# Patient Record
Sex: MALE | Race: BLACK OR AFRICAN AMERICAN | NOT HISPANIC OR LATINO | Employment: UNEMPLOYED | URBAN - METROPOLITAN AREA
[De-identification: names, ages, dates, MRNs, and addresses within clinical notes are randomized per-mention and may not be internally consistent; named-entity substitution may affect disease eponyms.]

---

## 2017-06-07 DIAGNOSIS — G89.29 CHRONIC RADICULAR LOW BACK PAIN: ICD-10-CM

## 2017-06-07 DIAGNOSIS — K21.9 GASTROESOPHAGEAL REFLUX DISEASE WITHOUT ESOPHAGITIS: Chronic | ICD-10-CM

## 2017-06-07 DIAGNOSIS — E78.5 HYPERLIPIDEMIA LDL GOAL <100: Chronic | ICD-10-CM

## 2017-06-07 DIAGNOSIS — M54.16 CHRONIC RADICULAR LOW BACK PAIN: ICD-10-CM

## 2017-06-07 DIAGNOSIS — K59.01 SLOW TRANSIT CONSTIPATION: ICD-10-CM

## 2017-06-07 DIAGNOSIS — I10 HYPERTENSION GOAL BP (BLOOD PRESSURE) < 140/90: Chronic | ICD-10-CM

## 2017-06-07 NOTE — TELEPHONE ENCOUNTER
lisinoprol 20 mg      Last Written Prescription Date: 6/28/16  Last Fill Quantity: 30, # refills: 11  [place] Visit [place] [facility] or [facility] prescribing provider: 9/22/16       Lab Results   Component Value Date    CHOL 237 09/22/2016     Lab Results   Component Value Date    HDL 39 09/22/2016     Lab Results   Component Value Date     09/22/2016     Lab Results   Component Value Date    TRIG 264 09/22/2016     Lab Results   Component Value Date    CHOLHDLRATIO 6.5 10/06/2014     mapap 650 mg       Last Written Prescription Date:  6/28/16  Last Fill Quantity:90  ,  # refills:   11  Last Office Visit with [facility] or [facility] prescribing provider: 9/22/16                                             Lisinopril 20 mg       Last Written Prescription Date:    Last Fill Quantity:  , # refills:    Last Office Visit with [facility] or [facility] prescribing provider: 9/22/16       Potassium   Date Value Ref Range Status   09/22/2016 4.8 3.4 - 5.3 mmol/L Final     Creatinine   Date Value Ref Range Status   09/22/2016 0.85 0.66 - 1.25 mg/dL Final     BP Readings from Last 3 Encounters:   09/22/16 126/66   06/28/16 132/70   02/15/16 126/64

## 2017-06-07 NOTE — TELEPHONE ENCOUNTER
All of these medications are not on the medication list at all    Metoprolol er 25 mg       Last Written Prescription Date:    Last Fill Quantity:  , # refills:       Last Office Visit with [facility], [facility] or  [facility] prescribing provider:  9/22/16   Future Office Visit:        BP Readings from Last 3 Encounters:   09/22/16 126/66   06/28/16 132/70   02/15/16 126/64     Pantoprazole dr 40 mg       Last Written Prescription Date:    Last Fill Quantity:  ,  # refills:     Last Office Visit with [facility], [facility] or  [facility] prescribing provider: 9/22/16                                             Docusate senna       Last Written Prescription Date:    Last Fill Quantity:  ,  # refills:     Last Office Visit with [facility], [facility] or [facility] prescribing provider: 9/22/16                                             Clopidogrel 75 mg            Last Written Prescription Date:    Last Fill Quantity:  , # refills:      Last Office Visit with [facility], [facility] or [facility] prescribing provider:  9/22/16   Future Office Visit:       Lab Results   Component Value Date    WBC 6.9 02/15/2016     Lab Results   Component Value Date    RBC 5.79 02/15/2016     Lab Results   Component Value Date    HGB 17.2 02/15/2016     Lab Results   Component Value Date    HCT 48.8 02/15/2016     No components found for: MCT  Lab Results   Component Value Date    MCV 84 02/15/2016     Lab Results   Component Value Date    MCH 29.7 02/15/2016     Lab Results   Component Value Date    MCHC 35.2 02/15/2016     Lab Results   Component Value Date    RDW 11.9 02/15/2016     Lab Results   Component Value Date     02/15/2016     Lab Results   Component Value Date    AST 36 06/29/2014     Lab Results   Component Value Date    ALT 44 09/22/2016     Creatinine   Date Value Ref Range Status   09/22/2016 0.85 0.66 - 1.25 mg/dL Final   ]

## 2017-06-09 RX ORDER — LISINOPRIL 20 MG/1
20 TABLET ORAL DAILY
Qty: 90 TABLET | Refills: 0 | Status: SHIPPED | OUTPATIENT
Start: 2017-06-09 | End: 2017-10-24

## 2017-06-09 RX ORDER — AMOXICILLIN 250 MG
1 CAPSULE ORAL 2 TIMES DAILY
Qty: 60 TABLET | Refills: 11 | Status: SHIPPED | OUTPATIENT
Start: 2017-06-09 | End: 2017-11-17

## 2017-06-09 RX ORDER — ATORVASTATIN CALCIUM 20 MG/1
20 TABLET, FILM COATED ORAL DAILY
Qty: 90 TABLET | Refills: 0 | Status: SHIPPED | OUTPATIENT
Start: 2017-06-09 | End: 2017-10-24

## 2017-06-09 RX ORDER — CLOPIDOGREL BISULFATE 75 MG/1
75 TABLET ORAL DAILY
Qty: 39 TABLET | Refills: 0 | Status: SHIPPED | OUTPATIENT
Start: 2017-06-09 | End: 2017-10-24

## 2017-06-09 RX ORDER — METOPROLOL SUCCINATE 25 MG/1
25 TABLET, EXTENDED RELEASE ORAL DAILY
Qty: 30 TABLET | Refills: 0 | Status: SHIPPED | OUTPATIENT
Start: 2017-06-09 | End: 2017-10-24

## 2017-06-09 RX ORDER — PANTOPRAZOLE SODIUM 40 MG/1
40 TABLET, DELAYED RELEASE ORAL DAILY
Qty: 30 TABLET | Refills: 0 | Status: SHIPPED | OUTPATIENT
Start: 2017-06-09 | End: 2017-09-02

## 2017-06-09 RX ORDER — SENNOSIDES 8.6 MG
650 CAPSULE ORAL 3 TIMES DAILY
Qty: 270 TABLET | Refills: 0 | Status: SHIPPED | OUTPATIENT
Start: 2017-06-09 | End: 2017-11-17

## 2017-06-09 NOTE — TELEPHONE ENCOUNTER
Routing refill request to provider for review/approval because:  Drug not active on patient's medication list

## 2017-06-09 NOTE — TELEPHONE ENCOUNTER
Lisinopril, APAP, atorvastatin   Prescription approved per [facility] Refill Protocol for 12 months of refills since last appointment, which was 9/22/16    atorvastatin (LIPITOR) 20 MG tablet    Last Written Prescription Date: 6/28/16  Last Fill Quantity: 30, # refills: 11  Last Office Visit with [facility], [facility] or [facility] prescribing provider: 9/22/16       Lab Results   Component Value Date    CHOL 237 09/22/2016     Lab Results   Component Value Date    HDL 39 09/22/2016     Lab Results   Component Value Date     09/22/2016     Lab Results   Component Value Date    TRIG 264 09/22/2016     Lab Results   Component Value Date    CHOLHDLRATIO 6.5 10/06/2014

## 2017-09-02 DIAGNOSIS — K21.9 GASTROESOPHAGEAL REFLUX DISEASE WITHOUT ESOPHAGITIS: Chronic | ICD-10-CM

## 2017-09-02 NOTE — TELEPHONE ENCOUNTER
PANTOPRAZOLE SOD DR 40 MG TAB      Last Written Prescription Date: 07/07/17  Last Fill Quantity: 30,  # refills: 1   Last Office Visit with [facility], [facility] or [facility] prescribing provider: 08/04/17

## 2017-09-06 RX ORDER — PANTOPRAZOLE SODIUM 40 MG/1
TABLET, DELAYED RELEASE ORAL
Qty: 30 TABLET | Refills: 10 | Status: SHIPPED | OUTPATIENT
Start: 2017-09-06 | End: 2017-10-24

## 2017-09-26 RX ORDER — SITAGLIPTIN 100 MG/1
TABLET, FILM COATED ORAL
Qty: 90 TABLET | Refills: 0 | Status: SHIPPED | OUTPATIENT
Start: 2017-09-26 | End: 2017-11-17

## 2017-09-26 NOTE — TELEPHONE ENCOUNTER
Januvia 100 mg         Last Written Prescription Date: 9/22/16  Last Fill Quantity: 90, # refills: 1  Last Office Visit with [facility] or [facility] prescribing provider:  9/22/16        BP Readings from Last 3 Encounters:   09/22/16 126/66   06/28/16 132/70   02/15/16 126/64     Lab Results   Component Value Date    MICROL 6 09/22/2016     Lab Results   Component Value Date    UMALCR 5.47 09/22/2016     Creatinine   Date Value Ref Range Status   09/22/2016 0.85 0.66 - 1.25 mg/dL Final   ]  GFR Estimate   Date Value Ref Range Status   09/22/2016 >90 >60 mL/min/1.7m2 Final   02/15/2016 >90 >60 mL/min/1.7m2 Final   06/29/2014 >90 >60 mL/min/1.7m2 Final     GFR Estimate If Black   Date Value Ref Range Status   09/22/2016 >90 >60 mL/min/1.7m2 Final   02/15/2016 >90 >60 mL/min/1.7m2 Final   06/29/2014 >90 >60 mL/min/1.7m2 Final     Lab Results   Component Value Date    CHOL 237 09/22/2016     Lab Results   Component Value Date    HDL 39 09/22/2016     Lab Results   Component Value Date     09/22/2016     Lab Results   Component Value Date    TRIG 264 09/22/2016     Lab Results   Component Value Date    CHOLHDLRATIO 6.5 10/06/2014     Lab Results   Component Value Date    AST 36 06/29/2014     Lab Results   Component Value Date    ALT 44 09/22/2016     Lab Results   Component Value Date    A1C 9.0 09/22/2016    A1C 8.9 10/06/2014    A1C 10.1 09/30/2013    A1C 9.6 07/31/2013    A1C 11.3 04/08/2013     Potassium   Date Value Ref Range Status   09/22/2016 4.8 3.4 - 5.3 mmol/L Final

## 2017-09-26 NOTE — TELEPHONE ENCOUNTER
Medication is being filled for 1 time refill only due to:  Patient needs to be seen because it has been more than one year since last visit. Needs labs also.

## 2017-10-09 ENCOUNTER — TELEPHONE (OUTPATIENT)
Dept: FAMILY MEDICINE | Facility: CLINIC | Age: 47
End: 2017-10-09

## 2017-10-09 NOTE — TELEPHONE ENCOUNTER
Panel Management Review      Patient has the following on his problem list:     Depression / Dysthymia review    Measure:  Needs PHQ-9 score of 4 or less during index window.  Administer PHQ-9 and if score is 5 or more, send encounter to provider for next steps.    5 - 7 month window range:     PHQ-9 SCORE 9/16/2013 10/6/2014 6/28/2016   Total Score 13 0 -   Total Score - - 0       If PHQ-9 recheck is 5 or more, route to provider for next steps.    Patient is due for:  PHQ9 and DAP    Diabetes    ASA: Passed    Last A1C  Lab Results   Component Value Date    A1C 9.0 09/22/2016    A1C 8.9 10/06/2014    A1C 10.1 09/30/2013    A1C 9.6 07/31/2013    A1C 11.3 04/08/2013     A1C tested: FAILED    Last LDL:    Lab Results   Component Value Date    CHOL 237 09/22/2016     Lab Results   Component Value Date    HDL 39 09/22/2016     Lab Results   Component Value Date     09/22/2016     Lab Results   Component Value Date    TRIG 264 09/22/2016     Lab Results   Component Value Date    CHOLHDLRATIO 6.5 10/06/2014     Lab Results   Component Value Date    NHDL 198 09/22/2016       Is the patient on a Statin? YES             Is the patient on Aspirin? NO    Medications     HMG CoA Reductase Inhibitors    atorvastatin (LIPITOR) 20 MG tablet          Last three blood pressure readings:  BP Readings from Last 3 Encounters:   09/22/16 126/66   06/28/16 132/70   02/15/16 126/64       Date of last diabetes office visit: 9/22/2016     Tobacco History:     History   Smoking Status     Current Every Day Smoker     Packs/day: 0.25     Types: Cigarettes     Last attempt to quit: 7/16/2013   Smokeless Tobacco     Never Used          COPD  Diagnosis date: unknown   Is this diagnosis new within the last year?   unknown   Was spirometry completed?  NO            Composite cancer screening  Chart review shows that this patient is due/due soon for the following None  Summary:    Patient is due/failing the following:   A1C, DAP and  PHQ9    Action needed:   Patient needs office visit for diabetes, COPD, depression.    Type of outreach:    Sent letter.    Questions for provider review:    None                                                                                                                                    Drea Moses CMA

## 2017-10-09 NOTE — LETTER
October 9, 2017    Vikram Rodgers  [address]  [place] [address]    Dear Allison Sue cares about your health and your health plan.  I have reviewed your medical conditions, medication list and lab results, and am making recommendations based on this review to better manage your health.    You are in particular need of attention regarding:  -Depression/Anxiety  -Diabetes    I am recommending that you:     Fill out PHQ-9 and send back in the envelope provided.      Please call us at the [place] location:  [phone] or use [employer] to address the above recommendations.     Thank you for trusting [facility].  We appreciate the opportunity to serve you and look forward to supporting your healthcare in the future.    If you have (or plan to have) any of these tests done at a facility other than a [facility] or a [facility], please have the results sent to the [facility] location noted above.      Best Regards,    Hamlet Tavares Jr MD

## 2017-10-24 ENCOUNTER — OFFICE VISIT (OUTPATIENT)
Dept: FAMILY MEDICINE | Facility: CLINIC | Age: 47
End: 2017-10-24
Payer: COMMERCIAL

## 2017-10-24 VITALS
SYSTOLIC BLOOD PRESSURE: 104 MMHG | HEART RATE: 90 BPM | BODY MASS INDEX: 21.33 KG/M2 | TEMPERATURE: 97.6 F | HEIGHT: 70 IN | RESPIRATION RATE: 20 BRPM | OXYGEN SATURATION: 99 % | WEIGHT: 149 LBS | DIASTOLIC BLOOD PRESSURE: 64 MMHG

## 2017-10-24 DIAGNOSIS — M25.559 PAIN IN JOINT, PELVIC REGION AND THIGH, UNSPECIFIED LATERALITY: ICD-10-CM

## 2017-10-24 DIAGNOSIS — L29.9 PRURITIC DISORDER: ICD-10-CM

## 2017-10-24 DIAGNOSIS — F20.1 DISORGANIZED SCHIZOPHRENIA (H): ICD-10-CM

## 2017-10-24 DIAGNOSIS — E11.42 TYPE 2 DIABETES MELLITUS WITH DIABETIC POLYNEUROPATHY, WITHOUT LONG-TERM CURRENT USE OF INSULIN (H): ICD-10-CM

## 2017-10-24 DIAGNOSIS — E78.5 HYPERLIPIDEMIA LDL GOAL <100: Chronic | ICD-10-CM

## 2017-10-24 DIAGNOSIS — R05.9 COUGH: ICD-10-CM

## 2017-10-24 DIAGNOSIS — I10 HYPERTENSION GOAL BP (BLOOD PRESSURE) < 140/90: Chronic | ICD-10-CM

## 2017-10-24 DIAGNOSIS — F32.4 MAJOR DEPRESSIVE DISORDER, SINGLE EPISODE, IN PARTIAL REMISSION (H): Chronic | ICD-10-CM

## 2017-10-24 DIAGNOSIS — N40.1 BENIGN NON-NODULAR PROSTATIC HYPERPLASIA WITH LOWER URINARY TRACT SYMPTOMS: ICD-10-CM

## 2017-10-24 DIAGNOSIS — F17.200 TOBACCO USE DISORDER: ICD-10-CM

## 2017-10-24 DIAGNOSIS — I69.30 H/O: STROKE WITH RESIDUAL EFFECTS: Primary | Chronic | ICD-10-CM

## 2017-10-24 DIAGNOSIS — K21.9 GASTROESOPHAGEAL REFLUX DISEASE WITHOUT ESOPHAGITIS: Chronic | ICD-10-CM

## 2017-10-24 DIAGNOSIS — I10 ESSENTIAL HYPERTENSION, BENIGN: ICD-10-CM

## 2017-10-24 DIAGNOSIS — J42 CHRONIC BRONCHITIS, UNSPECIFIED CHRONIC BRONCHITIS TYPE (H): Chronic | ICD-10-CM

## 2017-10-24 LAB — HBA1C MFR BLD: 11.4 % (ref 4.3–6)

## 2017-10-24 PROCEDURE — 36415 COLL VENOUS BLD VENIPUNCTURE: CPT | Performed by: FAMILY MEDICINE

## 2017-10-24 PROCEDURE — 99214 OFFICE O/P EST MOD 30 MIN: CPT | Performed by: FAMILY MEDICINE

## 2017-10-24 PROCEDURE — 80061 LIPID PANEL: CPT | Performed by: FAMILY MEDICINE

## 2017-10-24 PROCEDURE — 84460 ALANINE AMINO (ALT) (SGPT): CPT | Performed by: FAMILY MEDICINE

## 2017-10-24 PROCEDURE — 82043 UR ALBUMIN QUANTITATIVE: CPT | Performed by: FAMILY MEDICINE

## 2017-10-24 PROCEDURE — 83036 HEMOGLOBIN GLYCOSYLATED A1C: CPT | Performed by: FAMILY MEDICINE

## 2017-10-24 PROCEDURE — 80048 BASIC METABOLIC PNL TOTAL CA: CPT | Performed by: FAMILY MEDICINE

## 2017-10-24 RX ORDER — PANTOPRAZOLE SODIUM 40 MG/1
TABLET, DELAYED RELEASE ORAL
Qty: 30 TABLET | Refills: 10 | Status: SHIPPED | OUTPATIENT
Start: 2017-10-24 | End: 2017-11-17

## 2017-10-24 RX ORDER — PANTOPRAZOLE SODIUM 40 MG/1
TABLET, DELAYED RELEASE ORAL
Qty: 30 TABLET | Refills: 10 | Status: SHIPPED | OUTPATIENT
Start: 2017-10-24 | End: 2018-02-12

## 2017-10-24 RX ORDER — METOPROLOL SUCCINATE 25 MG/1
25 TABLET, EXTENDED RELEASE ORAL DAILY
Qty: 30 TABLET | Refills: 0 | Status: SHIPPED | OUTPATIENT
Start: 2017-10-24 | End: 2018-01-23

## 2017-10-24 RX ORDER — CLOPIDOGREL BISULFATE 75 MG/1
75 TABLET ORAL DAILY
Qty: 39 TABLET | Refills: 0 | Status: SHIPPED | OUTPATIENT
Start: 2017-10-24 | End: 2018-01-23

## 2017-10-24 RX ORDER — ATORVASTATIN CALCIUM 20 MG/1
20 TABLET, FILM COATED ORAL DAILY
Qty: 90 TABLET | Refills: 0 | Status: SHIPPED | OUTPATIENT
Start: 2017-10-24 | End: 2018-01-23

## 2017-10-24 RX ORDER — LISINOPRIL 20 MG/1
20 TABLET ORAL DAILY
Qty: 90 TABLET | Refills: 0 | Status: SHIPPED | OUTPATIENT
Start: 2017-10-24 | End: 2017-11-17

## 2017-10-24 RX ORDER — CETIRIZINE HYDROCHLORIDE 10 MG/1
10 TABLET ORAL DAILY
Qty: 30 TABLET | Refills: 11 | Status: SHIPPED | OUTPATIENT
Start: 2017-10-24 | End: 2017-11-17

## 2017-10-24 RX ORDER — ATORVASTATIN CALCIUM 20 MG/1
20 TABLET, FILM COATED ORAL DAILY
Qty: 90 TABLET | Refills: 0 | Status: SHIPPED | OUTPATIENT
Start: 2017-10-24 | End: 2018-04-03

## 2017-10-24 RX ORDER — BLOOD-GLUCOSE METER
EACH MISCELLANEOUS
Qty: 1 KIT | Refills: 0 | Status: SHIPPED | OUTPATIENT
Start: 2017-10-24 | End: 2017-10-25

## 2017-10-24 RX ORDER — LISINOPRIL 20 MG/1
20 TABLET ORAL DAILY
Qty: 90 TABLET | Refills: 0 | Status: SHIPPED | OUTPATIENT
Start: 2017-10-24 | End: 2018-02-12

## 2017-10-24 RX ORDER — GLIMEPIRIDE 4 MG/1
4 TABLET ORAL
Qty: 60 TABLET | Refills: 11 | Status: SHIPPED | OUTPATIENT
Start: 2017-10-24 | End: 2017-11-17

## 2017-10-24 RX ORDER — TAMSULOSIN HYDROCHLORIDE 0.4 MG/1
0.4 CAPSULE ORAL DAILY
Qty: 30 CAPSULE | Refills: 11 | Status: SHIPPED | OUTPATIENT
Start: 2017-10-24 | End: 2017-11-17

## 2017-10-24 RX ORDER — METOPROLOL SUCCINATE 25 MG/1
25 TABLET, EXTENDED RELEASE ORAL DAILY
Qty: 30 TABLET | Refills: 0 | Status: SHIPPED | OUTPATIENT
Start: 2017-10-24 | End: 2017-11-17

## 2017-10-24 ASSESSMENT — PATIENT HEALTH QUESTIONNAIRE - PHQ9
SUM OF ALL RESPONSES TO PHQ QUESTIONS 1-9: 13
10. IF YOU CHECKED OFF ANY PROBLEMS, HOW DIFFICULT HAVE THESE PROBLEMS MADE IT FOR YOU TO DO YOUR WORK, TAKE CARE OF THINGS AT HOME, OR GET ALONG WITH OTHER PEOPLE: NOT DIFFICULT AT ALL
SUM OF ALL RESPONSES TO PHQ QUESTIONS 1-9: 13

## 2017-10-24 NOTE — MR AVS SNAPSHOT
After Visit Summary   10/24/2017    Vikram Rodgers    MRN: [id number]           Patient Information     Date Of Birth          1970        Visit Information        Provider Department      10/24/2017 3:45 PM Hamlet Tavares MD; [employer] [facility] [place] [facility]        Today's Diagnoses     H/O: stroke with residual effects    -  1    Hyperlipidemia LDL goal <100        Type 2 diabetes mellitus, uncontrolled, with neuropathy (H)        Hypertension goal BP (blood pressure) < 140/90        Chronic bronchitis, unspecified chronic bronchitis type (H)        Disorganized schizophrenia (H)        Essential hypertension, benign        Tobacco use disorder        Gastroesophageal reflux disease without esophagitis        Type 2 diabetes mellitus with diabetic polyneuropathy, without long-term current use of insulin (H)        Major depressive disorder, single episode, in partial remission (H)        Pruritic disorder        Benign non-nodular prostatic hyperplasia with lower urinary tract symptoms        Cough        Pain in joint, pelvic region and thigh, unspecified laterality          Care Instructions    (I69.30) H/O: stroke with residual effects  (primary encounter diagnosis)  Comment: LEFT  SIDE FEB 08/2017  Plan: COPD ACTION PLAN             (E78.5) Hyperlipidemia LDL goal <100  Comment:    Plan: lisinopril (PRINIVIL/ZESTRIL) 20 MG tablet,         atorvastatin (LIPITOR) 20 MG tablet, lisinopril        (PRINIVIL/ZESTRIL) 20 MG tablet, atorvastatin         (LIPITOR) 20 MG tablet, Lipid panel reflex to         direct LDL Fasting, Hemoglobin A1c, ALT             (E11.40,  E11.65) Type 2 diabetes mellitus, uncontrolled, with neuropathy (H)  Comment:    Plan: Albumin Random Urine Quantitative with Creat         Ratio             (I10) Hypertension goal BP (blood pressure) < 140/90  Comment:    Plan: metoprolol (TOPROL-XL) 25 MG 24 hr tablet,          metoprolol (TOPROL-XL) 25 MG 24 hr tablet,         clopidogrel (PLAVIX) 75 MG tablet, Basic         metabolic panel             (J42) Chronic bronchitis, unspecified chronic bronchitis type (H)  Comment:    Plan:      (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)  Comment:    Plan:      (I10) Essential hypertension, benign  Comment:    Plan:      (F17.200) Tobacco use disorder  Comment:    Plan:      (K21.9) Gastroesophageal reflux disease without esophagitis  Comment:    Plan: pantoprazole (PROTONIX) 40 MG EC tablet,         pantoprazole (PROTONIX) 40 MG EC tablet             (E11.42) Type 2 diabetes mellitus with diabetic polyneuropathy, without long-term current use of insulin (H)  Comment:    Plan: blood glucose monitoring (ONE TOUCH ULTRA 2)         meter device kit, blood glucose calibration         (ONETOUCH ULTRA CONTROL) solution, glimepiride         (AMARYL) 4 MG tablet             (F32.4) Major depressive disorder, single episode, in partial remission (H)  Comment:    Plan: FLUoxetine (PROZAC) 20 MG capsule             (L29.9) Pruritic disorder  Comment:    Plan: cetirizine (ZYRTEC) 10 MG tablet             (N40.1) Benign non-nodular prostatic hyperplasia with lower urinary tract symptoms  Comment:    Plan: tamsulosin (FLOMAX) 0.4 MG capsule             (R05) Cough  Comment:    Plan: blood glucose monitoring (NO BRAND SPECIFIED)         test strip             (M25.559) Pain in joint, pelvic region and thigh, unspecified laterality  Comment:    Plan: diclofenac (VOLTAREN) 1 % GEL topical gel                              Follow-ups after your visit        Who to contact     If you have questions or need follow up information about today's clinic visit or your schedule please contact [facility] directly at [phone].  Normal or non-critical lab and imaging results will be communicated to you by MyChart, letter or phone within 4 business days after the clinic has received the  "results. If you do not hear from us within 7 days, please contact the clinic through [employer] or phone. If you have a critical or abnormal lab result, we will notify you by phone as soon as possible.  Submit refill requests through [employer] or call your pharmacy and they will forward the refill request to us. Please allow 3 business days for your refill to be completed.          Additional Information About Your Visit        [employer] Information     [employer] lets you send messages to your doctor, view your test results, renew your prescriptions, schedule appointments and more. To sign up, go to [email or web address] . Click on \"Log in\" on the left side of the screen, which will take you to the Welcome page. Then click on \"Sign up Now\" on the right side of the page.     You will be asked to enter the access code listed below, as well as some personal information. Please follow the directions to create your username and password.     Your access code is: [email or web address]  Expires: 2018  4:32 PM     Your access code will  in 90 days. If you need help or a new code, please call your [place] clinic or [phone].        Care EveryWhere ID     This is your Care EveryWhere ID. This could be used by other organizations to access your [place] medical records  [id number]        Your Vitals Were     Pulse Temperature Respirations Height Pulse Oximetry BMI (Body Mass Index)    90 97.6  F (36.4  C) (Tympanic) 20 5' 9.5\" (1.765 m) 99% 21.69 kg/m2       Blood Pressure from Last 3 Encounters:   10/24/17 104/64   16 126/66   16 132/70    Weight from Last 3 Encounters:   10/24/17 149 lb (67.6 kg)   16 149 lb (67.6 kg)   16 147 lb 9.6 oz (67 kg)              We Performed the Following     Albumin Random Urine Quantitative with Creat Ratio     ALT     Basic metabolic panel     COPD ACTION PLAN     DEPRESSION ACTION PLAN (DAP)     Hemoglobin A1c     Lipid panel reflex to direct LDL Fasting        "   Today's Medication Changes          These changes are accurate as of: 10/24/17  4:32 PM.  If you have any questions, ask your nurse or doctor.               Start taking these medicines.        Dose/Directions    * atorvastatin 20 MG tablet   Commonly known as:  LIPITOR   Used for:  Hyperlipidemia LDL goal <100   Started by:  Hamlet Tavares MD        Dose:  20 mg   Take 1 tablet (20 mg) by mouth daily   Quantity:  90 tablet   Refills:  0       * atorvastatin 20 MG tablet   Commonly known as:  LIPITOR   Used for:  Hyperlipidemia LDL goal <100   Started by:  Hamlet Tavares MD        Dose:  20 mg   Take 1 tablet (20 mg) by mouth daily   Quantity:  90 tablet   Refills:  0       * lisinopril 20 MG tablet   Commonly known as:  PRINIVIL/ZESTRIL   Used for:  Hyperlipidemia LDL goal <100   Started by:  Hamlet Tavares MD        Dose:  20 mg   Take 1 tablet (20 mg) by mouth daily   Quantity:  90 tablet   Refills:  0       * lisinopril 20 MG tablet   Commonly known as:  PRINIVIL/ZESTRIL   Used for:  Hyperlipidemia LDL goal <100   Started by:  Hamlet Tavares MD        Dose:  20 mg   Take 1 tablet (20 mg) by mouth daily   Quantity:  90 tablet   Refills:  0       * metoprolol 25 MG 24 hr tablet   Commonly known as:  TOPROL-XL   Used for:  Hypertension goal BP (blood pressure) < 140/90   Started by:  Hamlet Tavares MD        Dose:  25 mg   Take 1 tablet (25 mg) by mouth daily   Quantity:  30 tablet   Refills:  0       * metoprolol 25 MG 24 hr tablet   Commonly known as:  TOPROL-XL   Used for:  Hypertension goal BP (blood pressure) < 140/90   Started by:  Hamlet Tavares MD        Dose:  25 mg   Take 1 tablet (25 mg) by mouth daily   Quantity:  30 tablet   Refills:  0       * pantoprazole 40 MG EC tablet   Commonly known as:  PROTONIX   Used for:  Gastroesophageal reflux disease without esophagitis   Started by:  Hamlet Tavares MD        TAKE 1 TABLET (40 MG) BY  MOUTH DAILY, 30-60 MINUTES BEFORE A MEAL.   Quantity:  30 tablet   Refills:  10       * pantoprazole 40 MG EC tablet   Commonly known as:  PROTONIX   Used for:  Gastroesophageal reflux disease without esophagitis   Started by:  Hamlet Tavares MD        TAKE 1 TABLET (40 MG) BY MOUTH DAILY, 30-60 MINUTES BEFORE A MEAL.   Quantity:  30 tablet   Refills:  10       * Notice:  This list has 8 medication(s) that are the same as other medications prescribed for you. Read the directions carefully, and ask your doctor or other care provider to review them with you.      These medicines have changed or have updated prescriptions.        Dose/Directions    tamsulosin 0.4 MG capsule   Commonly known as:  FLOMAX   This may have changed:  when to take this   Used for:  Benign non-nodular prostatic hyperplasia with lower urinary tract symptoms   Changed by:  Hamlet Tavares MD        Dose:  0.4 mg   Take 1 capsule (0.4 mg) by mouth daily   Quantity:  30 capsule   Refills:  11            Where to get your medicines      These medications were sent to [place] [address] [place] [facility] [address] [facility]  [address] [facility] [address]     Phone:  [phone]     atorvastatin 20 MG tablet    atorvastatin 20 MG tablet    blood glucose calibration solution    blood glucose monitoring meter device kit    blood glucose monitoring test strip    cetirizine 10 MG tablet    clopidogrel 75 MG tablet    diclofenac 1 % Gel topical gel    FLUoxetine 20 MG capsule    glimepiride 4 MG tablet    lisinopril 20 MG tablet    lisinopril 20 MG tablet    metoprolol 25 MG 24 hr tablet    metoprolol 25 MG 24 hr tablet    pantoprazole 40 MG EC tablet    pantoprazole 40 MG EC tablet    tamsulosin 0.4 MG capsule                Primary Care Provider Office Phone # Fax #    Hamlet Tavares [phone] [address] [facility] [address]        Equal Access to Services     MARISELA PICHARDO AH:  Hadii aad ku hadhirao Soomaali, waaxda luqadaha, qaybta kaalmada vladislav, javier yanniin hayaahema bearyahir feng larebekahhema christy. So [facility] [phone].    ATENCIÓN: Si jodila cecilio, tiene a mathur disposición servicios gratuitos de asistencia lingüística. Llame al [phone].    We comply with applicable federal civil rights laws and [place] laws. We do not discriminate on the basis of race, color, national origin, age, disability, sex, sexual orientation, or gender identity.            Thank you!     Thank you for choosing [facility]  for your care. Our goal is always to provide you with excellent care. Hearing back from our patients is one way we can continue to improve our services. Please take a few minutes to complete the written survey that you may receive in the mail after your visit with us. Thank you!             Your Updated Medication List - Protect others around you: Learn how to safely use, store and throw away your medicines at www.disposemymeds.org.          This list is accurate as of: 10/24/17  4:32 PM.  Always use your most recent med list.                   Brand Name Dispense Instructions for use Diagnosis    acetaminophen 650 MG CR tablet    SM ARTHRITIS PAIN RELIEF    270 tablet    Take 1 tablet (650 mg) by mouth 3 times daily    Chronic radicular low back pain       * atorvastatin 20 MG tablet    LIPITOR    90 tablet    Take 1 tablet (20 mg) by mouth daily    Hyperlipidemia LDL goal <100       * atorvastatin 20 MG tablet    LIPITOR    90 tablet    Take 1 tablet (20 mg) by mouth daily    Hyperlipidemia LDL goal <100       blood glucose calibration solution     1 Bottle    Use to calibrate blood glucose monitor as directed.    Type 2 diabetes mellitus with diabetic polyneuropathy, without long-term current use of insulin (H)       blood glucose monitoring meter device kit     1 kit    Use to test blood sugars DAILY  times daily or as directed.    Type 2 diabetes mellitus  with diabetic polyneuropathy, without long-term current use of insulin (H)       blood glucose monitoring test strip    no brand specified    100 each    1 strip by In Vitro route daily    Cough       cetirizine 10 MG tablet    zyrTEC    30 tablet    Take 1 tablet (10 mg) by mouth daily    Pruritic disorder       clopidogrel 75 MG tablet    PLAVIX    39 tablet    Take 1 tablet (75 mg) by mouth daily    Hypertension goal BP (blood pressure) < 140/90       diclofenac 1 % Gel topical gel    VOLTAREN    100 g    APPLY 4 GRAMS TO KNEES OR 2 GRAMS TO HANDS BY TOPICAL ROUTE FOUR TIMES DAILY USING ENCLOSED DOSING CARD    Pain in joint, pelvic region and thigh, unspecified laterality       FLUoxetine 20 MG capsule    PROzac    30 capsule    Take 1 capsule (20 mg) by mouth daily    Major depressive disorder, single episode, in partial remission (H)       glimepiride 4 MG tablet    AMARYL    60 tablet    Take 1 tablet (4 mg) by mouth every morning (before breakfast)    Type 2 diabetes mellitus with diabetic polyneuropathy, without long-term current use of insulin (H)       Ipratropium-Albuterol  MCG/ACT inhaler    COMBIVENT RESPIMAT    1 Inhaler    Inhale 1 puff into the lungs 4 times daily Not to exceed 6 doses per day.    Wheeze       JANUVIA 100 MG tablet   Generic drug:  sitagliptin     90 tablet    TAKE 1 TABLET (100 MG) BY MOUTH DAILY    Type 2 diabetes mellitus, uncontrolled, with neuropathy (H)       * lisinopril 20 MG tablet    PRINIVIL/ZESTRIL    90 tablet    Take 1 tablet (20 mg) by mouth daily    Hyperlipidemia LDL goal <100       * lisinopril 20 MG tablet    PRINIVIL/ZESTRIL    90 tablet    Take 1 tablet (20 mg) by mouth daily    Hyperlipidemia LDL goal <100       losartan-hydrochlorothiazide 100-12.5 MG per tablet    HYZAAR    30 tablet    Take 1 tablet by mouth daily    Essential hypertension with goal blood pressure less than 140/90       * metoprolol 25 MG 24 hr tablet    TOPROL-XL    30 tablet    Take 1  tablet (25 mg) by mouth daily    Hypertension goal BP (blood pressure) < 140/90       * metoprolol 25 MG 24 hr tablet    TOPROL-XL    30 tablet    Take 1 tablet (25 mg) by mouth daily    Hypertension goal BP (blood pressure) < 140/90       omeprazole 40 MG capsule    priLOSEC    30 capsule    Take 1 capsule (40 mg) by mouth daily    Gastroesophageal reflux disease with esophagitis, Gastroesophageal reflux disease without esophagitis       * pantoprazole 40 MG EC tablet    PROTONIX    30 tablet    TAKE 1 TABLET (40 MG) BY MOUTH DAILY, 30-60 MINUTES BEFORE A MEAL.    Gastroesophageal reflux disease without esophagitis       * pantoprazole 40 MG EC tablet    PROTONIX    30 tablet    TAKE 1 TABLET (40 MG) BY MOUTH DAILY, 30-60 MINUTES BEFORE A MEAL.    Gastroesophageal reflux disease without esophagitis       senna-docusate 8.6-50 MG per tablet    SENOKOT-S;PERICOLACE    60 tablet    Take 1 tablet by mouth 2 times daily    Slow transit constipation       * Sodium Fluoride 1.1 % Crea    SF 5000 PLUS    51 g    use daily for brushing    Tooth pain       * Sodium Fluoride 1.1 % Crea    SF 5000 PLUS    51 g    use for daily brushing    Tooth pain       tamsulosin 0.4 MG capsule    FLOMAX    30 capsule    Take 1 capsule (0.4 mg) by mouth daily    Benign non-nodular prostatic hyperplasia with lower urinary tract symptoms       * Notice:  This list has 10 medication(s) that are the same as other medications prescribed for you. Read the directions carefully, and ask your doctor or other care provider to review them with you.

## 2017-10-24 NOTE — LETTER
"November 1, 2017      Vikram Abdi Vishal  [address]  [facility] [address]        Dear ,    We are writing to inform you of your test results.    NORMAL DIABETES URINE PROTEIN TEST   NORMAL LIVER FUNCTION TEST   VERY HIGH FASTING BLOOD SUGAR AT 371MG %   NORMAL LESS THAN 100   NORMAL RENAL FUNCTION AND BLOOD SALTS   POORLY CONTROLLED TOTAL CHOLESTEROL   HIGH  TRIGLYCERIDES   NORMAL HDL OR \"GOOD\" CHOLESTEROL   VERY HIGH LDL OR \"BAD\" CHOLESTEROL   DOUBT YOU ARE TAKING CHOLESTEROL MEDICATIONS   YOU COULD REDUCE RISK FOR STROKE OR HEART ATTACK 35% IF YOU WOULD DO SO   PLEASE COME BACK TO FOLLOW UP  ON THIS WITHIN THE NEXT 4-6 WEEKS     Resulted Orders   Basic metabolic panel   Result Value Ref Range    Sodium 136 133 - 144 mmol/L    Potassium 4.2 3.4 - 5.3 mmol/L    Chloride 102 94 - 109 mmol/L    Carbon Dioxide 22 20 - 32 mmol/L    Anion Gap 12 3 - 14 mmol/L    Glucose 371 (H) 70 - 99 mg/dL      Comment:      Non Fasting    Urea Nitrogen 15 7 - 30 mg/dL    Creatinine 0.60 (L) 0.66 - 1.25 mg/dL    GFR Estimate >90 >60 mL/min/1.7m2      Comment:      Non  GFR Calc    GFR Estimate If Black >90 >60 mL/min/1.7m2      Comment:       GFR Calc    Calcium 9.3 8.5 - 10.1 mg/dL   Lipid panel reflex to direct LDL Fasting   Result Value Ref Range    Cholesterol 247 (H) <200 mg/dL      Comment:      Desirable:       <200 mg/dl    Triglycerides 170 (H) <150 mg/dL      Comment:      Borderline high:  150-199 mg/dl  High:             200-499 mg/dl  Very high:       >499 mg/dl  Non Fasting      HDL Cholesterol 41 >39 mg/dL    LDL Cholesterol Calculated 172 (H) <100 mg/dL      Comment:      Above desirable:  100-129 mg/dl  Borderline High:  130-159 mg/dL  High:             160-189 mg/dL  Very high:       >189 mg/dl      Non HDL Cholesterol 206 (H) <130 mg/dL      Comment:      Above Desirable:  130-159 mg/dl  Borderline high:  160-189 mg/dl  High:             190-219 mg/dl  Very high:  "      >219 mg/dl     Albumin Random Urine Quantitative with Creat Ratio   Result Value Ref Range    Creatinine Urine 92 mg/dL    Albumin Urine mg/L 7 mg/L    Albumin Urine mg/g Cr 7.69 0 - 17 mg/g Cr   Hemoglobin A1c   Result Value Ref Range    Hemoglobin A1C 11.4 (H) 4.3 - 6.0 %   ALT   Result Value Ref Range    ALT 50 0 - 70 U/L       If you have any questions or concerns, please call the clinic at the number listed above.       Sincerely,        MONET ALLEN MD

## 2017-10-24 NOTE — PROGRESS NOTES
SUBJECTIVE:   Vikram Rodgers is a 47 year old male who presents to clinic today for the following health issues:      Diabetes Follow-up      Patient is checking blood sugars: OCCASIONAL    Diabetic concerns: None     Symptoms of hypoglycemia (low blood sugar): shaky, dizzy     Paresthesias (numbness or burning in feet) or sores: Yes      Date of last diabetic eye exam: 2 WEEKS AGO        Amount of exercise or physical activity: None    Problems taking medications regularly: No    Medication side effects: none    Diet: regular (no restrictions)        STROKE IN THE LAST 6 MONTHS      Duration: 2/8/2017    Description (location/character/radiation): WEAKNESS ON LEFT SIDE    Intensity:  moderate    Accompanying signs and symptoms: YES    History (similar episodes/previous evaluation): None    Precipitating or alleviating factors: None    Therapies tried and outcome: None       .  Current Outpatient Prescriptions   Medication Sig Dispense Refill     pantoprazole (PROTONIX) 40 MG EC tablet TAKE 1 TABLET (40 MG) BY MOUTH DAILY, 30-60 MINUTES BEFORE A MEAL. 30 tablet 10     metoprolol (TOPROL-XL) 25 MG 24 hr tablet Take 1 tablet (25 mg) by mouth daily 30 tablet 0     lisinopril (PRINIVIL/ZESTRIL) 20 MG tablet Take 1 tablet (20 mg) by mouth daily 90 tablet 0     atorvastatin (LIPITOR) 20 MG tablet Take 1 tablet (20 mg) by mouth daily 90 tablet 0     pantoprazole (PROTONIX) 40 MG EC tablet TAKE 1 TABLET (40 MG) BY MOUTH DAILY, 30-60 MINUTES BEFORE A MEAL. 30 tablet 10     metoprolol (TOPROL-XL) 25 MG 24 hr tablet Take 1 tablet (25 mg) by mouth daily 30 tablet 0     lisinopril (PRINIVIL/ZESTRIL) 20 MG tablet Take 1 tablet (20 mg) by mouth daily 90 tablet 0     atorvastatin (LIPITOR) 20 MG tablet Take 1 tablet (20 mg) by mouth daily 90 tablet 0     clopidogrel (PLAVIX) 75 MG tablet Take 1 tablet (75 mg) by mouth daily 39 tablet 0     blood glucose monitoring (ONE TOUCH ULTRA 2) meter device kit Use to test blood sugars  DAILY  times daily or as directed. 1 kit 0     blood glucose calibration (ONETOUCH ULTRA CONTROL) solution Use to calibrate blood glucose monitor as directed. 1 Bottle 11     glimepiride (AMARYL) 4 MG tablet Take 1 tablet (4 mg) by mouth every morning (before breakfast) 60 tablet 11     FLUoxetine (PROZAC) 20 MG capsule Take 1 capsule (20 mg) by mouth daily 30 capsule 11     cetirizine (ZYRTEC) 10 MG tablet Take 1 tablet (10 mg) by mouth daily 30 tablet 11     tamsulosin (FLOMAX) 0.4 MG capsule Take 1 capsule (0.4 mg) by mouth daily 30 capsule 11     blood glucose monitoring (NO BRAND SPECIFIED) test strip 1 strip by In Vitro route daily 100 each 11     diclofenac (VOLTAREN) 1 % GEL topical gel APPLY 4 GRAMS TO KNEES OR 2 GRAMS TO HANDS BY TOPICAL ROUTE FOUR TIMES DAILY USING ENCLOSED DOSING CARD 100 g 0     acetaminophen (SM ARTHRITIS PAIN RELIEF) 650 MG CR tablet Take 1 tablet (650 mg) by mouth 3 times daily 270 tablet 0     JANUVIA 100 MG tablet TAKE 1 TABLET (100 MG) BY MOUTH DAILY (Patient not taking: Reported on 10/24/2017) 90 tablet 0     senna-docusate (SENOKOT-S;PERICOLACE) 8.6-50 MG per tablet Take 1 tablet by mouth 2 times daily (Patient not taking: Reported on 10/24/2017) 60 tablet 11     [DISCONTINUED] atorvastatin (LIPITOR) 20 MG tablet Take 1 tablet (20 mg) by mouth daily (Patient not taking: Reported on 10/24/2017) 90 tablet 0     [DISCONTINUED] lisinopril (PRINIVIL/ZESTRIL) 20 MG tablet Take 1 tablet (20 mg) by mouth daily 90 tablet 0     [DISCONTINUED] clopidogrel (PLAVIX) 75 MG tablet Take 1 tablet (75 mg) by mouth daily 39 tablet 0     [DISCONTINUED] metoprolol (TOPROL-XL) 25 MG 24 hr tablet Take 1 tablet (25 mg) by mouth daily 30 tablet 0     [DISCONTINUED] glimepiride (AMARYL) 4 MG tablet Take 1 tablet (4 mg) by mouth every morning (before breakfast) 60 tablet 11     Sodium Fluoride (SF 5000 PLUS) 1.1 % CREA use daily for brushing (Patient not taking: Reported on 10/24/2017) 51 g 5      losartan-hydrochlorothiazide (HYZAAR) 100-12.5 MG per tablet Take 1 tablet by mouth daily 30 tablet 11     omeprazole (PRILOSEC) 40 MG capsule Take 1 capsule (40 mg) by mouth daily 30 capsule 11     Sodium Fluoride (SF 5000 PLUS) 1.1 % CREA use for daily brushing (Patient not taking: Reported on 10/24/2017) 51 g 11     Ipratropium-Albuterol (COMBIVENT RESPIMAT)  MCG/ACT inhaler Inhale 1 puff into the lungs 4 times daily Not to exceed 6 doses per day. 1 Inhaler 11     [DISCONTINUED] FLUoxetine (PROZAC) 20 MG capsule Take 1 capsule (20 mg) by mouth daily 30 capsule 11        No Known Allergies    Immunization History   Administered Date(s) Administered     Influenza (IIV3) 10/04/2010, 2013     Influenza Vaccine IM 3yrs+ 4 Valent IIV4 10/06/2014     Pneumococcal 23 valent 2013     TD (ADULT, 7+) 2010         reports that he does not drink alcohol.      reports that he does not use illicit drugs.    family history includes Asthma in his father; C.A.D. in his father; DIABETES in his father; Hypertension in his father; Neurologic Disorder in his brother; Unknown/Adopted in his mother. There is no history of Cancer - colorectal.    indicated that the status of his no family hx of is unknown. He indicated that the status of his mother is unknown. He indicated that his father is . He indicated that the status of his brother is unknown.      has a past surgical history that includes ENT surgery.     reports that he does not currently engage in sexual activity.  .  Pediatric History   Patient Guardian Status     Not on file.     Other Topics Concern     Parent/Sibling W/ Cabg, Mi Or Angioplasty Before 65f 55m? No     unknown     Social History Narrative         reports that he quit smoking about 8 months ago. His smoking use included Cigarettes. He smoked 0.25 packs per day. He has never used smokeless tobacco.    Medical, social, surgical, and family histories reviewed.    Labs reviewed in  EP[facility]  Patient Active Problem List   Diagnosis     Latent tuberculosis infection     PUD (peptic ulcer disease)     Moderate major depression (H)     CARDIOVASCULAR SCREENING; LDL GOAL LESS THAN 100     Unspecified hyperplasia of prostate with urinary obstruction and other lower urinary tract symptoms (LUTS)     Carpal tunnel syndrome     Disturbance of skin sensation     Vitamin D deficiency     Paranoid schizophrenia (H)     Nocturia     Schizophrenia (H)     Hallucinations     Positive PPD     Essential hypertension, benign     Hyperlipidemia     Tobacco use disorder     Diabetes mellitus, type 2 (H)     Gastroesophageal reflux disease without esophagitis     Health Care Home     Major depression in partial remission (H)     Hyperlipidemia LDL goal <100     Type 2 diabetes mellitus, uncontrolled, with neuropathy (H)     Hypertension goal BP (blood pressure) < 140/90     COPD (chronic obstructive pulmonary disease) (H)     Pain in joint, pelvic region and thigh     Tooth pain     Enlarged prostate with lower urinary tract symptoms (LUTS)     H/O: stroke with residual effects LEFT ARM AND LEG     Past Surgical History:   Procedure Laterality Date     ENT SURGERY      sinuses       Social History   Substance Use Topics     Smoking status: Former Smoker     Packs/day: 0.25     Types: Cigarettes     Quit date: 2/16/2017     Smokeless tobacco: Never Used     Alcohol use No     Family History   Problem Relation Age of Onset     Asthma Father      DIABETES Father      Hypertension Father      C.A.D. Father      Neurologic Disorder Brother      Unknown/Adopted Mother      Cancer - colorectal No family hx of          No Known Allergies  Recent Labs   Lab Test  10/24/17   1637  09/22/16   1637  02/15/16   1655  10/06/14   0833  06/29/14   1819   04/08/13   1232   A1C  11.4*  9.0*   --   8.9*   --    < >  11.3*   LDL   --   145*  168*  171*   --    < >  153*   HDL   --   39*  34*  36*   --    --   32*   TRIG   --   264*  178*   134   --    --   338*   ALT   --   44  39   --   45   < >  44   CR   --   0.85  0.70   --   0.62*   < >  0.80   GFRESTIMATED   --   >90  >90   --   >90   < >  >90   GFRESTBLACK   --   >90  >90   --   >90   < >  >90   POTASSIUM   --   4.8  4.3   --   4.4   < >  4.2   TSH   --    --   1.01  1.01   --    --    --   1.87    < > = values in this interval not displayed.        BP Readings from Last 6 Encounters:   10/24/17 104/64   09/22/16 126/66   06/28/16 132/70   02/15/16 126/64   01/08/15 154/90   12/23/14 142/80       Wt Readings from Last 3 Encounters:   10/24/17 149 lb (67.6 kg)   09/22/16 149 lb (67.6 kg)   06/28/16 147 lb 9.6 oz (67 kg)         Positive symptoms or findings indicated by bold designation:     ROS: 10 point ROS neg other than the symptoms noted above in the HPI.except  has Latent tuberculosis infection; PUD (peptic ulcer disease); Moderate major depression (H); CARDIOVASCULAR SCREENING; LDL GOAL LESS THAN 100; Unspecified hyperplasia of prostate with urinary obstruction and other lower urinary tract symptoms (LUTS); Carpal tunnel syndrome; Disturbance of skin sensation; Vitamin D deficiency; Paranoid schizophrenia (H); Nocturia; Schizophrenia (H); Hallucinations; Positive PPD; Essential hypertension, benign; Hyperlipidemia; Tobacco use disorder; Diabetes mellitus, type 2 (H); Gastroesophageal reflux disease without esophagitis; Health Care Home; Major depression in partial remission (H); Hyperlipidemia LDL goal <100; Type 2 diabetes mellitus, uncontrolled, with neuropathy (H); Hypertension goal BP (blood pressure) < 140/90; COPD (chronic obstructive pulmonary disease) (H); Pain in joint, pelvic region and thigh; Tooth pain; and Enlarged prostate with lower urinary tract symptoms (LUTS) on his problem list.   Constitutional: The patient denied fatigue, fever, insomnia, night sweats, recent illness and weight loss.  SLENDER     Eyes: The patient denied blindness, eye pain, eye tearing,  photophobia, vision change and visual disturbance. OK       Ears/Nose/Throat/Neck: The patient denied dizziness, facial pain, hearing loss, nasal discharge, oral pain, otalgia, postnasal drip, sinus congestion, sore throat, tinnitus and voice change.   NORMAL HEARING AND BALANCE     Cardiovascular: The patient denied arrhythmia, chest pain/pressure, claudication, edema, exercise intolerance, fatigue, orthopnea, palpitations and syncope.      Respiratory: The patient denied asthma, chest congestion, cough, dyspnea on exertion, dyspnea/shortness of breath, hemoptysis, pedal edema, pleuritic pain, productive sputum, snoring and wheezing. SMOKER     Gastrointestinal: The patient denied abdominal pain, anorexia, constipation, diarrhea, dysphagia, gastroesophageal reflux, hematochezia, hemorrhoids, melena, nausea and vomiting . NORMAL GASTROESOPHAGEAL REFLUX DISEASE IN PAST     Genitourinary/Nephrology: The patient denied breast complaint, dysuria, nocturia sexual dysfunction, t, urinary frequency, urinary incontinence, urinary urgency        Musculoskeletal: The patient denied arthralgia(s), back pain, joint complaint, muscle weakness, myalgias, osteoporosis, sciatica, stiffness and swelling.  LEFT ARM AND LEG WEAKNESS     Dermatoligic:: The patient denied acne, dermatitis, ecchymosis, itching, mole change, rash, skin cancer, skin lesion and sores.  NORMAL     Neurologic: The patient denied dizziness, gait abnormality, headache, memory loss, mental status change, paresis, paresthesia, seizure, syncope, tremor and vision change. NORMAL     Psychiatric: The patient denied anxiety, depression, disturbances of memory, drug abuse, insomnia, mood swings and relationship difficulties. SCHIZOPHRENIA     Endocrine: The patient denied , goiter, obesity, polyuria and thyroid disease.  NORMAL     Hematologic/Lymphatic: The patient denied abnormal bleeding and bruising, abnormal ecchymoses, anemia, lymph node enlargement/mass,  "petechiae and venous  Thrombosis. NORMAL     Allergy/Immunology: The patient denied food allergy and  Allergic rhinitis or conjunctivitis. NORMAL       PE:  /64  Pulse 90  Temp 97.6  F (36.4  C) (Tympanic)  Resp 20  Ht 5' 9.5\" (1.765 m)  Wt 149 lb (67.6 kg)  SpO2 99%  BMI 21.69 kg/m2 Body mass index is 21.69 kg/(m^2).    Constitutional: general appearance, well nourished, well developed, in no acute distress, well developed, appears stated age, normal body habitus,  NORMAL     Eyes:; The patient has normal eyelids sclerae and conjunctivae :NL      Ears/Nose/Throat: external ear, overall: normal appearance; external nose, overall: benign appearance, normal moujth gums and lips  The patient has: NORMAL     Neck: thyroid, overall: normal size, normal consistency, nontender,  NORMAL     Respiratory:  palpation of chest, overall: normal excursion, NORMAL   Clear to percussion and auscultation NORMAL     Tachypnea NORMAL  Color NORMAL     Cardiovascular:  Good color with no peripheral edema NORMAL   Regular sinus rhythm without murmur. Physiologic heart sounds Heart is unelarged  .   Chest/Breast: normal shape NORMAL      Abdominal exam,  Liver and spleen are  unenlarged  NORMAL       Tenderness NORMAL   Scars  NOT APPLICABLE     Urogenital; no renal, flank or bladder  tenderness;  NORMAL     Lymphatic: neck nodes, NORMAL    Other nodes NOT APPLICABLE     Musculoskeletal:  Brief ortho exam normal except:   LEFT ARM AND LEG WEAKNESS AND DYSCOORDINATION     Integument: inspection of skin, no rash, lesions; and, palpation, no induration, no tenderness.  NORMAL     Neurologic mental status, overall: alert and oriented; gait, no ataxia, no unsteadiness; coordination, no tremors; cranial nerves, overall: normal motor, overall: normal bulk, tone. NORMAL     Psychiatric: orientation/consciousness, overall: oriented to person, place and time; behavior/psychomotor activity, no tics, normal psychomotor activity; mood " and affect, overall: normal mood and affect; appearance, overall: well-groomed, good eye contact; speech, overall: normal quality, no aphasia and normal quality, quantity, intact.  NORMAL     Diagnostic Test Results:  Results for orders placed or performed in visit on 10/24/17   Hemoglobin A1c   Result Value Ref Range    Hemoglobin A1C 11.4 (H) 4.3 - 6.0 %         ICD-10-CM    1. H/O: stroke with residual effects I69.30 COPD ACTION PLAN    LEFT  SIDE FEB 08/2017   2. Hyperlipidemia LDL goal <100 E78.5 lisinopril (PRINIVIL/ZESTRIL) 20 MG tablet     atorvastatin (LIPITOR) 20 MG tablet     lisinopril (PRINIVIL/ZESTRIL) 20 MG tablet     atorvastatin (LIPITOR) 20 MG tablet     Lipid panel reflex to direct LDL Fasting     Hemoglobin A1c     ALT   3. Type 2 diabetes mellitus, uncontrolled, with neuropathy (H) E11.40 Albumin Random Urine Quantitative with Creat Ratio    E11.65    4. Hypertension goal BP (blood pressure) < 140/90 I10 metoprolol (TOPROL-XL) 25 MG 24 hr tablet     metoprolol (TOPROL-XL) 25 MG 24 hr tablet     clopidogrel (PLAVIX) 75 MG tablet     Basic metabolic panel   5. Chronic bronchitis, unspecified chronic bronchitis type (H) J42    6. 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 Essential hypertension, benign I10    8. Tobacco use disorder F17.200    9. Gastroesophageal reflux disease without esophagitis K21.9 pantoprazole (PROTONIX) 40 MG EC tablet     pantoprazole (PROTONIX) 40 MG EC tablet   10. Type 2 diabetes mellitus with diabetic polyneuropathy, without long-term current use of insulin (H) E11.42 blood glucose monitoring (ONE TOUCH ULTRA 2) meter device kit     blood glucose calibration (ONETOUCH ULTRA CONTROL) solution     glimepiride (AMARYL) 4 MG tablet   11. Major depressive disorder, single episode, in partial remission (H) F32.4 FLUoxetine (PROZAC) 20 MG capsule   12. Pruritic disorder L29.9 cetirizine (ZYRTEC) 10 MG tablet   13. Benign non-nodular prostatic hyperplasia with lower urinary  tract symptoms N40.1 tamsulosin (FLOMAX) 0.4 MG capsule   14. Cough R05 blood glucose monitoring (NO BRAND SPECIFIED) test strip   15. Pain in joint, pelvic region and thigh, unspecified laterality M25.559 diclofenac (VOLTAREN) 1 % GEL topical gel        .    Side effects benefits and risks thoroughly discussed. .he may come in early if unimproved or getting worse          Importance of adhering to regimen discussed and if medications were dispensed, the importance of taking medications discussed and bringing in the medications after every visit for chronic problems         Please drink 2 glasses of water prior to meals and walk 15-30 minutes after meals    I spent 25 MINUTES SPENT  with patient discussing the following issues   The primary encounter diagnosis was H/O: stroke with residual effects. Diagnoses of Hyperlipidemia LDL goal <100, Type 2 diabetes mellitus, uncontrolled, with neuropathy (H), Hypertension goal BP (blood pressure) < 140/90, Chronic bronchitis, unspecified chronic bronchitis type (H), Disorganized schizophrenia (H), Essential hypertension, benign, Tobacco use disorder, Gastroesophageal reflux disease without esophagitis, Type 2 diabetes mellitus with diabetic polyneuropathy, without long-term current use of insulin (H), Major depressive disorder, single episode, in partial remission (H), Pruritic disorder, Benign non-nodular prostatic hyperplasia with lower urinary tract symptoms, Cough, and Pain in joint, pelvic region and thigh, unspecified laterality were also pertinent to this visit. over half of which involved counseling and coordination of care.    Patient Instructions   (I69.30) H/O: stroke with residual effects  (primary encounter diagnosis)  Comment: LEFT  SIDE FEB 08/2017  Plan: COPD ACTION PLAN             (E78.5) Hyperlipidemia LDL goal <100  Comment:    Plan: lisinopril (PRINIVIL/ZESTRIL) 20 MG tablet,         atorvastatin (LIPITOR) 20 MG tablet, lisinopril         (PRINIVIL/ZESTRIL) 20 MG tablet, atorvastatin         (LIPITOR) 20 MG tablet, Lipid panel reflex to         direct LDL Fasting, Hemoglobin A1c, ALT             (E11.40,  E11.65) Type 2 diabetes mellitus, uncontrolled, with neuropathy (H)  Comment:    Plan: Albumin Random Urine Quantitative with Creat         Ratio             (I10) Hypertension goal BP (blood pressure) < 140/90  Comment:    Plan: metoprolol (TOPROL-XL) 25 MG 24 hr tablet,         metoprolol (TOPROL-XL) 25 MG 24 hr tablet,         clopidogrel (PLAVIX) 75 MG tablet, Basic         metabolic panel             (J42) Chronic bronchitis, unspecified chronic bronchitis type (H)  Comment:    Plan:      (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)  Comment:    Plan:      (I10) Essential hypertension, benign  Comment:    Plan:      (F17.200) Tobacco use disorder  Comment:    Plan:      (K21.9) Gastroesophageal reflux disease without esophagitis  Comment:    Plan: pantoprazole (PROTONIX) 40 MG EC tablet,         pantoprazole (PROTONIX) 40 MG EC tablet             (E11.42) Type 2 diabetes mellitus with diabetic polyneuropathy, without long-term current use of insulin (H)  Comment:    Plan: blood glucose monitoring (ONE TOUCH ULTRA 2)         meter device kit, blood glucose calibration         (ONETOUCH ULTRA CONTROL) solution, glimepiride         (AMARYL) 4 MG tablet             (F32.4) Major depressive disorder, single episode, in partial remission (H)  Comment:    Plan: FLUoxetine (PROZAC) 20 MG capsule             (L29.9) Pruritic disorder  Comment:    Plan: cetirizine (ZYRTEC) 10 MG tablet             (N40.1) Benign non-nodular prostatic hyperplasia with lower urinary tract symptoms  Comment:    Plan: tamsulosin (FLOMAX) 0.4 MG capsule             (R05) Cough  Comment:    Plan: blood glucose monitoring (NO BRAND SPECIFIED)         test strip             (M25.559) Pain in joint, pelvic region and thigh, unspecified laterality  Comment:    Plan: diclofenac  (VOLTAREN) 1 % GEL topical gel                            ALL THE ABOVE PROBLEMS ARE STABLE AND MED CHANGES AS NOTED    Diet:  MEDITERRANEAN DIET     Exercise:  LEFT SIDED PHYSICAL THERAPY FOR STROKE REHAB AND EXERCISE STOP SMOKING   Exercises Range of motion, balance, isometric, and strengthening exercises 30 repetitions twice daily of involved joints      .MONET ALLEN MD 10/24/2017 4:15 PM  October 24, 2017          Answers for HPI/ROS submitted by the patient on 10/24/2017   If you checked off any problems, how difficult have these problems made it for you to do your work, take care of things at home, or get along with other people?: Not difficult at all  PHQ9 TOTAL SCORE: 13

## 2017-10-24 NOTE — LETTER
My Depression Action Plan  Name: Vikram Rodgers   Date of Birth 1970  Date: 10/24/2017    My doctor: Hamlet Tavares   My clinic: [address]  [facility] [id number] [phone]          GREEN    ZONE   Good Control    What it looks like:     Things are going generally well. You have normal up s and down s. You may even feel depressed from time to time, but bad moods usually last less than a day.   What you need to do:  1. Continue to care for yourself (see self care plan)  2. Check your depression survival kit and update it as needed  3. Follow your physician s recommendations including any medication.  4. Do not stop taking medication unless you consult with your physician first.           YELLOW         ZONE Getting Worse    What it looks like:     Depression is starting to interfere with your life.     It may be hard to get out of bed; you may be starting to isolate yourself from others.    Symptoms of depression are starting to last most all day and this has happened for several days.     You may have suicidal thoughts but they are not constant.   What you need to do:     1. Call your care team, your response to treatment will improve if you keep your care team informed of your progress. Yellow periods are signs an adjustment may need to be made.     2. Continue your self-care, even if you have to fake it!    3. Talk to someone in your support network    4. Open up your depression survival kit           RED    ZONE Medical Alert - Get Help    What it looks like:     Depression is seriously interfering with your life.     You may experience these or other symptoms: You can t get out of bed most days, can t work or engage in other necessary activities, you have trouble taking care of basic hygiene, or basic responsibilities, thoughts of suicide or death that will not go away, self-injurious behavior.     What you  need to do:  1. Call your care team and request a same-day appointment. If they are not available (weekends or after hours) call your local crisis line, emergency room or 911.      Electronically signed by: Drea Moses, October 24, 2017    Depression Self Care Plan / Survival Kit    Self-Care for Depression  Here s the deal. Your body and mind are really not as separate as most people think.  What you do and think affects how you feel and how you feel influences what you do and think. This means if you do things that people who feel good do, it will help you feel better.  Sometimes this is all it takes.  There is also a place for medication and therapy depending on how severe your depression is, so be sure to consult with your medical provider and/ or Behavioral Health Consultant if your symptoms are worsening or not improving.     In order to better manage my stress, I will:    Exercise  Get some form of exercise, every day. This will help reduce pain and release endorphins, the  feel good  chemicals in your brain. This is almost as good as taking antidepressants!  This is not the same as joining a gym and then never going! (they count on that by the way ) It can be as simple as just going for a walk or doing some gardening, anything that will get you moving.      Hygiene   Maintain good hygiene (Get out of bed in the morning, Make your bed, Brush your teeth, Take a shower, and Get dressed like you were going to work, even if you are unemployed).  If your clothes don't fit try to get ones that do.    Diet  I will strive to eat foods that are good for me, drink plenty of water, and avoid excessive sugar, caffeine, alcohol, and other mood-altering substances.  Some foods that are helpful in depression are: complex carbohydrates, B vitamins, flaxseed, fish or fish oil, fresh fruits and vegetables.    Psychotherapy  I agree to participate in Individual Therapy (if recommended).    Medication  If prescribed  medications, I agree to take them.  Missing doses can result in serious side effects.  I understand that drinking alcohol, or other illicit drug use, may cause potential side effects.  I will not stop my medication abruptly without first discussing it with my provider.    Staying Connected With Others  I will stay in touch with my friends, family members, and my primary care provider/team.    Use your imagination  Be creative.  We all have a creative side; it doesn t matter if it s oil painting, sand castles, or mud pies! This will also kick up the endorphins.    Witness Beauty  (AKA stop and smell the roses) Take a look outside, even in mid-winter. Notice colors, textures. Watch the squirrels and birds.     Service to others  Be of service to others.  There is always someone else in need.  By helping others we can  get out of ourselves  and remember the really important things.  This also provides opportunities for practicing all the other parts of the program.    Humor  Laugh and be silly!  Adjust your TV habits for less news and crime-drama and more comedy.    Control your stress  Try breathing deep, massage therapy, biofeedback, and meditation. Find time to relax each day.     My support system    Clinic Contact:  Phone number:    Contact 1:  Phone number:    Contact 2:  Phone number:    Yazidi/:  Phone number:    Therapist:  Phone number:    [facility] crisis center:    Phone number:    Other community support:  Phone number:

## 2017-10-24 NOTE — PATIENT INSTRUCTIONS
(I69.30) H/O: stroke with residual effects  (primary encounter diagnosis)  Comment: LEFT  SIDE FEB 08/2017  Plan: COPD ACTION PLAN             (E78.5) Hyperlipidemia LDL goal <100  Comment:    Plan: lisinopril (PRINIVIL/ZESTRIL) 20 MG tablet,         atorvastatin (LIPITOR) 20 MG tablet, lisinopril        (PRINIVIL/ZESTRIL) 20 MG tablet, atorvastatin         (LIPITOR) 20 MG tablet, Lipid panel reflex to         direct LDL Fasting, Hemoglobin A1c, ALT             (E11.40,  E11.65) Type 2 diabetes mellitus, uncontrolled, with neuropathy (H)  Comment:    Plan: Albumin Random Urine Quantitative with Creat         Ratio             (I10) Hypertension goal BP (blood pressure) < 140/90  Comment:    Plan: metoprolol (TOPROL-XL) 25 MG 24 hr tablet,         metoprolol (TOPROL-XL) 25 MG 24 hr tablet,         clopidogrel (PLAVIX) 75 MG tablet, Basic         metabolic panel             (J42) Chronic bronchitis, unspecified chronic bronchitis type (H)  Comment:    Plan:      (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)  Comment:    Plan:      (I10) Essential hypertension, benign  Comment:    Plan:      (F17.200) Tobacco use disorder  Comment:    Plan:      (K21.9) Gastroesophageal reflux disease without esophagitis  Comment:    Plan: pantoprazole (PROTONIX) 40 MG EC tablet,         pantoprazole (PROTONIX) 40 MG EC tablet             (E11.42) Type 2 diabetes mellitus with diabetic polyneuropathy, without long-term current use of insulin (H)  Comment:    Plan: blood glucose monitoring (ONE TOUCH ULTRA 2)         meter device kit, blood glucose calibration         (ONETOUCH ULTRA CONTROL) solution, glimepiride         (AMARYL) 4 MG tablet             (F32.4) Major depressive disorder, single episode, in partial remission (H)  Comment:    Plan: FLUoxetine (PROZAC) 20 MG capsule             (L29.9) Pruritic disorder  Comment:    Plan: cetirizine (ZYRTEC) 10 MG tablet             (N40.1) Benign non-nodular prostatic hyperplasia with lower  urinary tract symptoms  Comment:    Plan: tamsulosin (FLOMAX) 0.4 MG capsule             (R05) Cough  Comment:    Plan: blood glucose monitoring (NO BRAND SPECIFIED)         test strip             (M25.559) Pain in joint, pelvic region and thigh, unspecified laterality  Comment:    Plan: diclofenac (VOLTAREN) 1 % GEL topical gel

## 2017-10-24 NOTE — LETTER
My COPD Action Plan     Name: Vikram Rodgers    YOB: 1970   Date: 10/24/2017    My doctor: MONET ALLEN MD   My clinic: [address] [id number] [phone]  My Controller Medicine: { :350636}   Dose: ***     My Rescue Medicine: { :317522}   Dose: ***     My Flare Up Medicine: { :001597}   Dose: ***     My COPD Severity: { :044215}      Use of Oxygen: { :716479}     Make sure you've had your pneumonia   vaccines.          GREEN ZONE       Doing well today      Usual level of activity and exercise    Usual amount of cough and mucus    No shortness of breath    Usual level of health (thinking clearly, sleeping well, feel like eating) Actions:      Take daily medicines    Use oxygen as prescribed    Follow regular exercise and diet plan    Avoid cigarette smoke and other irritants that harm the lungs           YELLOW ZONE          Having a bad day or flare up      Short of breath more than usual    A lot more sputum (mucus) than usual    Sputum looks yellow, green, tan, brown or bloody    More coughing or wheezing    Fever or chills    Less energy; trouble completing activities    Trouble thinking or focusing    Using quick relief inhaler or nebulizer more often    Poor sleep; symptoms wake me up    Do not feel like eating Actions:      Get plenty of rest    Take daily medicines    Use quick relief inhaler every *** hours    If you use oxygen, call you doctor to see if you should adjust your oxygen    Do breathing exercises or other things to help you relax    Let a loved one, friend or neighbor know you are feeling worse    Call your care team if you have 2 or more symptoms.  Start taking steroids or antibiotics if directed by your care team           RED ZONE       Need medical care now      Severe shortness of breath (feel you can't breathe)    Fever, chills    Not enough breath to do any activity    Trouble  coughing up mucus, walking or talking    Blood in mucus    Frequent coughing   Rescue medicines are not working    Not able to sleep because of breathing    Feel confused or drowsy    Chest pain    Actions:      Call your health care team.  If you cannot reach your care team, call 911 or go to the emergency room.        Electronically signed by: Drea Moses, October 24, 2017  Annual Reminders:  Meet with Care Team, Flu Shot every Fall  Pharmacy:    [facility] - [place], [place] - [address]  [facility] [address] [place] [facility] - [place], [place] - [address] [facility]

## 2017-10-24 NOTE — NURSING NOTE
"Chief Complaint   Patient presents with     Neurologic Problem     STROKE IN 2/2017     Diabetes       Initial /64  Pulse 90  Temp 97.6  F (36.4  C) (Tympanic)  Resp 20  Ht 5' 9.5\" (1.765 m)  Wt 149 lb (67.6 kg)  SpO2 99%  BMI 21.69 kg/m2 Estimated body mass index is 21.69 kg/(m^2) as calculated from the following:    Height as of this encounter: 5' 9.5\" (1.765 m).    Weight as of this encounter: 149 lb (67.6 kg).  Medication Reconciliation: complete   Drea Moses CMA      "

## 2017-10-24 NOTE — LETTER
[address]  [address]  [facility] [address] [phone]                                                                                                           Vikram Glez [place]  [address]  [facility] [address]    October 24, 2017      Dear Vikram,    PATIENT NEEDS PERSONAL CARE ASSISTANT   Patient Active Problem List   Diagnosis     Latent tuberculosis infection     PUD (peptic ulcer disease)     Moderate major depression (H)     CARDIOVASCULAR SCREENING; LDL GOAL LESS THAN 100     Unspecified hyperplasia of prostate with urinary obstruction and other lower urinary tract symptoms (LUTS)     Carpal tunnel syndrome     Disturbance of skin sensation     Vitamin D deficiency     Paranoid schizophrenia (H)     Nocturia     Schizophrenia (H)     Hallucinations     Positive PPD     Essential hypertension, benign     Hyperlipidemia     Tobacco use disorder     Diabetes mellitus, type 2 (H)     Gastroesophageal reflux disease without esophagitis     Health Care Home     Major depression in partial remission (H)     Hyperlipidemia LDL goal <100     Type 2 diabetes mellitus, uncontrolled, with neuropathy (H)     Hypertension goal BP (blood pressure) < 140/90     COPD (chronic obstructive pulmonary disease) (H)     Pain in joint, pelvic region and thigh     Tooth pain     Enlarged prostate with lower urinary tract symptoms (LUTS)     H/O: stroke with residual effects LEFT ARM AND LEG       Current Outpatient Prescriptions   Medication     pantoprazole (PROTONIX) 40 MG EC tablet     metoprolol (TOPROL-XL) 25 MG 24 hr tablet     lisinopril (PRINIVIL/ZESTRIL) 20 MG tablet     atorvastatin (LIPITOR) 20 MG tablet     pantoprazole (PROTONIX) 40 MG EC tablet     metoprolol (TOPROL-XL) 25 MG 24 hr tablet     lisinopril (PRINIVIL/ZESTRIL) 20 MG tablet     atorvastatin (LIPITOR) 20 MG tablet     clopidogrel (PLAVIX) 75 MG tablet     blood glucose  monitoring (ONE TOUCH ULTRA 2) meter device kit     blood glucose calibration (ONETOUCH ULTRA CONTROL) solution     glimepiride (AMARYL) 4 MG tablet     FLUoxetine (PROZAC) 20 MG capsule     cetirizine (ZYRTEC) 10 MG tablet     tamsulosin (FLOMAX) 0.4 MG capsule     blood glucose monitoring (NO BRAND SPECIFIED) test strip     diclofenac (VOLTAREN) 1 % GEL topical gel     acetaminophen (SM ARTHRITIS PAIN RELIEF) 650 MG CR tablet     JANUVIA 100 MG tablet     senna-docusate (SENOKOT-S;PERICOLACE) 8.6-50 MG per tablet     [DISCONTINUED] atorvastatin (LIPITOR) 20 MG tablet     [DISCONTINUED] lisinopril (PRINIVIL/ZESTRIL) 20 MG tablet     [DISCONTINUED] clopidogrel (PLAVIX) 75 MG tablet     [DISCONTINUED] metoprolol (TOPROL-XL) 25 MG 24 hr tablet     [DISCONTINUED] glimepiride (AMARYL) 4 MG tablet     Sodium Fluoride (SF 5000 PLUS) 1.1 % CREA     losartan-hydrochlorothiazide (HYZAAR) 100-12.5 MG per tablet     omeprazole (PRILOSEC) 40 MG capsule     Sodium Fluoride (SF 5000 PLUS) 1.1 % CREA     Ipratropium-Albuterol (COMBIVENT RESPIMAT)  MCG/ACT inhaler     [DISCONTINUED] FLUoxetine (PROZAC) 20 MG capsule     No current facility-administered medications for this visit.       NEEDS HELP with ACTIVITIES OF DAILY LIVING   COOKING CLEANING  MEDICATIONS LAUNDRY   Thank you for choosing [facility].  We appreciate the opportunity to serve you and look forward to supporting your healthcare needs in the future.    If you have any questions or concerns, please call me or my staff at [phone].      Sincerely,    Hamlet Tavares Jr MD

## 2017-10-24 NOTE — LETTER
October 25, 2017      Vikram Rodgers  [address]  [facility] [address]        Dear ,    We are writing to inform you of your test results.    Please make an appointment with your provider to review or follow up on your test results.  Appointments can be made by calling [phone].    WORSENING DIABETES CONTROL   COME BACK SOON TO DISCUSS MEDICINE ADJUSMENTS    Resulted Orders   Hemoglobin A1c   Result Value Ref Range    Hemoglobin A1C 11.4 (H) 4.3 - 6.0 %       If you have any questions or concerns, please call the clinic at the number listed above.       Sincerely,        MONET ALLEN MD

## 2017-10-25 ENCOUNTER — TELEPHONE (OUTPATIENT)
Dept: FAMILY MEDICINE | Facility: CLINIC | Age: 47
End: 2017-10-25

## 2017-10-25 LAB
ALT SERPL W P-5'-P-CCNC: 50 U/L (ref 0–70)
ANION GAP SERPL CALCULATED.3IONS-SCNC: 12 MMOL/L (ref 3–14)
BUN SERPL-MCNC: 15 MG/DL (ref 7–30)
CALCIUM SERPL-MCNC: 9.3 MG/DL (ref 8.5–10.1)
CHLORIDE SERPL-SCNC: 102 MMOL/L (ref 94–109)
CHOLEST SERPL-MCNC: 247 MG/DL
CO2 SERPL-SCNC: 22 MMOL/L (ref 20–32)
CREAT SERPL-MCNC: 0.6 MG/DL (ref 0.66–1.25)
CREAT UR-MCNC: 92 MG/DL
GFR SERPL CREATININE-BSD FRML MDRD: >90 ML/MIN/1.7M2
GLUCOSE SERPL-MCNC: 371 MG/DL (ref 70–99)
HDLC SERPL-MCNC: 41 MG/DL
LDLC SERPL CALC-MCNC: 172 MG/DL
MICROALBUMIN UR-MCNC: 7 MG/L
MICROALBUMIN/CREAT UR: 7.69 MG/G CR (ref 0–17)
NONHDLC SERPL-MCNC: 206 MG/DL
POTASSIUM SERPL-SCNC: 4.2 MMOL/L (ref 3.4–5.3)
SODIUM SERPL-SCNC: 136 MMOL/L (ref 133–144)
TRIGL SERPL-MCNC: 170 MG/DL

## 2017-10-25 NOTE — TELEPHONE ENCOUNTER
blood glucose monitoring (ONE TOUCH ULTRA 2) meter device kit is not covered by [employer].  Please send freestyle lite meter

## 2017-11-01 NOTE — PROGRESS NOTES
"Please send normal lab letter when labs are complete  NORMAL DIABETES URINE PROTEIN TEST   NORMAL LIVER FUNCTION TEST   VERY HIGH FASTING BLOOD SUGAR AT 371MG %  NORMAL LESS THAN 100   NORMAL RENAL FUNCTION AND BLOOD SALTS   POORLY CONTROLLED TOTAL CHOLESTEROL   HIGH  TRIGLYCERIDES   NORMAL HDL OR \"GOOD\" CHOLESTEROL   VERY HIGH LDL OR \"BAD\" CHOLESTEROL   DOUBT YOU ARE TAKING CHOLESTEROL MEDICATIONS   YOU COULD REDUCE RISK FOR STROKE OR HEART ATTACK 35% IF YOU WOULD DO SO  PLEASE COME BACK TO FOLLOW UP  ON THIS WITHIN THE NEXT 4-6 WEEKS   MONET ALLEN JR., MD"

## 2017-11-17 ENCOUNTER — OFFICE VISIT (OUTPATIENT)
Dept: FAMILY MEDICINE | Facility: CLINIC | Age: 47
End: 2017-11-17
Payer: COMMERCIAL

## 2017-11-17 VITALS
TEMPERATURE: 97 F | OXYGEN SATURATION: 99 % | SYSTOLIC BLOOD PRESSURE: 108 MMHG | WEIGHT: 150 LBS | RESPIRATION RATE: 16 BRPM | HEART RATE: 78 BPM | DIASTOLIC BLOOD PRESSURE: 62 MMHG | BODY MASS INDEX: 21.83 KG/M2

## 2017-11-17 DIAGNOSIS — E78.5 HYPERLIPIDEMIA LDL GOAL <100: Chronic | ICD-10-CM

## 2017-11-17 DIAGNOSIS — N40.1 BENIGN PROSTATIC HYPERPLASIA WITH URINARY FREQUENCY: ICD-10-CM

## 2017-11-17 DIAGNOSIS — K27.9 PUD (PEPTIC ULCER DISEASE): ICD-10-CM

## 2017-11-17 DIAGNOSIS — K21.9 GASTROESOPHAGEAL REFLUX DISEASE WITHOUT ESOPHAGITIS: Chronic | ICD-10-CM

## 2017-11-17 DIAGNOSIS — R20.9 DISTURBANCE OF SKIN SENSATION: ICD-10-CM

## 2017-11-17 DIAGNOSIS — F20.0 PARANOID SCHIZOPHRENIA (H): Primary | ICD-10-CM

## 2017-11-17 DIAGNOSIS — K21.00 GASTROESOPHAGEAL REFLUX DISEASE WITH ESOPHAGITIS: ICD-10-CM

## 2017-11-17 DIAGNOSIS — I10 HYPERTENSION GOAL BP (BLOOD PRESSURE) < 140/90: Chronic | ICD-10-CM

## 2017-11-17 DIAGNOSIS — N40.1 BENIGN NON-NODULAR PROSTATIC HYPERPLASIA WITH LOWER URINARY TRACT SYMPTOMS: ICD-10-CM

## 2017-11-17 DIAGNOSIS — R05.9 COUGH: ICD-10-CM

## 2017-11-17 DIAGNOSIS — L29.9 PRURITIC DISORDER: ICD-10-CM

## 2017-11-17 DIAGNOSIS — I69.30 H/O: STROKE WITH RESIDUAL EFFECTS: Chronic | ICD-10-CM

## 2017-11-17 DIAGNOSIS — F32.4 MAJOR DEPRESSIVE DISORDER WITH SINGLE EPISODE, IN PARTIAL REMISSION (H): Chronic | ICD-10-CM

## 2017-11-17 DIAGNOSIS — F32.4 MAJOR DEPRESSIVE DISORDER, SINGLE EPISODE, IN PARTIAL REMISSION (H): Chronic | ICD-10-CM

## 2017-11-17 DIAGNOSIS — M25.559 PAIN IN JOINT, PELVIC REGION AND THIGH, UNSPECIFIED LATERALITY: ICD-10-CM

## 2017-11-17 DIAGNOSIS — K59.01 SLOW TRANSIT CONSTIPATION: ICD-10-CM

## 2017-11-17 DIAGNOSIS — G89.29 CHRONIC RADICULAR LOW BACK PAIN: ICD-10-CM

## 2017-11-17 DIAGNOSIS — I10 ESSENTIAL HYPERTENSION WITH GOAL BLOOD PRESSURE LESS THAN 140/90: ICD-10-CM

## 2017-11-17 DIAGNOSIS — E11.42 TYPE 2 DIABETES MELLITUS WITH DIABETIC POLYNEUROPATHY, WITHOUT LONG-TERM CURRENT USE OF INSULIN (H): ICD-10-CM

## 2017-11-17 DIAGNOSIS — F32.1 MODERATE MAJOR DEPRESSION (H): ICD-10-CM

## 2017-11-17 DIAGNOSIS — M54.16 CHRONIC RADICULAR LOW BACK PAIN: ICD-10-CM

## 2017-11-17 DIAGNOSIS — R35.0 BENIGN PROSTATIC HYPERPLASIA WITH URINARY FREQUENCY: ICD-10-CM

## 2017-11-17 DIAGNOSIS — J42 CHRONIC BRONCHITIS, UNSPECIFIED CHRONIC BRONCHITIS TYPE (H): Chronic | ICD-10-CM

## 2017-11-17 PROCEDURE — 99214 OFFICE O/P EST MOD 30 MIN: CPT | Performed by: FAMILY MEDICINE

## 2017-11-17 RX ORDER — TAMSULOSIN HYDROCHLORIDE 0.4 MG/1
0.4 CAPSULE ORAL DAILY
Qty: 30 CAPSULE | Refills: 11 | Status: SHIPPED | OUTPATIENT
Start: 2017-11-17 | End: 2018-04-03

## 2017-11-17 RX ORDER — CETIRIZINE HYDROCHLORIDE 10 MG/1
10 TABLET ORAL DAILY
Qty: 30 TABLET | Refills: 11 | Status: SHIPPED | OUTPATIENT
Start: 2017-11-17 | End: 2018-02-16

## 2017-11-17 RX ORDER — CLOPIDOGREL BISULFATE 75 MG/1
75 TABLET ORAL DAILY
Qty: 30 TABLET | Refills: 11 | Status: SHIPPED | OUTPATIENT
Start: 2017-11-17 | End: 2018-02-12

## 2017-11-17 RX ORDER — LOSARTAN POTASSIUM AND HYDROCHLOROTHIAZIDE 12.5; 1 MG/1; MG/1
1 TABLET ORAL DAILY
Qty: 30 TABLET | Refills: 11 | Status: SHIPPED | OUTPATIENT
Start: 2017-11-17 | End: 2018-04-03

## 2017-11-17 RX ORDER — GABAPENTIN 100 MG/1
100 CAPSULE ORAL 3 TIMES DAILY
Qty: 90 CAPSULE | Refills: 1 | Status: SHIPPED | OUTPATIENT
Start: 2017-11-17 | End: 2018-01-23

## 2017-11-17 RX ORDER — GLIMEPIRIDE 4 MG/1
4 TABLET ORAL
Qty: 60 TABLET | Refills: 11 | Status: SHIPPED | OUTPATIENT
Start: 2017-11-17 | End: 2017-11-24

## 2017-11-17 ASSESSMENT — PATIENT HEALTH QUESTIONNAIRE - PHQ9
10. IF YOU CHECKED OFF ANY PROBLEMS, HOW DIFFICULT HAVE THESE PROBLEMS MADE IT FOR YOU TO DO YOUR WORK, TAKE CARE OF THINGS AT HOME, OR GET ALONG WITH OTHER PEOPLE: SOMEWHAT DIFFICULT
SUM OF ALL RESPONSES TO PHQ QUESTIONS 1-9: 9
SUM OF ALL RESPONSES TO PHQ QUESTIONS 1-9: 9

## 2017-11-17 NOTE — MR AVS SNAPSHOT
After Visit Summary   11/17/2017    Vikram Rodgers    MRN: [id number]           Patient Information     Date Of Birth          1970        Visit Information        Provider Department      11/17/2017 2:00 PM Hamlet Tavares MD; [employer] [facility]        Today's Diagnoses     Paranoid schizophrenia (H)    -  1    H/O: stroke with residual effects LEFT ARM AND LEG        Chronic bronchitis, unspecified chronic bronchitis type (H)        Type 2 diabetes mellitus, uncontrolled, with neuropathy (H)        Hypertension goal BP (blood pressure) < 140/90        Hyperlipidemia LDL goal <100        Major depressive disorder with single episode, in partial remission (H)        Gastroesophageal reflux disease without esophagitis        Benign prostatic hyperplasia with urinary frequency        Moderate major depression (H)        PUD (peptic ulcer disease)        Gastroesophageal reflux disease with esophagitis        Essential hypertension with goal blood pressure less than 140/90        Slow transit constipation        Chronic radicular low back pain        Pain in joint, pelvic region and thigh, unspecified laterality        Cough        Benign non-nodular prostatic hyperplasia with lower urinary tract symptoms        Pruritic disorder        Major depressive disorder, single episode, in partial remission (H)        Type 2 diabetes mellitus with diabetic polyneuropathy, without long-term current use of insulin (H)          Care Instructions    (F20.0) Paranoid schizophrenia (H)  (primary encounter diagnosis)  Comment:    Plan:     (I69.30) H/O: stroke with residual effects LEFT ARM AND LEG  Comment:    Plan:      (J42) Chronic bronchitis, unspecified chronic bronchitis type (H)  Comment:    Plan:      (E11.40,  E11.65) Type 2 diabetes mellitus, uncontrolled, with neuropathy (H)  Comment:    Plan: sitagliptin (JANUVIA) 100 MG tablet              (I10) Hypertension goal BP (blood pressure) < 140/90  Comment:    Plan:      (E78.5) Hyperlipidemia LDL goal <100  Comment:    Plan:      (F32.4) Major depressive disorder with single episode, in partial remission (H)  Comment:    Plan:      (K21.9) Gastroesophageal reflux disease without esophagitis  Comment:    Plan:      (N40.1,  R35.0) Benign prostatic hyperplasia with urinary frequency  Comment:    Plan:      (F32.1) Moderate major depression (H)  Comment:    Plan:      (K27.9) PUD (peptic ulcer disease)  Comment:    Plan:      (K21.0) Gastroesophageal reflux disease with esophagitis  Comment:    Plan:      (I10) Essential hypertension with goal blood pressure less than 140/90  Comment:    Plan: losartan-hydrochlorothiazide (HYZAAR) 100-12.5         MG per tablet             (K59.01) Slow transit constipation  Comment:    Plan:      (M54.16,  G89.29) Chronic radicular low back pain  Comment:    Plan:      (M25.559) Pain in joint, pelvic region and thigh, unspecified laterality  Comment:    Plan:      (R05) Cough  Comment:    Plan: blood glucose monitoring (NO BRAND SPECIFIED)         test strip             (N40.1) Benign non-nodular prostatic hyperplasia with lower urinary tract symptoms  Comment:    Plan: tamsulosin (FLOMAX) 0.4 MG capsule             (L29.9) Pruritic disorder  Comment:    Plan: cetirizine (ZYRTEC) 10 MG tablet             (F32.4) Major depressive disorder, single episode, in partial remission (H)  Comment:     Plan: FLUoxetine (PROZAC) 20 MG capsule             (E11.42) Type 2 diabetes mellitus with diabetic polyneuropathy, without long-term current use of insulin (H)  Comment:    Plan: glimepiride (AMARYL) 4 MG tablet            Patient Active Problem List   Diagnosis     Latent tuberculosis infection     PUD (peptic ulcer disease)     Moderate major depression (H)     CARDIOVASCULAR SCREENING; LDL GOAL LESS THAN 100     Unspecified hyperplasia of prostate with urinary obstruction and  other lower urinary tract symptoms (LUTS)     Carpal tunnel syndrome     Disturbance of skin sensation     Vitamin D deficiency     Paranoid schizophrenia (H)     Nocturia     Schizophrenia (H)     Hallucinations     Positive PPD     Essential hypertension, benign     Hyperlipidemia     Tobacco use disorder     Diabetes mellitus, type 2 (H)     Gastroesophageal reflux disease without esophagitis     Health Care Home     Major depression in partial remission (H)     Hyperlipidemia LDL goal <100     Type 2 diabetes mellitus, uncontrolled, with neuropathy (H)     Hypertension goal BP (blood pressure) < 140/90     COPD (chronic obstructive pulmonary disease) (H)     Pain in joint, pelvic region and thigh     Tooth pain     Enlarged prostate with lower urinary tract symptoms (LUTS)     H/O: stroke with residual effects LEFT ARM AND LEG     Current Outpatient Prescriptions   Medication     losartan-hydrochlorothiazide (HYZAAR) 100-12.5 MG per tablet     sitagliptin (JANUVIA) 100 MG tablet     blood glucose monitoring (NO BRAND SPECIFIED) test strip     tamsulosin (FLOMAX) 0.4 MG capsule     cetirizine (ZYRTEC) 10 MG tablet     FLUoxetine (PROZAC) 20 MG capsule     glimepiride (AMARYL) 4 MG tablet     clopidogrel (PLAVIX) 75 MG tablet     lisinopril (PRINIVIL/ZESTRIL) 20 MG tablet     atorvastatin (LIPITOR) 20 MG tablet     pantoprazole (PROTONIX) 40 MG EC tablet     metoprolol (TOPROL-XL) 25 MG 24 hr tablet     atorvastatin (LIPITOR) 20 MG tablet     clopidogrel (PLAVIX) 75 MG tablet     blood glucose calibration (ONETOUCH ULTRA CONTROL) solution     diclofenac (VOLTAREN) 1 % GEL topical gel     [DISCONTINUED] metoprolol (TOPROL-XL) 25 MG 24 hr tablet     [DISCONTINUED] lisinopril (PRINIVIL/ZESTRIL) 20 MG tablet     [DISCONTINUED] glimepiride (AMARYL) 4 MG tablet     [DISCONTINUED] FLUoxetine (PROZAC) 20 MG capsule     [DISCONTINUED] JANUVIA 100 MG tablet     Sodium Fluoride (SF 5000 PLUS) 1.1 % CREA     Sodium Fluoride  "(SF 5000 PLUS) 1.1 % CREA     Ipratropium-Albuterol (COMBIVENT RESPIMAT)  MCG/ACT inhaler     [DISCONTINUED] losartan-hydrochlorothiazide (HYZAAR) 100-12.5 MG per tablet     No current facility-administered medications for this visit.                    Follow-ups after your visit        Who to contact     If you have questions or need follow up information about today's clinic visit or your schedule please contact [facility] directly at [phone].  Normal or non-critical lab and imaging results will be communicated to you by [employer], letter or phone within 4 business days after the clinic has received the results. If you do not hear from us within 7 days, please contact the clinic through [employer] or phone. If you have a critical or abnormal lab result, we will notify you by phone as soon as possible.  Submit refill requests through [employer] or call your pharmacy and they will forward the refill request to us. Please allow 3 business days for your refill to be completed.          Additional Information About Your Visit        [employer] Information     [employer] lets you send messages to your doctor, view your test results, renew your prescriptions, schedule appointments and more. To sign up, go to [email or web address] . Click on \"Log in\" on the left side of the screen, which will take you to the Welcome page. Then click on \"Sign up Now\" on the right side of the page.     You will be asked to enter the access code listed below, as well as some personal information. Please follow the directions to create your username and password.     Your access code is: [email or web address]  Expires: 2018  3:32 PM     Your access code will  in 90 days. If you need help or a new code, please call your [facility] or [phone].        Care EveryWhere ID     This is your Care EveryWhere ID. This could be used by other organizations to access your [facility] medical " records  [id number]        Your Vitals Were     Pulse Temperature Respirations Pulse Oximetry BMI (Body Mass Index)       78 97  F (36.1  C) (Tympanic) 16 99% 21.83 kg/m2        Blood Pressure from Last 3 Encounters:   11/17/17 108/62   10/24/17 104/64   09/22/16 126/66    Weight from Last 3 Encounters:   11/17/17 150 lb (68 kg)   10/24/17 149 lb (67.6 kg)   09/22/16 149 lb (67.6 kg)              Today, you had the following     No orders found for display         Today's Medication Changes          These changes are accurate as of: 11/17/17  3:26 PM.  If you have any questions, ask your nurse or doctor.               These medicines have changed or have updated prescriptions.        Dose/Directions    * clopidogrel 75 MG tablet   Commonly known as:  PLAVIX   This may have changed:  Another medication with the same name was added. Make sure you understand how and when to take each.   Used for:  Hypertension goal BP (blood pressure) < 140/90   Changed by:  Hamlet Tavares MD        Dose:  75 mg   Take 1 tablet (75 mg) by mouth daily   Quantity:  39 tablet   Refills:  0       * clopidogrel 75 MG tablet   Commonly known as:  PLAVIX   This may have changed:  You were already taking a medication with the same name, and this prescription was added. Make sure you understand how and when to take each.   Used for:  H/O: stroke with residual effects   Changed by:  Hamlet Tavares MD        Dose:  75 mg   Take 1 tablet (75 mg) by mouth daily   Quantity:  30 tablet   Refills:  11       sitagliptin 100 MG tablet   Commonly known as:  JANUVIA   This may have changed:  See the new instructions.   Used for:  Type 2 diabetes mellitus, uncontrolled, with neuropathy (H)   Changed by:  Hamlet Tavares MD        TAKE 1 TABLET (100 MG) BY MOUTH DAILY   Quantity:  90 tablet   Refills:  0       * Notice:  This list has 2 medication(s) that are the same as other medications prescribed for you. Read the  directions carefully, and ask your doctor or other care provider to review them with you.         Where to get your medicines      These medications were sent to [facility] [address] [place], [place] - [address]  [address] [address]     Phone:  [phone]     blood glucose monitoring test strip    cetirizine 10 MG tablet    clopidogrel 75 MG tablet    FLUoxetine 20 MG capsule    glimepiride 4 MG tablet    losartan-hydrochlorothiazide 100-12.5 MG per tablet    sitagliptin 100 MG tablet    tamsulosin 0.4 MG capsule                Primary Care Provider Office Phone # Fax #    Hamlet Aury Tavares [phone] [address] [facility] [address]        Equal Access to Services     MARISELA PICHARDO : Hadii michael oneal hadasho Soubaldo, waaxda luqadaha, qaybta kaalmada adeegyada, javier harrison. So [facility] [phone].    ATENCIÓN: Si habla español, tiene a mathur disposición servicios gratuitos de asistencia lingüística. Ll[facility] [phone].    We comply with applicable federal civil rights laws and [place] laws. We do not discriminate on the basis of race, color, national origin, age, disability, sex, sexual orientation, or gender identity.            Thank you!     Thank you for choosing [facility]  for your care. Our goal is always to provide you with excellent care. Hearing back from our patients is one way we can continue to improve our services. Please take a few minutes to complete the written survey that you may receive in the mail after your visit with us. Thank you!             Your Updated Medication List - Protect others around you: Learn how to safely use, store and throw away your medicines at www.disposemymeds.org.          This list is accurate as of: 11/17/17  3:26 PM.  Always use your most recent med list.                   Brand Name Dispense Instructions for use Diagnosis    * atorvastatin  20 MG tablet    LIPITOR    90 tablet    Take 1 tablet (20 mg) by mouth daily    Hyperlipidemia LDL goal <100       * atorvastatin 20 MG tablet    LIPITOR    90 tablet    Take 1 tablet (20 mg) by mouth daily    Hyperlipidemia LDL goal <100       blood glucose calibration solution     1 Bottle    Use to calibrate blood glucose monitor as directed.    Type 2 diabetes mellitus with diabetic polyneuropathy, without long-term current use of insulin (H)       blood glucose monitoring test strip    no brand specified    100 each    1 strip by In Vitro route daily    Cough       cetirizine 10 MG tablet    zyrTEC    30 tablet    Take 1 tablet (10 mg) by mouth daily    Pruritic disorder       * clopidogrel 75 MG tablet    PLAVIX    39 tablet    Take 1 tablet (75 mg) by mouth daily    Hypertension goal BP (blood pressure) < 140/90       * clopidogrel 75 MG tablet    PLAVIX    30 tablet    Take 1 tablet (75 mg) by mouth daily    H/O: stroke with residual effects       diclofenac 1 % Gel topical gel    VOLTAREN    100 g    APPLY 4 GRAMS TO KNEES OR 2 GRAMS TO HANDS BY TOPICAL ROUTE FOUR TIMES DAILY USING ENCLOSED DOSING CARD    Pain in joint, pelvic region and thigh, unspecified laterality       FLUoxetine 20 MG capsule    PROzac    30 capsule    Take 1 capsule (20 mg) by mouth daily    Major depressive disorder, single episode, in partial remission (H)       glimepiride 4 MG tablet    AMARYL    60 tablet    Take 1 tablet (4 mg) by mouth every morning (before breakfast)    Type 2 diabetes mellitus with diabetic polyneuropathy, without long-term current use of insulin (H)       Ipratropium-Albuterol  MCG/ACT inhaler    COMBIVENT RESPIMAT    1 Inhaler    Inhale 1 puff into the lungs 4 times daily Not to exceed 6 doses per day.    Wheeze       lisinopril 20 MG tablet    PRINIVIL/ZESTRIL    90 tablet    Take 1 tablet (20 mg) by mouth daily    Hyperlipidemia LDL goal <100       losartan-hydrochlorothiazide 100-12.5 MG per tablet     HYZAAR    30 tablet    Take 1 tablet by mouth daily    Essential hypertension with goal blood pressure less than 140/90       metoprolol 25 MG 24 hr tablet    TOPROL-XL    30 tablet    Take 1 tablet (25 mg) by mouth daily    Hypertension goal BP (blood pressure) < 140/90       pantoprazole 40 MG EC tablet    PROTONIX    30 tablet    TAKE 1 TABLET (40 MG) BY MOUTH DAILY, 30-60 MINUTES BEFORE A MEAL.    Gastroesophageal reflux disease without esophagitis       sitagliptin 100 MG tablet    JANUVIA    90 tablet    TAKE 1 TABLET (100 MG) BY MOUTH DAILY    Type 2 diabetes mellitus, uncontrolled, with neuropathy (H)       * Sodium Fluoride 1.1 % Crea    SF 5000 PLUS    51 g    use daily for brushing    Tooth pain       * Sodium Fluoride 1.1 % Crea    SF 5000 PLUS    51 g    use for daily brushing    Tooth pain       tamsulosin 0.4 MG capsule    FLOMAX    30 capsule    Take 1 capsule (0.4 mg) by mouth daily    Benign non-nodular prostatic hyperplasia with lower urinary tract symptoms       * Notice:  This list has 6 medication(s) that are the same as other medications prescribed for you. Read the directions carefully, and ask your doctor or other care provider to review them with you.

## 2017-11-17 NOTE — LETTER
[facility]  [address]  [address]  [facility] [address]  [phone]                                                                                                           Vikram [place]  [address]  [facility] [address]    November 17, 2017      Dear Vikram,    Patient Active Problem List   Diagnosis     Latent tuberculosis infection     PUD (peptic ulcer disease)     Moderate major depression (H)     CARDIOVASCULAR SCREENING; LDL GOAL LESS THAN 100     Unspecified hyperplasia of prostate with urinary obstruction and other lower urinary tract symptoms (LUTS)     Carpal tunnel syndrome     Disturbance of skin sensation     Vitamin D deficiency     Paranoid schizophrenia (H)     Nocturia     Schizophrenia (H)     Hallucinations     Positive PPD     Essential hypertension, benign     Hyperlipidemia     Tobacco use disorder     Diabetes mellitus, type 2 (H)     Gastroesophageal reflux disease without esophagitis     Health Care Home     Major depression in partial remission (H)     Hyperlipidemia LDL goal <100     Type 2 diabetes mellitus, uncontrolled, with neuropathy (H)     Hypertension goal BP (blood pressure) < 140/90     COPD (chronic obstructive pulmonary disease) (H)     Pain in joint, pelvic region and thigh     Tooth pain     Enlarged prostate with lower urinary tract symptoms (LUTS)     H/O: stroke with residual effects LEFT ARM AND LEG     UNABLE TO GO UP[ AND DOWN EASILY BECAUSE OF STROKE    BECAUSE OF THAT I AM CONCERNED ABOUT [employer]     NEEDS TO HAVE A LOWER DOWN APARTMENT     Thank you for choosing [facility].  We appreciate the opportunity to serve you and look forward to supporting your healthcare needs in the future.    If you have any questions or concerns, please call me or my staff at [phone].      Sincerely,    Hamlet Tavares Jr MD

## 2017-11-17 NOTE — PATIENT INSTRUCTIONS
(F20.0) Paranoid schizophrenia (H)  (primary encounter diagnosis)  Comment:    Plan:     (I69.30) H/O: stroke with residual effects LEFT ARM AND LEG  Comment:    Plan:      (J42) Chronic bronchitis, unspecified chronic bronchitis type (H)  Comment:    Plan:      (E11.40,  E11.65) Type 2 diabetes mellitus, uncontrolled, with neuropathy (H)  Comment:    Plan: sitagliptin (JANUVIA) 100 MG tablet             (I10) Hypertension goal BP (blood pressure) < 140/90  Comment:    Plan:      (E78.5) Hyperlipidemia LDL goal <100  Comment:    Plan:      (F32.4) Major depressive disorder with single episode, in partial remission (H)  Comment:    Plan:      (K21.9) Gastroesophageal reflux disease without esophagitis  Comment:    Plan:      (N40.1,  R35.0) Benign prostatic hyperplasia with urinary frequency  Comment:    Plan:      (F32.1) Moderate major depression (H)  Comment:    Plan:      (K27.9) PUD (peptic ulcer disease)  Comment:    Plan:      (K21.0) Gastroesophageal reflux disease with esophagitis  Comment:    Plan:      (I10) Essential hypertension with goal blood pressure less than 140/90  Comment:    Plan: losartan-hydrochlorothiazide (HYZAAR) 100-12.5         MG per tablet             (K59.01) Slow transit constipation  Comment:    Plan:      (M54.16,  G89.29) Chronic radicular low back pain  Comment:    Plan:      (M25.559) Pain in joint, pelvic region and thigh, unspecified laterality  Comment:    Plan:      (R05) Cough  Comment:    Plan: blood glucose monitoring (NO BRAND SPECIFIED)         test strip             (N40.1) Benign non-nodular prostatic hyperplasia with lower urinary tract symptoms  Comment:    Plan: tamsulosin (FLOMAX) 0.4 MG capsule             (L29.9) Pruritic disorder  Comment:    Plan: cetirizine (ZYRTEC) 10 MG tablet             (F32.4) Major depressive disorder, single episode, in partial remission (H)  Comment:     Plan: FLUoxetine (PROZAC) 20 MG capsule             (E11.42) Type 2 diabetes  mellitus with diabetic polyneuropathy, without long-term current use of insulin (H)  Comment:    Plan: glimepiride (AMARYL) 4 MG tablet            Patient Active Problem List   Diagnosis     Latent tuberculosis infection     PUD (peptic ulcer disease)     Moderate major depression (H)     CARDIOVASCULAR SCREENING; LDL GOAL LESS THAN 100     Unspecified hyperplasia of prostate with urinary obstruction and other lower urinary tract symptoms (LUTS)     Carpal tunnel syndrome     Disturbance of skin sensation     Vitamin D deficiency     Paranoid schizophrenia (H)     Nocturia     Schizophrenia (H)     Hallucinations     Positive PPD     Essential hypertension, benign     Hyperlipidemia     Tobacco use disorder     Diabetes mellitus, type 2 (H)     Gastroesophageal reflux disease without esophagitis     Health Care Home     Major depression in partial remission (H)     Hyperlipidemia LDL goal <100     Type 2 diabetes mellitus, uncontrolled, with neuropathy (H)     Hypertension goal BP (blood pressure) < 140/90     COPD (chronic obstructive pulmonary disease) (H)     Pain in joint, pelvic region and thigh     Tooth pain     Enlarged prostate with lower urinary tract symptoms (LUTS)     H/O: stroke with residual effects LEFT ARM AND LEG     Current Outpatient Prescriptions   Medication     losartan-hydrochlorothiazide (HYZAAR) 100-12.5 MG per tablet     sitagliptin (JANUVIA) 100 MG tablet     blood glucose monitoring (NO BRAND SPECIFIED) test strip     tamsulosin (FLOMAX) 0.4 MG capsule     cetirizine (ZYRTEC) 10 MG tablet     FLUoxetine (PROZAC) 20 MG capsule     glimepiride (AMARYL) 4 MG tablet     clopidogrel (PLAVIX) 75 MG tablet     lisinopril (PRINIVIL/ZESTRIL) 20 MG tablet     atorvastatin (LIPITOR) 20 MG tablet     pantoprazole (PROTONIX) 40 MG EC tablet     metoprolol (TOPROL-XL) 25 MG 24 hr tablet     atorvastatin (LIPITOR) 20 MG tablet     clopidogrel (PLAVIX) 75 MG tablet     blood glucose calibration  (ONETOUCH ULTRA CONTROL) solution     diclofenac (VOLTAREN) 1 % GEL topical gel     [DISCONTINUED] metoprolol (TOPROL-XL) 25 MG 24 hr tablet     [DISCONTINUED] lisinopril (PRINIVIL/ZESTRIL) 20 MG tablet     [DISCONTINUED] glimepiride (AMARYL) 4 MG tablet     [DISCONTINUED] FLUoxetine (PROZAC) 20 MG capsule     [DISCONTINUED] JANUVIA 100 MG tablet     Sodium Fluoride (SF 5000 PLUS) 1.1 % CREA     Sodium Fluoride (SF 5000 PLUS) 1.1 % CREA     Ipratropium-Albuterol (COMBIVENT RESPIMAT)  MCG/ACT inhaler     [DISCONTINUED] losartan-hydrochlorothiazide (HYZAAR) 100-12.5 MG per tablet     No current facility-administered medications for this visit.

## 2017-11-17 NOTE — PROGRESS NOTES
"  SUBJECTIVE:   Vikram Rodgers is a 47 year old male who presents to clinic today for the following health issues:      Lab results       Duration: 10/24/2017    Description (location/character/radiation): na    Intensity:  na    Accompanying signs and symptoms: na    History (similar episodes/previous evaluation): None    Precipitating or alleviating factors: None    Therapies tried and outcome: None         Medication Followup of brought all meds to clinic    Taking Medication as prescribed: yes    Side Effects:  None    Medication Helping Symptoms:  Yes    TB LATENT     PEPTIC ULCER DISEASE IMPROVED     DEPRESSION MAJOR     LDL OR \"BAD\" CHOLESTEROL  GOAL < 100     BENIGN PROSTATIC HYPERTROPHY     CARPAL TUNNEL SYNDROME     VITAMIN D REPLACEMENT      PARANOID SCHIZOPHRENIA     HISTORY OF     LDL OR \"BAD\" CHOLESTEROL  GOAL < 100        TOBACCO USE DISORDER     DIABETES 2 GOAL 8%     GASTROESOPHAGEAL REFLUX DISEASE WITHOUT ESOPHAGITIS     DEPRESSION CONTROLLED     STROKE RESIDUAL DEFECTS LEFT ARM AND LEG                    Problem list and histories reviewed & adjusted, as indicated.  Additional history: as documented    .  Current Outpatient Prescriptions   Medication Sig Dispense Refill     losartan-hydrochlorothiazide (HYZAAR) 100-12.5 MG per tablet Take 1 tablet by mouth daily 30 tablet 11     sitagliptin (JANUVIA) 100 MG tablet TAKE 1 TABLET (100 MG) BY MOUTH DAILY 90 tablet 0     blood glucose monitoring (NO BRAND SPECIFIED) test strip 1 strip by In Vitro route daily 100 each 11     tamsulosin (FLOMAX) 0.4 MG capsule Take 1 capsule (0.4 mg) by mouth daily 30 capsule 11     cetirizine (ZYRTEC) 10 MG tablet Take 1 tablet (10 mg) by mouth daily 30 tablet 11     FLUoxetine (PROZAC) 20 MG capsule Take 1 capsule (20 mg) by mouth daily 30 capsule 11     glimepiride (AMARYL) 4 MG tablet Take 1 tablet (4 mg) by mouth every morning (before breakfast) 60 tablet 11     clopidogrel (PLAVIX) 75 MG tablet Take 1 tablet " (75 mg) by mouth daily 30 tablet 11     gabapentin (NEURONTIN) 100 MG capsule Take 1 capsule (100 mg) by mouth 3 times daily 90 capsule 1     lisinopril (PRINIVIL/ZESTRIL) 20 MG tablet Take 1 tablet (20 mg) by mouth daily 90 tablet 0     atorvastatin (LIPITOR) 20 MG tablet Take 1 tablet (20 mg) by mouth daily 90 tablet 0     pantoprazole (PROTONIX) 40 MG EC tablet TAKE 1 TABLET (40 MG) BY MOUTH DAILY, 30-60 MINUTES BEFORE A MEAL. 30 tablet 10     metoprolol (TOPROL-XL) 25 MG 24 hr tablet Take 1 tablet (25 mg) by mouth daily 30 tablet 0     atorvastatin (LIPITOR) 20 MG tablet Take 1 tablet (20 mg) by mouth daily 90 tablet 0     clopidogrel (PLAVIX) 75 MG tablet Take 1 tablet (75 mg) by mouth daily 39 tablet 0     blood glucose calibration (ONETOUCH ULTRA CONTROL) solution Use to calibrate blood glucose monitor as directed. 1 Bottle 11     diclofenac (VOLTAREN) 1 % GEL topical gel APPLY 4 GRAMS TO KNEES OR 2 GRAMS TO HANDS BY TOPICAL ROUTE FOUR TIMES DAILY USING ENCLOSED DOSING CARD 100 g 0     [DISCONTINUED] metoprolol (TOPROL-XL) 25 MG 24 hr tablet Take 1 tablet (25 mg) by mouth daily (Patient not taking: Reported on 11/17/2017) 30 tablet 0     [DISCONTINUED] lisinopril (PRINIVIL/ZESTRIL) 20 MG tablet Take 1 tablet (20 mg) by mouth daily 90 tablet 0     [DISCONTINUED] glimepiride (AMARYL) 4 MG tablet Take 1 tablet (4 mg) by mouth every morning (before breakfast) 60 tablet 11     [DISCONTINUED] FLUoxetine (PROZAC) 20 MG capsule Take 1 capsule (20 mg) by mouth daily 30 capsule 11     [DISCONTINUED] JANUVIA 100 MG tablet TAKE 1 TABLET (100 MG) BY MOUTH DAILY 90 tablet 0     [DISCONTINUED] losartan-hydrochlorothiazide (HYZAAR) 100-12.5 MG per tablet Take 1 tablet by mouth daily 30 tablet 11        No Known Allergies    Immunization History   Administered Date(s) Administered     Influenza (IIV3) 10/04/2010, 09/30/2013     Influenza Vaccine IM 3yrs+ 4 Valent IIV4 10/06/2014     Pneumococcal 23 valent 09/30/2013     TD  (ADULT, 7+) 2010         reports that he does not drink alcohol.      reports that he does not use illicit drugs.    family history includes Asthma in his father; C.A.D. in his father; DIABETES in his father; Hypertension in his father; Neurologic Disorder in his brother; Unknown/Adopted in his mother. There is no history of Cancer - colorectal.    indicated that the status of his no family hx of is unknown. He indicated that the status of his mother is unknown. He indicated that his father is . He indicated that the status of his brother is unknown.      has a past surgical history that includes ENT surgery.     reports that he does not currently engage in sexual activity.  .  Pediatric History   Patient Guardian Status     Not on file.     Other Topics Concern     Parent/Sibling W/ Cabg, Mi Or Angioplasty Before 65f 55m? No     unknown     Social History Narrative         reports that he quit smoking about 9 months ago. His smoking use included Cigarettes. He smoked 0.25 packs per day. He has never used smokeless tobacco.    Medical, social, surgical, and family histories reviewed.    Labs reviewed in EPIC  Patient Active Problem List   Diagnosis     Latent tuberculosis infection     PUD (peptic ulcer disease)     Moderate major depression (H)     CARDIOVASCULAR SCREENING; LDL GOAL LESS THAN 100     Unspecified hyperplasia of prostate with urinary obstruction and other lower urinary tract symptoms (LUTS)     Carpal tunnel syndrome     Disturbance of skin sensation     Vitamin D deficiency     Paranoid schizophrenia (H)     Nocturia     Schizophrenia (H)     Hallucinations     Positive PPD     Essential hypertension, benign     Hyperlipidemia     Tobacco use disorder     Diabetes mellitus, type 2 (H)     Gastroesophageal reflux disease without esophagitis     Health Care Home     Major depression in partial remission (H)     Hyperlipidemia LDL goal <100     Type 2 diabetes mellitus, uncontrolled, with  neuropathy (H)     Hypertension goal BP (blood pressure) < 140/90     COPD (chronic obstructive pulmonary disease) (H)     Pain in joint, pelvic region and thigh     Tooth pain     Enlarged prostate with lower urinary tract symptoms (LUTS)     H/O: stroke with residual effects LEFT ARM AND LEG     Past Surgical History:   Procedure Laterality Date     ENT SURGERY      sinuses       Social History   Substance Use Topics     Smoking status: Former Smoker     Packs/day: 0.25     Types: Cigarettes     Quit date: 2/16/2017     Smokeless tobacco: Never Used     Alcohol use No     Family History   Problem Relation Age of Onset     Asthma Father      DIABETES Father      Hypertension Father      C.A.D. Father      Neurologic Disorder Brother      Unknown/Adopted Mother      Cancer - colorectal No family hx of          No Known Allergies  Recent Labs   Lab Test  10/24/17   1637  09/22/16   1637  02/15/16   1655  10/06/14   0833   04/08/13   1232   A1C  11.4*  9.0*   --   8.9*   < >  11.3*   LDL  172*  145*  168*  171*   < >  153*   HDL  41  39*  34*  36*   --   32*   TRIG  170*  264*  178*  134   --   338*   ALT  50  44  39   --    < >  44   CR  0.60*  0.85  0.70   --    < >  0.80   GFRESTIMATED  >90  >90  >90   --    < >  >90   GFRESTBLACK  >90  >90  >90   --    < >  >90   POTASSIUM  4.2  4.8  4.3   --    < >  4.2   TSH   --    --   1.01  1.01   --    --   1.87    < > = values in this interval not displayed.        BP Readings from Last 6 Encounters:   11/17/17 108/62   10/24/17 104/64   09/22/16 126/66   06/28/16 132/70   02/15/16 126/64   01/08/15 154/90       Wt Readings from Last 3 Encounters:   11/17/17 150 lb (68 kg)   10/24/17 149 lb (67.6 kg)   09/22/16 149 lb (67.6 kg)         Positive symptoms or findings indicated by bold designation:     ROS: 10 point ROS neg other than the symptoms noted above in the HPI.except  has Latent tuberculosis infection; PUD (peptic ulcer disease); Moderate major depression (H);  "CARDIOVASCULAR SCREENING; LDL GOAL LESS THAN 100; Unspecified hyperplasia of prostate with urinary obstruction and other lower urinary tract symptoms (LUTS); Carpal tunnel syndrome; Disturbance of skin sensation; Vitamin D deficiency; Paranoid schizophrenia (H); Nocturia; Schizophrenia (H); Hallucinations; Positive PPD; Essential hypertension, benign; Hyperlipidemia; Tobacco use disorder; Diabetes mellitus, type 2 (H); Gastroesophageal reflux disease without esophagitis; Health Care Home; Major depression in partial remission (H); Hyperlipidemia LDL goal <100; Type 2 diabetes mellitus, uncontrolled, with neuropathy (H); Hypertension goal BP (blood pressure) < 140/90; COPD (chronic obstructive pulmonary disease) (H); Pain in joint, pelvic region and thigh; Tooth pain; Enlarged prostate with lower urinary tract symptoms (LUTS); and H/O: stroke with residual effects LEFT ARM AND LEG on his problem list.   Constitutional: The patient denied fatigue, fever, insomnia, night sweats, recent illness and weight loss.  WEIGHT STABLE AND NORMAL     Eyes: The patient denied blindness, eye pain, eye tearing, photophobia, vision change and visual disturbance. PRESBYOPIA       Ears/Nose/Throat/Neck: The patient denied dizziness, facial pain, hearing loss, nasal discharge, oral pain, otalgia, postnasal drip, sinus congestion, sore throat, tinnitus and voice change.   NORMAL HEARING     Cardiovascular: The patient denied arrhythmia, chest pain/pressure, claudication, edema, exercise intolerance, fatigue, orthopnea, palpitations and syncope. LDL OR \"BAD\" CHOLESTEROL  GOAL < 100   HYPERTENSION WITH GOAL OF LESS THAN 140/80 NON ADHERENT     Respiratory: The patient denied asthma, chest congestion, cough, dyspnea on exertion, dyspnea/shortness of breath, hemoptysis, pedal edema, pleuritic pain, productive sputum, snoring and wheezing.   CHRONIC OBSTRUCTIVE LUNG DISEASE   SMOKER QUIT     Gastrointestinal: The patient denied abdominal " pain, anorexia, constipation, diarrhea, dysphagia, gastroesophageal reflux, hematochezia, hemorrhoids, melena, nausea and vomiting . GASTROESOPHAGEAL REFLUX DISEASE WITHOUT ESOPHAGITIS ON PROTON PUMP INHIBITOR CURRENTLY PROTONIX      Genitourinary/Nephrology: The patient denied breast complaint, dysuria, nocturia sexual dysfunction, t, urinary frequency, urinary incontinence, urinary urgency    BENIGN PROSTATIC HYPERTROPHY SYMPTOMS ON TREATMENT     Musculoskeletal: The patient denied arthralgia(s), back pain, joint complaint, muscle weakness, myalgias, osteoporosis, sciatica, stiffness and swelling.  CARPAL TUNNEL SYNDROME IMPROVED   BILATERAL NEUROPATHY     Dermatoligic:: The patient denied acne, dermatitis, ecchymosis, itching, mole change, rash, skin cancer, skin lesion and sores.      Neurologic: The patient denied dizziness, gait abnormality, headache, memory loss, mental status change, paresis, paresthesia, seizure, syncope, tremor and vision change. PERIPHERAL NEUROPATHY   LEFT SIDED STROKE SYMPTOMS   LEFT ARM AND LEG PAIN   Psychiatric: The patient denied anxiety, depression, disturbances of memory, drug abuse, insomnia, mood swings and relationship difficulties.  PARANOID SCHIZOPHRENIA AND DEPRESSION     Endocrine: The patient denied , goiter, obesity, polyuria and thyroid disease.  DIABETES 2 GOAL 8% GOOD CONTROL     Hematologic/Lymphatic: The patient denied abnormal bleeding and bruising, abnormal ecchymoses, anemia, lymph node enlargement/mass, petechiae and venous  Thrombosis.      Allergy/Immunology: The patient denied food allergy and  Allergic rhinitis or conjunctivitis.        PE:  /62  Pulse 78  Temp 97  F (36.1  C) (Tympanic)  Resp 16  Wt 150 lb (68 kg)  SpO2 99%  BMI 21.83 kg/m2 Body mass index is 21.83 kg/(m^2).    Constitutional: general appearance, well nourished, well developed, in no acute distress, well developed, appears stated age, normal body habitus,      Eyes:; The patient  has normal eyelids sclerae and conjunctivae : NORMAL      Ears/Nose/Throat: external ear, overall: normal appearance; external nose, overall: benign appearance, normal moujth gums and lips  The patient has: NORMAL     Neck: thyroid, overall: normal size, normal consistency, nontender, NORMAL     Respiratory:  palpation of chest, overall: normal excursion, NORMAL   Clear to percussion and auscultation NORMAL     Tachypnea NORMAL  Color  NORMAL     Cardiovascular:  Good color with no peripheral edema  NORMAL   Regular sinus rhythm without murmur. Physiologic heart sounds Heart is unelarged  .   Chest/Breast: normal shape  NORMAL      Abdominal exam,  Liver and spleen are  unenlarged  NORMAL       Tenderness  NORMAL   Scars NORMAL     Urogenital; no renal, flank or bladder  tenderness; NORMAL     Lymphatic: neck nodes, NORMAL    Other nodes  NOT APPLICABLE     Musculoskeletal:  Brief ortho exam normal except:  LEFT SIDED WEAKNESS     Integument: inspection of skin, no rash, lesions; and, palpation, no induration, no tenderness.      Neurologic mental status, overall: alert and oriented; gait, no ataxia, no unsteadiness; coordination, no tremors; cranial nerves, overall: normal motor, overall: normal bulk, tone. NORMAL     Psychiatric: orientation/consciousness, overall: oriented to person, place and time; behavior/psychomotor activity, no tics, normal psychomotor activity; mood and affect, overall: normal mood and affect; appearance, overall: well-groomed, good eye contact; speech, overall: normal quality, no aphasia and normal quality, quantity, intact. NORMAL     Diagnostic Test Results:  Results for orders placed or performed in visit on 10/24/17   Basic metabolic panel   Result Value Ref Range    Sodium 136 133 - 144 mmol/L    Potassium 4.2 3.4 - 5.3 mmol/L    Chloride 102 94 - 109 mmol/L    Carbon Dioxide 22 20 - 32 mmol/L    Anion Gap 12 3 - 14 mmol/L    Glucose 371 (H) 70 - 99 mg/dL    Urea Nitrogen 15 7 - 30  mg/dL    Creatinine 0.60 (L) 0.66 - 1.25 mg/dL    GFR Estimate >90 >60 mL/min/1.7m2    GFR Estimate If Black >90 >60 mL/min/1.7m2    Calcium 9.3 8.5 - 10.1 mg/dL   Lipid panel reflex to direct LDL Fasting   Result Value Ref Range    Cholesterol 247 (H) <200 mg/dL    Triglycerides 170 (H) <150 mg/dL    HDL Cholesterol 41 >39 mg/dL    LDL Cholesterol Calculated 172 (H) <100 mg/dL    Non HDL Cholesterol 206 (H) <130 mg/dL   Albumin Random Urine Quantitative with Creat Ratio   Result Value Ref Range    Creatinine Urine 92 mg/dL    Albumin Urine mg/L 7 mg/L    Albumin Urine mg/g Cr 7.69 0 - 17 mg/g Cr   Hemoglobin A1c   Result Value Ref Range    Hemoglobin A1C 11.4 (H) 4.3 - 6.0 %   ALT   Result Value Ref Range    ALT 50 0 - 70 U/L         ICD-10-CM    1. Paranoid schizophrenia (H) F20.0    2. H/O: stroke with residual effects LEFT ARM AND LEG I69.30 clopidogrel (PLAVIX) 75 MG tablet   3. Chronic bronchitis, unspecified chronic bronchitis type (H) J42    4. Type 2 diabetes mellitus, uncontrolled, with neuropathy (H) E11.40 sitagliptin (JANUVIA) 100 MG tablet    E11.65    5. Hypertension goal BP (blood pressure) < 140/90 I10    6. Hyperlipidemia LDL goal <100 E78.5    7. Major depressive disorder with single episode, in partial remission (H) F32.4    8. Gastroesophageal reflux disease without esophagitis K21.9    9. Benign prostatic hyperplasia with urinary frequency N40.1     R35.0    10. Moderate major depression (H) F32.1    11. PUD (peptic ulcer disease) K27.9    12. Gastroesophageal reflux disease with esophagitis K21.0    13. Essential hypertension with goal blood pressure less than 140/90 I10 losartan-hydrochlorothiazide (HYZAAR) 100-12.5 MG per tablet   14. Slow transit constipation K59.01    15. Chronic radicular low back pain M54.16     G89.29    16. Pain in joint, pelvic region and thigh, unspecified laterality M25.559    17. Cough R05 blood glucose monitoring (NO BRAND SPECIFIED) test strip   18. Benign  non-nodular prostatic hyperplasia with lower urinary tract symptoms N40.1 tamsulosin (FLOMAX) 0.4 MG capsule   19. Pruritic disorder L29.9 cetirizine (ZYRTEC) 10 MG tablet   20. Major depressive disorder, single episode, in partial remission (H) F32.4 FLUoxetine (PROZAC) 20 MG capsule   21. Type 2 diabetes mellitus with diabetic polyneuropathy, without long-term current use of insulin (H) E11.42 glimepiride (AMARYL) 4 MG tablet   22. Disturbance of skin sensation R20.9 gabapentin (NEURONTIN) 100 MG capsule        .    Side effects benefits and risks thoroughly discussed. .he may come in early if unimproved or getting worse          Importance of adhering to regimen discussed and if medications were dispensed, the importance of taking medications discussed and bringing in the medications after every visit for chronic problems         Please drink 2 glasses of water prior to meals and walk 15-30 minutes after meals    I spent 25 MINUTES SPENT  with patient discussing the following issues   The primary encounter diagnosis was Paranoid schizophrenia (H). Diagnoses of H/O: stroke with residual effects LEFT ARM AND LEG, Chronic bronchitis, unspecified chronic bronchitis type (H), Type 2 diabetes mellitus, uncontrolled, with neuropathy (H), Hypertension goal BP (blood pressure) < 140/90, Hyperlipidemia LDL goal <100, Major depressive disorder with single episode, in partial remission (H), Gastroesophageal reflux disease without esophagitis, Benign prostatic hyperplasia with urinary frequency, Moderate major depression (H), PUD (peptic ulcer disease), Gastroesophageal reflux disease with esophagitis, Essential hypertension with goal blood pressure less than 140/90, Slow transit constipation, Chronic radicular low back pain, Pain in joint, pelvic region and thigh, unspecified laterality, Cough, Benign non-nodular prostatic hyperplasia with lower urinary tract symptoms, Pruritic disorder, Major depressive disorder, single  episode, in partial remission (H), Type 2 diabetes mellitus with diabetic polyneuropathy, without long-term current use of insulin (H), and Disturbance of skin sensation were also pertinent to this visit. over half of which involved counseling and coordination of care.    Patient Instructions     (F20.0) Paranoid schizophrenia (H)  (primary encounter diagnosis)  Comment:    Plan:     (I69.30) H/O: stroke with residual effects LEFT ARM AND LEG  Comment:    Plan:      (J42) Chronic bronchitis, unspecified chronic bronchitis type (H)  Comment:    Plan:      (E11.40,  E11.65) Type 2 diabetes mellitus, uncontrolled, with neuropathy (H)  Comment:    Plan: sitagliptin (JANUVIA) 100 MG tablet             (I10) Hypertension goal BP (blood pressure) < 140/90  Comment:    Plan:      (E78.5) Hyperlipidemia LDL goal <100  Comment:    Plan:      (F32.4) Major depressive disorder with single episode, in partial remission (H)  Comment:    Plan:      (K21.9) Gastroesophageal reflux disease without esophagitis  Comment:    Plan:      (N40.1,  R35.0) Benign prostatic hyperplasia with urinary frequency  Comment:    Plan:      (F32.1) Moderate major depression (H)  Comment:    Plan:      (K27.9) PUD (peptic ulcer disease)  Comment:    Plan:      (K21.0) Gastroesophageal reflux disease with esophagitis  Comment:    Plan:      (I10) Essential hypertension with goal blood pressure less than 140/90  Comment:    Plan: losartan-hydrochlorothiazide (HYZAAR) 100-12.5         MG per tablet             (K59.01) Slow transit constipation  Comment:    Plan:      (M54.16,  G89.29) Chronic radicular low back pain  Comment:    Plan:      (M25.559) Pain in joint, pelvic region and thigh, unspecified laterality  Comment:    Plan:      (R05) Cough  Comment:    Plan: blood glucose monitoring (NO BRAND SPECIFIED)         test strip             (N40.1) Benign non-nodular prostatic hyperplasia with lower urinary tract symptoms  Comment:    Plan: tamsulosin  (FLOMAX) 0.4 MG capsule             (L29.9) Pruritic disorder  Comment:    Plan: cetirizine (ZYRTEC) 10 MG tablet             (F32.4) Major depressive disorder, single episode, in partial remission (H)  Comment:     Plan: FLUoxetine (PROZAC) 20 MG capsule             (E11.42) Type 2 diabetes mellitus with diabetic polyneuropathy, without long-term current use of insulin (H)  Comment:    Plan: glimepiride (AMARYL) 4 MG tablet            Patient Active Problem List   Diagnosis     Latent tuberculosis infection     PUD (peptic ulcer disease)     Moderate major depression (H)     CARDIOVASCULAR SCREENING; LDL GOAL LESS THAN 100     Unspecified hyperplasia of prostate with urinary obstruction and other lower urinary tract symptoms (LUTS)     Carpal tunnel syndrome     Disturbance of skin sensation     Vitamin D deficiency     Paranoid schizophrenia (H)     Nocturia     Schizophrenia (H)     Hallucinations     Positive PPD     Essential hypertension, benign     Hyperlipidemia     Tobacco use disorder     Diabetes mellitus, type 2 (H)     Gastroesophageal reflux disease without esophagitis     Health Care Home     Major depression in partial remission (H)     Hyperlipidemia LDL goal <100     Type 2 diabetes mellitus, uncontrolled, with neuropathy (H)     Hypertension goal BP (blood pressure) < 140/90     COPD (chronic obstructive pulmonary disease) (H)     Pain in joint, pelvic region and thigh     Tooth pain     Enlarged prostate with lower urinary tract symptoms (LUTS)     H/O: stroke with residual effects LEFT ARM AND LEG     Current Outpatient Prescriptions   Medication     losartan-hydrochlorothiazide (HYZAAR) 100-12.5 MG per tablet     sitagliptin (JANUVIA) 100 MG tablet     blood glucose monitoring (NO BRAND SPECIFIED) test strip     tamsulosin (FLOMAX) 0.4 MG capsule     cetirizine (ZYRTEC) 10 MG tablet     FLUoxetine (PROZAC) 20 MG capsule     glimepiride (AMARYL) 4 MG tablet     clopidogrel (PLAVIX) 75 MG tablet      lisinopril (PRINIVIL/ZESTRIL) 20 MG tablet     atorvastatin (LIPITOR) 20 MG tablet     pantoprazole (PROTONIX) 40 MG EC tablet     metoprolol (TOPROL-XL) 25 MG 24 hr tablet     atorvastatin (LIPITOR) 20 MG tablet     clopidogrel (PLAVIX) 75 MG tablet     blood glucose calibration (ONETOUCH ULTRA CONTROL) solution     diclofenac (VOLTAREN) 1 % GEL topical gel     [DISCONTINUED] metoprolol (TOPROL-XL) 25 MG 24 hr tablet     [DISCONTINUED] lisinopril (PRINIVIL/ZESTRIL) 20 MG tablet     [DISCONTINUED] glimepiride (AMARYL) 4 MG tablet     [DISCONTINUED] FLUoxetine (PROZAC) 20 MG capsule     [DISCONTINUED] JANUVIA 100 MG tablet     Sodium Fluoride (SF 5000 PLUS) 1.1 % CREA     Sodium Fluoride (SF 5000 PLUS) 1.1 % CREA     Ipratropium-Albuterol (COMBIVENT RESPIMAT)  MCG/ACT inhaler     [DISCONTINUED] losartan-hydrochlorothiazide (HYZAAR) 100-12.5 MG per tablet     No current facility-administered medications for this visit.                  ALL THE ABOVE PROBLEMS ARE STABLE AND MED CHANGES AS NOTED    Diet:  MEDITERRANEAN DIET AND DIABETES     Exercise:  AEROBIC   Exercises Range of motion, balance, isometric, and strengthening exercises 30 repetitions twice daily of involved joints      .MONET ALLEN MD 11/17/2017 7:04 PM  November 17, 2017

## 2017-11-17 NOTE — NURSING NOTE
"Chief Complaint   Patient presents with     Lab Result Notice     Recheck Medication       Initial /62  Pulse 78  Temp 97  F (36.1  C) (Tympanic)  Resp 16  Wt 150 lb (68 kg)  SpO2 99%  BMI 21.83 kg/m2 Estimated body mass index is 21.83 kg/(m^2) as calculated from the following:    Height as of 10/24/17: 5' 9.5\" (1.765 m).    Weight as of this encounter: 150 lb (68 kg).  Medication Reconciliation: complete   Drea Moses CMA    "

## 2017-11-24 ENCOUNTER — OFFICE VISIT (OUTPATIENT)
Dept: FAMILY MEDICINE | Facility: CLINIC | Age: 47
End: 2017-11-24
Payer: COMMERCIAL

## 2017-11-24 VITALS
OXYGEN SATURATION: 98 % | BODY MASS INDEX: 21.69 KG/M2 | SYSTOLIC BLOOD PRESSURE: 124 MMHG | TEMPERATURE: 97.8 F | DIASTOLIC BLOOD PRESSURE: 82 MMHG | WEIGHT: 149 LBS | HEART RATE: 91 BPM | RESPIRATION RATE: 16 BRPM

## 2017-11-24 DIAGNOSIS — I69.30 LATE EFFECT OF STROKE: Primary | ICD-10-CM

## 2017-11-24 DIAGNOSIS — E11.42 TYPE 2 DIABETES MELLITUS WITH DIABETIC POLYNEUROPATHY, WITHOUT LONG-TERM CURRENT USE OF INSULIN (H): ICD-10-CM

## 2017-11-24 DIAGNOSIS — I69.30 H/O: STROKE WITH RESIDUAL EFFECTS: Chronic | ICD-10-CM

## 2017-11-24 PROCEDURE — 99214 OFFICE O/P EST MOD 30 MIN: CPT | Performed by: FAMILY MEDICINE

## 2017-11-24 RX ORDER — GLIMEPIRIDE 4 MG/1
4 TABLET ORAL
Qty: 60 TABLET | Refills: 11 | Status: SHIPPED | OUTPATIENT
Start: 2017-11-24 | End: 2018-04-03

## 2017-11-24 NOTE — PROGRESS NOTES
SUBJECTIVE:   Vikram Rodgers is a 47 year old male who presents to clinic today for the following health issues:      Hypertension Follow-up      Outpatient blood pressures are being checked at home.  Results are 165/104.    Low Salt Diet: not monitoring salt        Amount of exercise or physical activity: 6-7 days/week for an average of 30-45 minutes    Problems taking medications regularly: No    Medication side effects: none    Diet: regular (no restrictions)        Diabetes Follow-up    Patient is checking blood sugars: 1-2 times daily.    Blood sugar testing frequency justification:   Results are as follows:       am - 200+        Diabetic concerns: None and blood sugar frequently over 200     Symptoms of hypoglycemia (low blood sugar): none     Paresthesias (numbness or burning in feet) or sores: Yes    Date of last diabetic eye exam: na        .  Current Outpatient Prescriptions   Medication Sig Dispense Refill     glimepiride (AMARYL) 4 MG tablet Take 1 tablet (4 mg) by mouth two times daily 60 tablet 11     losartan-hydrochlorothiazide (HYZAAR) 100-12.5 MG per tablet Take 1 tablet by mouth daily 30 tablet 11     sitagliptin (JANUVIA) 100 MG tablet TAKE 1 TABLET (100 MG) BY MOUTH DAILY 90 tablet 0     blood glucose monitoring (NO BRAND SPECIFIED) test strip 1 strip by In Vitro route daily 100 each 11     tamsulosin (FLOMAX) 0.4 MG capsule Take 1 capsule (0.4 mg) by mouth daily 30 capsule 11     cetirizine (ZYRTEC) 10 MG tablet Take 1 tablet (10 mg) by mouth daily 30 tablet 11     FLUoxetine (PROZAC) 20 MG capsule Take 1 capsule (20 mg) by mouth daily 30 capsule 11     clopidogrel (PLAVIX) 75 MG tablet Take 1 tablet (75 mg) by mouth daily 30 tablet 11     gabapentin (NEURONTIN) 100 MG capsule Take 1 capsule (100 mg) by mouth 3 times daily 90 capsule 1     lisinopril (PRINIVIL/ZESTRIL) 20 MG tablet Take 1 tablet (20 mg) by mouth daily 90 tablet 0     atorvastatin (LIPITOR) 20 MG tablet Take 1 tablet (20  mg) by mouth daily 90 tablet 0     pantoprazole (PROTONIX) 40 MG EC tablet TAKE 1 TABLET (40 MG) BY MOUTH DAILY, 30-60 MINUTES BEFORE A MEAL. 30 tablet 10     metoprolol (TOPROL-XL) 25 MG 24 hr tablet Take 1 tablet (25 mg) by mouth daily 30 tablet 0     atorvastatin (LIPITOR) 20 MG tablet Take 1 tablet (20 mg) by mouth daily 90 tablet 0     clopidogrel (PLAVIX) 75 MG tablet Take 1 tablet (75 mg) by mouth daily 39 tablet 0     blood glucose calibration (ONETOUCH ULTRA CONTROL) solution Use to calibrate blood glucose monitor as directed. 1 Bottle 11     diclofenac (VOLTAREN) 1 % GEL topical gel APPLY 4 GRAMS TO KNEES OR 2 GRAMS TO HANDS BY TOPICAL ROUTE FOUR TIMES DAILY USING ENCLOSED DOSING CARD 100 g 0        No Known Allergies    Immunization History   Administered Date(s) Administered     Influenza (IIV3) PF 10/04/2010, 2013     Influenza Vaccine IM 3yrs+ 4 Valent IIV4 10/06/2014     Pneumococcal 23 valent 2013     TD (ADULT, 7+) 2010         reports that he does not drink alcohol.      reports that he does not use illicit drugs.    family history includes Asthma in his father; C.A.D. in his father; DIABETES in his father; Hypertension in his father; Neurologic Disorder in his brother; Unknown/Adopted in his mother. There is no history of Cancer - colorectal.    indicated that the status of his no family hx of is unknown. He indicated that the status of his mother is unknown. He indicated that his father is . He indicated that the status of his brother is unknown.      has a past surgical history that includes ENT surgery.     reports that he does not currently engage in sexual activity.  .  Pediatric History   Patient Guardian Status     Not on file.     Other Topics Concern     Parent/Sibling W/ Cabg, Mi Or Angioplasty Before 65f 55m? No     unknown     Social History Narrative         reports that he quit smoking about 9 months ago. His smoking use included Cigarettes. He smoked 0.25  packs per day. He has never used smokeless tobacco.    Medical, social, surgical, and family histories reviewed.    Labs reviewed in EPIC  Patient Active Problem List   Diagnosis     Latent tuberculosis infection     PUD (peptic ulcer disease)     Moderate major depression (H)     CARDIOVASCULAR SCREENING; LDL GOAL LESS THAN 100     Unspecified hyperplasia of prostate with urinary obstruction and other lower urinary tract symptoms (LUTS)     Carpal tunnel syndrome     Disturbance of skin sensation     Vitamin D deficiency     Paranoid schizophrenia (H)     Nocturia     Schizophrenia (H)     Hallucinations     Positive PPD     Essential hypertension, benign     Hyperlipidemia     Tobacco use disorder     Diabetes mellitus, type 2 (H)     Gastroesophageal reflux disease without esophagitis     Health Care Home     Major depression in partial remission (H)     Hyperlipidemia LDL goal <100     Type 2 diabetes mellitus, uncontrolled, with neuropathy (H)     Hypertension goal BP (blood pressure) < 140/90     COPD (chronic obstructive pulmonary disease) (H)     Pain in joint, pelvic region and thigh     Tooth pain     Enlarged prostate with lower urinary tract symptoms (LUTS)     H/O: stroke with residual effects LEFT ARM AND LEG     Past Surgical History:   Procedure Laterality Date     ENT SURGERY      sinuses       Social History   Substance Use Topics     Smoking status: Former Smoker     Packs/day: 0.25     Types: Cigarettes     Quit date: 2/16/2017     Smokeless tobacco: Never Used     Alcohol use No     Family History   Problem Relation Age of Onset     Asthma Father      DIABETES Father      Hypertension Father      C.A.D. Father      Neurologic Disorder Brother      Unknown/Adopted Mother      Cancer - colorectal No family hx of          No Known Allergies  Recent Labs   Lab Test  10/24/17   1637  09/22/16   1637  02/15/16   1655  10/06/14   0833   04/08/13   1232   A1C  11.4*  9.0*   --   8.9*   < >  11.3*   LDL   172*  145*  168*  171*   < >  153*   HDL  41  39*  34*  36*   --   32*   TRIG  170*  264*  178*  134   --   338*   ALT  50  44  39   --    < >  44   CR  0.60*  0.85  0.70   --    < >  0.80   GFRESTIMATED  >90  >90  >90   --    < >  >90   GFRESTBLACK  >90  >90  >90   --    < >  >90   POTASSIUM  4.2  4.8  4.3   --    < >  4.2   TSH   --    --   1.01  1.01   --    --   1.87    < > = values in this interval not displayed.        BP Readings from Last 6 Encounters:   11/24/17 124/82   11/17/17 108/62   10/24/17 104/64   09/22/16 126/66   06/28/16 132/70   02/15/16 126/64       Wt Readings from Last 3 Encounters:   11/24/17 149 lb (67.6 kg)   11/17/17 150 lb (68 kg)   10/24/17 149 lb (67.6 kg)         Positive symptoms or findings indicated by bold designation:     ROS: 10 point ROS neg other than the symptoms noted above in the HPI.except  has Latent tuberculosis infection; PUD (peptic ulcer disease); Moderate major depression (H); CARDIOVASCULAR SCREENING; LDL GOAL LESS THAN 100; Unspecified hyperplasia of prostate with urinary obstruction and other lower urinary tract symptoms (LUTS); Carpal tunnel syndrome; Disturbance of skin sensation; Vitamin D deficiency; Paranoid schizophrenia (H); Nocturia; Schizophrenia (H); Hallucinations; Positive PPD; Essential hypertension, benign; Hyperlipidemia; Tobacco use disorder; Diabetes mellitus, type 2 (H); Gastroesophageal reflux disease without esophagitis; Health Care Home; Major depression in partial remission (H); Hyperlipidemia LDL goal <100; Type 2 diabetes mellitus, uncontrolled, with neuropathy (H); Hypertension goal BP (blood pressure) < 140/90; COPD (chronic obstructive pulmonary disease) (H); Pain in joint, pelvic region and thigh; Tooth pain; Enlarged prostate with lower urinary tract symptoms (LUTS); and H/O: stroke with residual effects LEFT ARM AND LEG on his problem list.   Constitutional: The patient denied fatigue, fever, insomnia, night sweats, recent illness  and weight loss.  LEFT SIDED STROKE     Eyes: The patient denied blindness, eye pain, eye tearing, photophobia, vision change and visual disturbance. OK        Ears/Nose/Throat/Neck: The patient denied dizziness, facial pain, hearing loss, nasal discharge, oral pain, otalgia, postnasal drip, sinus congestion, sore throat, tinnitus and voice change.   NORMAL     Cardiovascular: The patient denied arrhythmia, chest pain/pressure, claudication, edema, exercise intolerance, fatigue, orthopnea, palpitations and syncope.      Respiratory: The patient denied asthma, chest congestion, cough, dyspnea on exertion, dyspnea/shortness of breath, hemoptysis, pedal edema, pleuritic pain, productive sputum, snoring and wheezing. SMOKER     Gastrointestinal: The patient denied abdominal pain, anorexia, constipation, diarrhea, dysphagia, gastroesophageal reflux, hematochezia, hemorrhoids, melena, nausea and vomiting . GASTROESOPHAGEAL REFLUX DISEASE WITHOUT ESOPHAGITIS     Genitourinary/Nephrology: The patient denied breast complaint, dysuria, nocturia sexual dysfunction, t, urinary frequency, urinary incontinence, urinary urgency        Musculoskeletal: The patient denied arthralgia(s), back pain, joint complaint, muscle weakness, myalgias, osteoporosis, sciatica, stiffness and swelling.  LEFT SIDED STROKE     Dermatoligic:: The patient denied acne, dermatitis, ecchymosis, itching, mole change, rash, skin cancer, skin lesion and sores.      Neurologic: The patient denied dizziness, gait abnormality, headache, memory loss, mental status change, paresis, paresthesia, seizure, syncope, tremor and vision change. LEFT SIDED ARM AND LEFT HEMIPLEGIA     Psychiatric: The patient denied anxiety, depression, disturbances of memory, drug abuse, insomnia, mood swings and relationship difficulties.  PARANOID SCHIZOPHRENIA     Endocrine: The patient denied , goiter, obesity, polyuria and thyroid disease.      Hematologic/Lymphatic: The patient  denied abnormal bleeding and bruising, abnormal ecchymoses, anemia, lymph node enlargement/mass, petechiae and venous  Thrombosis.      Allergy/Immunology: The patient denied food allergy and  Allergic rhinitis or conjunctivitis.        PE:  /82  Pulse 91  Temp 97.8  F (36.6  C) (Tympanic)  Resp 16  Wt 149 lb (67.6 kg)  SpO2 98%  BMI 21.69 kg/m2 Body mass index is 21.69 kg/(m^2).    Constitutional: general appearance, well nourished, well developed, in no acute distress, well developed, appears stated age, normal body habitus,  NORMAL     Eyes:; The patient has normal eyelids sclerae and conjunctivae : NORMAL      Ears/Nose/Throat: external ear, overall: normal appearance; external nose, overall: benign appearance, normal moujth gums and lips  The patient has: NORMAL     Neck: thyroid, overall: normal size, normal consistency, nontender,  NORMAL     Respiratory:  palpation of chest, overall: normal excursion, NORMAL   Clear to percussion and auscultation  NORMAL     Tachypnea  NORMAL  Color   NORMAL     Cardiovascular:  Good color with no peripheral edema  NORMAL   Regular sinus rhythm without murmur. Physiologic heart sounds Heart is unelarged  .   Chest/Breast: normal shape  NORMAL      Abdominal exam,  Liver and spleen are  unenlarged  NORMAL       Tenderness      Scars  NORMAL     Urogenital; no renal, flank or bladder  tenderness;  NORMAL     Lymphatic: neck nodes,  NORMAL    Other nodes   NOT APPLICABLE     Musculoskeletal:  Brief ortho exam normal except:  LEFT ARM AND LEG WEAKNESS AND DYSFUNCTION     Integument: inspection of skin, no rash, lesions; and, palpation, no induration, no tenderness.      Neurologic mental status, overall: alert and oriented; gait, no ataxia, no unsteadiness; coordination, no tremors; cranial nerves, overall: normal motor, overall: normal bulk, tone.      Psychiatric: orientation/consciousness, overall: oriented to person, place and time; behavior/psychomotor activity,  no tics, normal psychomotor activity; mood and affect, overall: normal mood and affect; appearance, overall: well-groomed, good eye contact; speech, overall: normal quality, no aphasia and normal quality, quantity, intact.      Diagnostic Test Results:  Results for orders placed or performed in visit on 10/24/17   Basic metabolic panel   Result Value Ref Range    Sodium 136 133 - 144 mmol/L    Potassium 4.2 3.4 - 5.3 mmol/L    Chloride 102 94 - 109 mmol/L    Carbon Dioxide 22 20 - 32 mmol/L    Anion Gap 12 3 - 14 mmol/L    Glucose 371 (H) 70 - 99 mg/dL    Urea Nitrogen 15 7 - 30 mg/dL    Creatinine 0.60 (L) 0.66 - 1.25 mg/dL    GFR Estimate >90 >60 mL/min/1.7m2    GFR Estimate If Black >90 >60 mL/min/1.7m2    Calcium 9.3 8.5 - 10.1 mg/dL   Lipid panel reflex to direct LDL Fasting   Result Value Ref Range    Cholesterol 247 (H) <200 mg/dL    Triglycerides 170 (H) <150 mg/dL    HDL Cholesterol 41 >39 mg/dL    LDL Cholesterol Calculated 172 (H) <100 mg/dL    Non HDL Cholesterol 206 (H) <130 mg/dL   Albumin Random Urine Quantitative with Creat Ratio   Result Value Ref Range    Creatinine Urine 92 mg/dL    Albumin Urine mg/L 7 mg/L    Albumin Urine mg/g Cr 7.69 0 - 17 mg/g Cr   Hemoglobin A1c   Result Value Ref Range    Hemoglobin A1C 11.4 (H) 4.3 - 6.0 %   ALT   Result Value Ref Range    ALT 50 0 - 70 U/L         ICD-10-CM    1. Late effect of stroke I69.30 NEUROLOGY ADULT REFERRAL   2. Type 2 diabetes mellitus with diabetic polyneuropathy, without long-term current use of insulin (H) E11.42 glimepiride (AMARYL) 4 MG tablet     NEUROLOGY ADULT REFERRAL   3. H/O: stroke with residual effects LEFT ARM AND LEG I69.30 NEUROLOGY ADULT REFERRAL        .    Side effects benefits and risks thoroughly discussed. .he may come in early if unimproved or getting worse          Importance of adhering to regimen discussed and if medications were dispensed, the importance of taking medications discussed and bringing in the medications  after every visit for chronic problems         Please drink 2 glasses of water prior to meals and walk 15-30 minutes after meals    I spent 25 MINUTES SPENT  with patient discussing the following issues   The primary encounter diagnosis was Late effect of stroke. Diagnoses of Type 2 diabetes mellitus with diabetic polyneuropathy, without long-term current use of insulin (H) and H/O: stroke with residual effects LEFT ARM AND LEG were also pertinent to this visit. over half of which involved counseling and coordination of care.    Patient Instructions     Increase amaryl 4mg po twice daily     Hold of blood sugar is less than 100    If FASTING BLOOD SUGAR IS LOW     HOLD GABABENTIN 100MG PO THREE TIMES DAILY  MAXWELL ALLEN JR., MD     (I69.30) Late effect of stroke  (primary encounter diagnosis)    Comment:      Plan: NEUROLOGY ADULT REFERRAL                   (E11.42) Type 2 diabetes mellitus with diabetic polyneuropathy, without long-term current use of insulin (H)    Comment:      Plan: glimepiride (AMARYL) 4 MG tablet, NEUROLOGY           ADULT REFERRAL                   (I69.30) H/O: stroke with residual effects LEFT ARM AND LEG    Comment:      Plan: NEUROLOGY ADULT REFERRAL                          GLIMIPERIDE 4MG PO TWICE DAILY       ALL THE ABOVE PROBLEMS ARE STABLE AND MED CHANGES AS NOTED    Diet: MEDITERRANEAN DIET AND DIABETES     Exercise:  25 MINUTES SPENT   Exercises Range of motion, balance, isometric, and strengthening exercises 30 repetitions twice daily of involved joints      .MONET ALLEN MD 11/24/2017 4:45 PM  November 24, 2017

## 2017-11-24 NOTE — PATIENT INSTRUCTIONS
Increase amaryl 4mg po twice daily     Hold of blood sugar is less than 100    If FASTING BLOOD SUGAR IS LOW     HOLD GABABENTIN 100MG PO THREE TIMES DAILY  MAXWELL ALLEN JR., MD     (I69.30) Late effect of stroke  (primary encounter diagnosis)    Comment:      Plan: NEUROLOGY ADULT REFERRAL                   (E11.42) Type 2 diabetes mellitus with diabetic polyneuropathy, without long-term current use of insulin (H)    Comment:      Plan: glimepiride (AMARYL) 4 MG tablet, NEUROLOGY           ADULT REFERRAL                   (I69.30) H/O: stroke with residual effects LEFT ARM AND LEG    Comment:      Plan: NEUROLOGY ADULT REFERRAL

## 2017-11-24 NOTE — NURSING NOTE
"Chief Complaint   Patient presents with     Recheck Medication       Initial /82  Pulse 91  Temp 97.8  F (36.6  C) (Tympanic)  Resp 16  Wt 149 lb (67.6 kg)  SpO2 98%  BMI 21.69 kg/m2 Estimated body mass index is 21.69 kg/(m^2) as calculated from the following:    Height as of 10/24/17: 5' 9.5\" (1.765 m).    Weight as of this encounter: 149 lb (67.6 kg).  Medication Reconciliation: complete   .Mana EVANS      "

## 2017-11-24 NOTE — MR AVS SNAPSHOT
After Visit Summary   11/24/2017    Vikram Rodgers    MRN: [id number]           Patient Information     Date Of Birth          1970        Visit Information        Provider Department      11/24/2017 4:15 PM Hamlet Tavares MD; [employer] [facility]        Today's Diagnoses     Late effect of stroke    -  1    Type 2 diabetes mellitus with diabetic polyneuropathy, without long-term current use of insulin (H)        H/O: stroke with residual effects LEFT ARM AND LEG          Care Instructions      Increase amaryl 4mg po twice daily     Hold of blood sugar is less than 100    If FASTING BLOOD SUGAR IS LOW     HOLD GABABENTIN 100MG PO THREE TIMES DAILY  MAXWELL TAVARES JR., MD     (I69.30) Late effect of stroke  (primary encounter diagnosis)    Comment:      Plan: NEUROLOGY ADULT REFERRAL                   (E11.42) Type 2 diabetes mellitus with diabetic polyneuropathy, without long-term current use of insulin (H)    Comment:      Plan: glimepiride (AMARYL) 4 MG tablet, NEUROLOGY           ADULT REFERRAL                   (I69.30) H/O: stroke with residual effects LEFT ARM AND LEG    Comment:      Plan: NEUROLOGY ADULT REFERRAL                                  Follow-ups after your visit        Additional Services     NEUROLOGY ADULT REFERRAL       Your provider has referred you for the following:   Consult at [facility]: Neurology Clinic [facility] [phone]   [email or web address]  Stroke/Endovascular  Patient Active Problem List:     Latent tuberculosis infection     PUD (peptic ulcer disease)     Moderate major depression (H)     CARDIOVASCULAR SCREENING; LDL GOAL LESS THAN 100     Unspecified hyperplasia of prostate with urinary obstruction and other lower urinary tract symptoms (LUTS)     Carpal tunnel syndrome     Disturbance of skin sensation     Vitamin D deficiency     Paranoid  schizophrenia (H)     Nocturia     Schizophrenia (H)     Hallucinations     Positive PPD     Essential hypertension, benign     Hyperlipidemia     Tobacco use disorder     Diabetes mellitus, type 2 (H)     Gastroesophageal reflux disease without esophagitis     Health Care Home     Major depression in partial remission (H)     Hyperlipidemia LDL goal <100     Type 2 diabetes mellitus, uncontrolled, with neuropathy (H)     Hypertension goal BP (blood pressure) < 140/90     COPD (chronic obstructive pulmonary disease) (H)     Pain in joint, pelvic region and thigh     Tooth pain     Enlarged prostate with lower urinary tract symptoms (LUTS)     H/O: stroke with residual effects LEFT ARM AND LEG    Current Outpatient Prescriptions:  glimepiride (AMARYL) 4 MG tablet  losartan-hydrochlorothiazide (HYZAAR) 100-12.5 MG per tablet  sitagliptin (JANUVIA) 100 MG tablet  blood glucose monitoring (NO BRAND SPECIFIED) test strip  tamsulosin (FLOMAX) 0.4 MG capsule  cetirizine (ZYRTEC) 10 MG tablet  FLUoxetine (PROZAC) 20 MG capsule  clopidogrel (PLAVIX) 75 MG tablet  gabapentin (NEURONTIN) 100 MG capsule  lisinopril (PRINIVIL/ZESTRIL) 20 MG tablet  atorvastatin (LIPITOR) 20 MG tablet  pantoprazole (PROTONIX) 40 MG EC tablet  metoprolol (TOPROL-XL) 25 MG 24 hr tablet  atorvastatin (LIPITOR) 20 MG tablet  clopidogrel (PLAVIX) 75 MG tablet  blood glucose calibration (ONETOUCH ULTRA CONTROL) solution  diclofenac (VOLTAREN) 1 % GEL topical gel  [DISCONTINUED] glimepiride (AMARYL) 4 MG tablet    No current facility-administered medications for this visit.         Please be aware that coverage of these services is subject to the terms and limitations of your health insurance plan.  Call member services at your health plan with any benefit or coverage questions.      Please bring the following with you to your appointment:    (1) Any X-Rays, CTs or MRIs which have been performed.  Contact the facility where they were done to arrange for  " prior to your scheduled appointment.    (2) List of current medications  (3) This referral request   (4) Any documents/labs given to you for this referral                  Follow-up notes from your care team     Return in about 1 week (around 2017).      Who to contact     If you have questions or need follow up information about today's clinic visit or your schedule please contact [facility] directly at [phone].  Normal or non-critical lab and imaging results will be communicated to you by [employer], letter or phone within 4 business days after the clinic has received the results. If you do not hear from us within 7 days, please contact the clinic through [employer] or phone. If you have a critical or abnormal lab result, we will notify you by phone as soon as possible.  Submit refill requests through [email or web address] or call your pharmacy and they will forward the refill request to us. Please allow 3 business days for your refill to be completed.          Additional Information About Your Visit        [employer] Information     [email or web address] lets you send messages to your doctor, view your test results, renew your prescriptions, schedule appointments and more. To sign up, go to [email or web address] . Click on \"Log in\" on the left side of the screen, which will take you to the Welcome page. Then click on \"Sign up Now\" on the right side of the page.     You will be asked to enter the access code listed below, as well as some personal information. Please follow the directions to create your username and password.     Your access code is: [email or web address]  Expires: 2018  3:32 PM     Your access code will  in 90 days. If you need help or a new code, please call your [facility] clinic or [phone].        Care EveryWhere ID     This is your Care EveryWhere ID. This could be used by other organizations to access your [facility] medical records  [id number]        Your Vitals Were  "    Pulse Temperature Respirations Pulse Oximetry BMI (Body Mass Index)       91 97.8  F (36.6  C) (Tympanic) 16 98% 21.69 kg/m2        Blood Pressure from Last 3 Encounters:   11/24/17 124/82   11/17/17 108/62   10/24/17 104/64    Weight from Last 3 Encounters:   11/24/17 149 lb (67.6 kg)   11/17/17 150 lb (68 kg)   10/24/17 149 lb (67.6 kg)              We Performed the Following     NEUROLOGY ADULT REFERRAL          Today's Medication Changes          These changes are accurate as of: 11/24/17  4:58 PM.  If you have any questions, ask your nurse or doctor.               These medicines have changed or have updated prescriptions.        Dose/Directions    glimepiride 4 MG tablet   Commonly known as:  AMARYL   This may have changed:  when to take this   Used for:  Type 2 diabetes mellitus with diabetic polyneuropathy, without long-term current use of insulin (H)   Changed by:  Hamlet Tavares MD        Dose:  4 mg   Take 1 tablet (4 mg) by mouth two times daily   Quantity:  60 tablet   Refills:  11            Where to get your medicines      These medications were sent to [facility] [address] [place] [facility] [address] [facility] [address]  [address] [facility] [address]     Phone:  [phone]     glimepiride 4 MG tablet                Primary Care Provider Office Phone # Fax #    Hamlet Tavares [phone] [address] [facility] [address]        Equal Access to Services     MARISELA PICHARDO AH: Hadii aad ku hadasho Soubaldo, waaxda luqadaha, qaybta kaalmada adeegyada, waxay idiin hayaan adeyahir moore . So [facility] [phone].    ATENCIÓN: Si habla español, tiene a mathur disposición servicios gratuitos de asistencia lingüística. Llame al [phone].    We comply with applicable federal civil rights laws and [place] laws. We do not discriminate on the basis of race, color, national origin, age, disability, sex, sexual orientation, or gender identity.            Thank  you!     Thank you for choosing [facility]  for your care. Our goal is always to provide you with excellent care. Hearing back from our patients is one way we can continue to improve our services. Please take a few minutes to complete the written survey that you may receive in the mail after your visit with us. Thank you!             Your Updated Medication List - Protect others around you: Learn how to safely use, store and throw away your medicines at www.disposemymeds.org.          This list is accurate as of: 11/24/17  4:58 PM.  Always use your most recent med list.                   Brand Name Dispense Instructions for use Diagnosis    * atorvastatin 20 MG tablet    LIPITOR    90 tablet    Take 1 tablet (20 mg) by mouth daily    Hyperlipidemia LDL goal <100       * atorvastatin 20 MG tablet    LIPITOR    90 tablet    Take 1 tablet (20 mg) by mouth daily    Hyperlipidemia LDL goal <100       blood glucose calibration solution     1 Bottle    Use to calibrate blood glucose monitor as directed.    Type 2 diabetes mellitus with diabetic polyneuropathy, without long-term current use of insulin (H)       blood glucose monitoring test strip    no brand specified    100 each    1 strip by In Vitro route daily    Cough       cetirizine 10 MG tablet    zyrTEC    30 tablet    Take 1 tablet (10 mg) by mouth daily    Pruritic disorder       * clopidogrel 75 MG tablet    PLAVIX    39 tablet    Take 1 tablet (75 mg) by mouth daily    Hypertension goal BP (blood pressure) < 140/90       * clopidogrel 75 MG tablet    PLAVIX    30 tablet    Take 1 tablet (75 mg) by mouth daily    H/O: stroke with residual effects       diclofenac 1 % Gel topical gel    VOLTAREN    100 g    APPLY 4 GRAMS TO KNEES OR 2 GRAMS TO HANDS BY TOPICAL ROUTE FOUR TIMES DAILY USING ENCLOSED DOSING CARD    Pain in joint, pelvic region and thigh, unspecified laterality       FLUoxetine 20 MG capsule    PROzac    30 capsule     Take 1 capsule (20 mg) by mouth daily    Major depressive disorder, single episode, in partial remission (H)       gabapentin 100 MG capsule    NEURONTIN    90 capsule    Take 1 capsule (100 mg) by mouth 3 times daily    Disturbance of skin sensation       glimepiride 4 MG tablet    AMARYL    60 tablet    Take 1 tablet (4 mg) by mouth two times daily    Type 2 diabetes mellitus with diabetic polyneuropathy, without long-term current use of insulin (H)       lisinopril 20 MG tablet    PRINIVIL/ZESTRIL    90 tablet    Take 1 tablet (20 mg) by mouth daily    Hyperlipidemia LDL goal <100       losartan-hydrochlorothiazide 100-12.5 MG per tablet    HYZAAR    30 tablet    Take 1 tablet by mouth daily    Essential hypertension with goal blood pressure less than 140/90       metoprolol 25 MG 24 hr tablet    TOPROL-XL    30 tablet    Take 1 tablet (25 mg) by mouth daily    Hypertension goal BP (blood pressure) < 140/90       pantoprazole 40 MG EC tablet    PROTONIX    30 tablet    TAKE 1 TABLET (40 MG) BY MOUTH DAILY, 30-60 MINUTES BEFORE A MEAL.    Gastroesophageal reflux disease without esophagitis       sitagliptin 100 MG tablet    JANUVIA    90 tablet    TAKE 1 TABLET (100 MG) BY MOUTH DAILY    Type 2 diabetes mellitus, uncontrolled, with neuropathy (H)       tamsulosin 0.4 MG capsule    FLOMAX    30 capsule    Take 1 capsule (0.4 mg) by mouth daily    Benign non-nodular prostatic hyperplasia with lower urinary tract symptoms       * Notice:  This list has 4 medication(s) that are the same as other medications prescribed for you. Read the directions carefully, and ask your doctor or other care provider to review them with you.

## 2017-11-28 DIAGNOSIS — I10 HYPERTENSION GOAL BP (BLOOD PRESSURE) < 140/90: Chronic | ICD-10-CM

## 2017-11-29 RX ORDER — METOPROLOL SUCCINATE 25 MG/1
TABLET, EXTENDED RELEASE ORAL
Qty: 90 TABLET | Refills: 3 | Status: SHIPPED | OUTPATIENT
Start: 2017-11-29 | End: 2018-04-03

## 2017-11-29 NOTE — TELEPHONE ENCOUNTER
Last OV 11/24/2017.  Requested Prescriptions   Pending Prescriptions Disp Refills     metoprolol (TOPROL-XL) 25 MG 24 hr tablet [Pharmacy Med Name: METOPROLOL SUCC ER 25 MG TAB] 30 tablet 0     Sig: TAKE 1 TABLET (25 MG) BY MOUTH DAILY    Beta-Blockers Protocol Passed    11/28/2017  5:13 PM       Passed - Blood pressure under 140/90    BP Readings from Last 3 Encounters:   11/24/17 124/82   11/17/17 108/62   10/24/17 104/64                Passed - Patient is age 6 or older       Passed - Recent or future visit with authorizing provider's specialty    Patient had office visit in the last year or has a visit in the next 30 days with authorizing provider.  See chart review.

## 2017-12-31 DIAGNOSIS — K08.89 TOOTH PAIN: ICD-10-CM

## 2018-01-04 RX ORDER — SODIUM FLUORIDE 1.1 G/100G
CREAM ORAL
Qty: 51 G | Refills: 5 | Status: SHIPPED | OUTPATIENT
Start: 2018-01-04 | End: 2018-04-03

## 2018-01-04 NOTE — TELEPHONE ENCOUNTER
denta            Routing refill request to provider for review/approval because:  Drug not active on patient's medication list

## 2018-01-04 NOTE — TELEPHONE ENCOUNTER
APPT INFO    Date /Time: 1/15/18, 12:30pm   Reason for Appt: Diabetic Neuropathy    Ref Provider/Clinic: MONET ALLEN   Are there internal records? Yes/No?  IF YES, list clinic names: [facility]   Are there outside records? Yes/No? no   Patient Contact (Y/N) & Call Details: No, referred    Action:

## 2018-01-15 ENCOUNTER — PRE VISIT (OUTPATIENT)
Dept: NEUROLOGY | Facility: CLINIC | Age: 48
End: 2018-01-15

## 2018-01-23 ENCOUNTER — OFFICE VISIT (OUTPATIENT)
Dept: FAMILY MEDICINE | Facility: CLINIC | Age: 48
End: 2018-01-23
Payer: COMMERCIAL

## 2018-01-23 VITALS
BODY MASS INDEX: 22.56 KG/M2 | TEMPERATURE: 96.6 F | SYSTOLIC BLOOD PRESSURE: 112 MMHG | RESPIRATION RATE: 20 BRPM | HEART RATE: 79 BPM | DIASTOLIC BLOOD PRESSURE: 64 MMHG | WEIGHT: 155 LBS | OXYGEN SATURATION: 100 %

## 2018-01-23 DIAGNOSIS — R20.9 DISTURBANCE OF SKIN SENSATION: ICD-10-CM

## 2018-01-23 DIAGNOSIS — E78.5 HYPERLIPIDEMIA LDL GOAL <100: Chronic | ICD-10-CM

## 2018-01-23 DIAGNOSIS — I69.30 H/O: STROKE WITH RESIDUAL EFFECTS: Primary | Chronic | ICD-10-CM

## 2018-01-23 DIAGNOSIS — I10 HYPERTENSION GOAL BP (BLOOD PRESSURE) < 140/90: Chronic | ICD-10-CM

## 2018-01-23 DIAGNOSIS — F20.0 PARANOID SCHIZOPHRENIA (H): ICD-10-CM

## 2018-01-23 LAB — HBA1C MFR BLD: 10.1 % (ref 4.3–6)

## 2018-01-23 PROCEDURE — 36415 COLL VENOUS BLD VENIPUNCTURE: CPT | Performed by: FAMILY MEDICINE

## 2018-01-23 PROCEDURE — 83036 HEMOGLOBIN GLYCOSYLATED A1C: CPT | Performed by: FAMILY MEDICINE

## 2018-01-23 PROCEDURE — 82043 UR ALBUMIN QUANTITATIVE: CPT | Performed by: FAMILY MEDICINE

## 2018-01-23 PROCEDURE — 80048 BASIC METABOLIC PNL TOTAL CA: CPT | Performed by: FAMILY MEDICINE

## 2018-01-23 PROCEDURE — 99214 OFFICE O/P EST MOD 30 MIN: CPT | Performed by: FAMILY MEDICINE

## 2018-01-23 PROCEDURE — 83721 ASSAY OF BLOOD LIPOPROTEIN: CPT | Performed by: FAMILY MEDICINE

## 2018-01-23 RX ORDER — GABAPENTIN 300 MG/1
300 CAPSULE ORAL 3 TIMES DAILY
Qty: 90 CAPSULE | Refills: 11 | Status: SHIPPED | OUTPATIENT
Start: 2018-01-23 | End: 2018-02-16

## 2018-01-23 ASSESSMENT — PATIENT HEALTH QUESTIONNAIRE - PHQ9: SUM OF ALL RESPONSES TO PHQ QUESTIONS 1-9: 6

## 2018-01-23 NOTE — PATIENT INSTRUCTIONS
(I69.30) H/O: stroke with residual effects LEFT ARM AND LEG  (primary encounter diagnosis)  Comment:    Plan: NEUROLOGY ADULT REFERRAL, LDL cholesterol         direct             (I10) Hypertension goal BP (blood pressure) < 140/90  Comment:    Plan: CARE COORDINATION REFERRAL             (E78.5) Hyperlipidemia LDL goal <100  Comment:    Plan: CARE COORDINATION REFERRAL, LDL cholesterol         direct, Hemoglobin A1c, Basic metabolic panel,         Albumin Random Urine Quantitative with Creat         Ratio             (E11.40,  E11.65) Type 2 diabetes mellitus, uncontrolled, with neuropathy (H)  Comment:    Plan: CARE COORDINATION REFERRAL             (F20.0) Paranoid schizophrenia (H)  Comment:    Plan:      (R20.9) Disturbance of skin sensation  Comment:    Plan: gabapentin (NEURONTIN) 300 MG capsule,         NEUROLOGY ADULT REFERRAL

## 2018-01-23 NOTE — NURSING NOTE
"Chief Complaint   Patient presents with     Neurologic Problem     left side of body affected     Recheck Medication     needs refills and PCA hours       Initial /64  Pulse 79  Temp 96.6  F (35.9  C) (Tympanic)  Resp 20  Wt 155 lb (70.3 kg)  SpO2 100%  BMI 22.56 kg/m2 Estimated body mass index is 22.56 kg/(m^2) as calculated from the following:    Height as of 10/24/17: 5' 9.5\" (1.765 m).    Weight as of this encounter: 155 lb (70.3 kg).  Medication Reconciliation: complete   Drea Moses CMA    "

## 2018-01-23 NOTE — LETTER
January 23, 2018      Vikram Rodgers  [address]  [facility] [address]        Dear ,    We are writing to inform you of your test results.    Test results indicate you may require additional follow up, see comment below.    Resulted Orders   Hemoglobin A1c   Result Value Ref Range    Hemoglobin A1C 10.1 (H) 4.3 - 6.0 %     ABNORMAL THREE MONTH GLUCOSE AVERAGE   WE NEED TO IMPROVE YOUR DIABETES 2 GOAL 8% TREATMENT   TO FURTHER REDUCE YOUR RISK FOR STROKE OR MYOCARDIAL INFARCTION   PLEASE COME BACK IN SOON   If you have any questions or concerns, please call the clinic at the number listed above.       Sincerely,        MONET ALLEN MD

## 2018-01-23 NOTE — LETTER
"January 24, 2018      Vikram Rodgers  [address]  [facility] [address]        Dear ,    We are writing to inform you of your test results.      ABNORMAL LDL OR \"BAD\" CHOLESTEROL  64% TOO HIGH  ABNORMAL THREE MONTH GLUCOSE AVERAGE     A1C 5.0 AVERAGE GLUCOSE   90    A1C 6.0 AVERAGE GLUCOSE 120    A1C 6.5 AVERAGE GLUCOSE 135    A1C 6.8 AVERAGE GLUCOSE 144    A1C 7.0 AVERAGE GLUCOSE 150    A1C 8.0 AVERAGE GLUCOSE 180    A1C 9.0 AVERAGE GLUCOSE 200    A1C 10. AVERAGE GLUCOSE 230    A1C 11. AVERAGE GLUCOSE 269    COME BACK TO DISCUSS FURTHER TREATMENT  TO LOWER DIABETES RISK     NORMAL GLUCOSE, RENAL AND BLOOD SALTS      NORMAL DIABETES URINE PROTEIN TEST       Resulted Orders   LDL cholesterol direct   Result Value Ref Range    LDL Cholesterol Direct 164 (H) <100 mg/dL      Comment:      Above desirable:  100-129 mg/dl  Borderline High:  130-159 mg/dL  High:             160-189 mg/dL  Very high:       >189 mg/dl     Hemoglobin A1c   Result Value Ref Range    Hemoglobin A1C 10.1 (H) 4.3 - 6.0 %   Basic metabolic panel   Result Value Ref Range    Sodium 136 133 - 144 mmol/L    Potassium 3.9 3.4 - 5.3 mmol/L    Chloride 106 94 - 109 mmol/L    Carbon Dioxide 25 20 - 32 mmol/L    Anion Gap 5 3 - 14 mmol/L    Glucose 169 (H) 70 - 99 mg/dL      Comment:      Fasting specimen    Urea Nitrogen 11 7 - 30 mg/dL    Creatinine 0.60 (L) 0.66 - 1.25 mg/dL    GFR Estimate >90 >60 mL/min/1.7m2      Comment:      Non  GFR Calc    GFR Estimate If Black >90 >60 mL/min/1.7m2      Comment:       GFR Calc    Calcium 8.6 8.5 - 10.1 mg/dL   Albumin Random Urine Quantitative with Creat Ratio   Result Value Ref Range    Creatinine Urine 160 mg/dL    Albumin Urine mg/L 11 mg/L    Albumin Urine mg/g Cr 6.75 0 - 17 mg/g Cr       If you have any questions or concerns, please call the clinic at the number listed above.       Sincerely,        MONET ALLEN MD                "

## 2018-01-23 NOTE — MR AVS SNAPSHOT
After Visit Summary   1/23/2018    Vikram Rodgers    MRN: [id number]           Patient Information     Date Of Birth          1970        Visit Information        Provider Department      1/23/2018 1:45 PM Hamlet Tavares MD; [employer] [facility]        Today's Diagnoses     H/O: stroke with residual effects LEFT ARM AND LEG    -  1    Hypertension goal BP (blood pressure) < 140/90        Hyperlipidemia LDL goal <100        Type 2 diabetes mellitus, uncontrolled, with neuropathy (H)        Paranoid schizophrenia (H)        Disturbance of skin sensation          Care Instructions    (I69.30) H/O: stroke with residual effects LEFT ARM AND LEG  (primary encounter diagnosis)  Comment:    Plan: NEUROLOGY ADULT REFERRAL, LDL cholesterol         direct             (I10) Hypertension goal BP (blood pressure) < 140/90  Comment:    Plan: CARE COORDINATION REFERRAL             (E78.5) Hyperlipidemia LDL goal <100  Comment:    Plan: CARE COORDINATION REFERRAL, LDL cholesterol         direct, Hemoglobin A1c, Basic metabolic panel,         Albumin Random Urine Quantitative with Creat         Ratio             (E11.40,  E11.65) Type 2 diabetes mellitus, uncontrolled, with neuropathy (H)  Comment:    Plan: CARE COORDINATION REFERRAL             (F20.0) Paranoid schizophrenia (H)  Comment:    Plan:      (R20.9) Disturbance of skin sensation  Comment:    Plan: gabapentin (NEURONTIN) 300 MG capsule,         NEUROLOGY ADULT REFERRAL                            Follow-ups after your visit        Additional Services     CARE COORDINATION REFERRAL       Services are provided by a Care Coordinator for people with complex needs such as: medical, social, or financial troubles.  The Care Coordinator works with the patient and their Primary Care Provider to determine health goals, obtain resources, achieve outcomes, and develop care plans that help coordinate  the patient's care.     Reason for Referral: Mental Wellness (Health) (Mental Illness/Chemical Depedency):  Paranoid schizophrenic with stroke and left sided weakness requesting increase in personal care assitant hours  and    Patient Active Problem List:     Latent tuberculosis infection     PUD (peptic ulcer disease)     Moderate major depression (H)     CARDIOVASCULAR SCREENING; LDL GOAL LESS THAN 100     Unspecified hyperplasia of prostate with urinary obstruction and other lower urinary tract symptoms (LUTS)     Carpal tunnel syndrome     Disturbance of skin sensation     Vitamin D deficiency     Paranoid schizophrenia (H)     Nocturia     Schizophrenia (H)     Hallucinations     Positive PPD     Essential hypertension, benign     Hyperlipidemia     Tobacco use disorder     Diabetes mellitus, type 2 (H)     Gastroesophageal reflux disease without esophagitis     Health Care Home     Major depression in partial remission (H)     Hyperlipidemia LDL goal <100     Type 2 diabetes mellitus, uncontrolled, with neuropathy (H)     Hypertension goal BP (blood pressure) < 140/90     COPD (chronic obstructive pulmonary disease) (H)     Pain in joint, pelvic region and thigh     Tooth pain     Enlarged prostate with lower urinary tract symptoms (LUTS)     H/O: stroke with residual effects LEFT ARM AND LEG    Current Outpatient Prescriptions:  gabapentin (NEURONTIN) 300 MG capsule  DENTA 5000 PLUS 1.1 % CREA  metoprolol (TOPROL-XL) 25 MG 24 hr tablet  glimepiride (AMARYL) 4 MG tablet  losartan-hydrochlorothiazide (HYZAAR) 100-12.5 MG per tablet  sitagliptin (JANUVIA) 100 MG tablet  blood glucose monitoring (NO BRAND SPECIFIED) test strip  tamsulosin (FLOMAX) 0.4 MG capsule  cetirizine (ZYRTEC) 10 MG tablet  FLUoxetine (PROZAC) 20 MG capsule  clopidogrel (PLAVIX) 75 MG tablet  lisinopril (PRINIVIL/ZESTRIL) 20 MG tablet  atorvastatin (LIPITOR) 20 MG tablet  pantoprazole (PROTONIX) 40 MG EC tablet  blood glucose calibration  (ONETOUCH ULTRA CONTROL) solution  diclofenac (VOLTAREN) 1 % GEL topical gel  [DISCONTINUED] gabapentin (NEURONTIN) 100 MG capsule  [DISCONTINUED] metoprolol (TOPROL-XL) 25 MG 24 hr tablet  [DISCONTINUED] atorvastatin (LIPITOR) 20 MG tablet  [DISCONTINUED] clopidogrel (PLAVIX) 75 MG tablet    No current facility-administered medications for this visit.         Additional pertinent details:       Clinical Staff have discussed the Care Coordination Referral with the patient and/or caregiver: yes need    Hamlet Tavares Jr., MD            NEUROLOGY ADULT REFERRAL       Your provider has referred you for the following:   Consult at [facility]: [facility] [phone]   [email or web address]  Patient Active Problem List:     Latent tuberculosis infection     PUD (peptic ulcer disease)     Moderate major depression (H)     CARDIOVASCULAR SCREENING; LDL GOAL LESS THAN 100     Unspecified hyperplasia of prostate with urinary obstruction and other lower urinary tract symptoms (LUTS)     Carpal tunnel syndrome     Disturbance of skin sensation     Vitamin D deficiency     Paranoid schizophrenia (H)     Nocturia     Schizophrenia (H)     Hallucinations     Positive PPD     Essential hypertension, benign     Hyperlipidemia     Tobacco use disorder     Diabetes mellitus, type 2 (H)     Gastroesophageal reflux disease without esophagitis     Health Care Home     Major depression in partial remission (H)     Hyperlipidemia LDL goal <100     Type 2 diabetes mellitus, uncontrolled, with neuropathy (H)     Hypertension goal BP (blood pressure) < 140/90     COPD (chronic obstructive pulmonary disease) (H)     Pain in joint, pelvic region and thigh     Tooth pain     Enlarged prostate with lower urinary tract symptoms (LUTS)     H/O: stroke with residual effects LEFT ARM AND LEG    Current Outpatient Prescriptions:  gabapentin (NEURONTIN) 300 MG capsule  DENTA 5000 PLUS 1.1 %  CREA  metoprolol (TOPROL-XL) 25 MG 24 hr tablet  glimepiride (AMARYL) 4 MG tablet  losartan-hydrochlorothiazide (HYZAAR) 100-12.5 MG per tablet  sitagliptin (JANUVIA) 100 MG tablet  blood glucose monitoring (NO BRAND SPECIFIED) test strip  tamsulosin (FLOMAX) 0.4 MG capsule  cetirizine (ZYRTEC) 10 MG tablet  FLUoxetine (PROZAC) 20 MG capsule  clopidogrel (PLAVIX) 75 MG tablet  lisinopril (PRINIVIL/ZESTRIL) 20 MG tablet  atorvastatin (LIPITOR) 20 MG tablet  pantoprazole (PROTONIX) 40 MG EC tablet  blood glucose calibration (ONETOUCH ULTRA CONTROL) solution  diclofenac (VOLTAREN) 1 % GEL topical gel  [DISCONTINUED] gabapentin (NEURONTIN) 100 MG capsule  [DISCONTINUED] metoprolol (TOPROL-XL) 25 MG 24 hr tablet  [DISCONTINUED] atorvastatin (LIPITOR) 20 MG tablet  [DISCONTINUED] clopidogrel (PLAVIX) 75 MG tablet    No current facility-administered medications for this visit.         Please be aware that coverage of these services is subject to the terms and limitations of your health insurance plan.  Call member services at your health plan with any benefit or coverage questions.      Please bring the following with you to your appointment:    (1) Any X-Rays, CTs or MRIs which have been performed.  Contact the facility where they were done to arrange for  prior to your scheduled appointment.    (2) List of current medications  (3) This referral request   (4) Any documents/labs given to you for this referral                  Follow-up notes from your care team     Return in about 3 weeks (around 2/13/2018).      Your next 10 appointments already scheduled     Feb 16, 2018  7:00 AM CST   (Arrive by 6:45 AM)   New Patient Visit with Sunny Jackson MD   [facility] ([facility] [place])    [address]  [address] [place] [address] [phone]              Who to contact     If you have questions or need follow up information about today's clinic visit or your schedule  "please contact [facility] directly at [phone].  Normal or non-critical lab and imaging results will be communicated to you by MyChart, letter or phone within 4 business days after the clinic has received the results. If you do not hear from us within 7 days, please contact the clinic through MyChart or phone. If you have a critical or abnormal lab result, we will notify you by phone as soon as possible.  Submit refill requests through [employer] or call your pharmacy and they will forward the refill request to us. Please allow 3 business days for your refill to be completed.          Additional Information About Your Visit        [employer] Information     [employer] lets you send messages to your doctor, view your test results, renew your prescriptions, schedule appointments and more. To sign up, go to [email or web address] . Click on \"Log in\" on the left side of the screen, which will take you to the Welcome page. Then click on \"Sign up Now\" on the right side of the page.     You will be asked to enter the access code listed below, as well as some personal information. Please follow the directions to create your username and password.     Your access code is: [email or web address]  Expires: 2018  2:15 PM     Your access code will  in 90 days. If you need help or a new code, please call your [facility] clinic or [phone].        Care EveryWhere ID     This is your Care EveryWhere ID. This could be used by other organizations to access your [facility] medical records  [id number]        Your Vitals Were     Pulse Temperature Respirations Pulse Oximetry BMI (Body Mass Index)       79 96.6  F (35.9  C) (Tympanic) 20 100% 22.56 kg/m2        Blood Pressure from Last 3 Encounters:   18 112/64   17 124/82   17 108/62    Weight from Last 3 Encounters:   18 155 lb (70.3 kg)   17 149 lb (67.6 kg)   17 150 lb (68 kg)              We Performed the " Following     Albumin Random Urine Quantitative with Creat Ratio     Basic metabolic panel     CARE COORDINATION REFERRAL     Hemoglobin A1c     LDL cholesterol direct     NEUROLOGY ADULT REFERRAL          Today's Medication Changes          These changes are accurate as of: 1/23/18  2:16 PM.  If you have any questions, ask your nurse or doctor.               These medicines have changed or have updated prescriptions.        Dose/Directions    gabapentin 300 MG capsule   Commonly known as:  NEURONTIN   This may have changed:    - medication strength  - how much to take   Used for:  Disturbance of skin sensation   Changed by:  Hamlet Tavares MD        Dose:  300 mg   Take 1 capsule (300 mg) by mouth 3 times daily   Quantity:  90 capsule   Refills:  11            Where to get your medicines      These medications were sent to [facility] [address] [place] [facility] [address]  [address] [address]     Phone:  [phone]     gabapentin 300 MG capsule                Primary Care Provider Office Phone # Fax #    Hamlet Tavares [phone] [address] [facility] [address]        Equal Access to Services     [facility] HospitalABRAHAN AH: Hadii aad ku hadasho Soomaali, waaxda luqadaha, qaybta kaalmada adeegyada, waxay idiin yamilethn jonny henriquezarabaylee moore . So [facility] [phone].    ATENCIÓN: Si habla español, tiene a mathur disposición servicios gratuitos de asistencia lingüística. Ll[facility]den[facility] [phone].    We comply with applicable federal civil rights laws and [place] laws. We do not discriminate on the basis of race, color, national origin, age, disability, sex, sexual orientation, or gender identity.            Thank you!     Thank you for choosing [facility]  for your care. Our goal is always to provide you with excellent care. Hearing back from our patients is one way we can continue to improve our services. Please take a few  minutes to complete the written survey that you may receive in the mail after your visit with us. Thank you!             Your Updated Medication List - Protect others around you: Learn how to safely use, store and throw away your medicines at www.disposemymeds.org.          This list is accurate as of: 1/23/18  2:16 PM.  Always use your most recent med list.                   Brand Name Dispense Instructions for use Diagnosis    atorvastatin 20 MG tablet    LIPITOR    90 tablet    Take 1 tablet (20 mg) by mouth daily    Hyperlipidemia LDL goal <100       blood glucose calibration solution     1 Bottle    Use to calibrate blood glucose monitor as directed.    Type 2 diabetes mellitus with diabetic polyneuropathy, without long-term current use of insulin (H)       blood glucose monitoring test strip    no brand specified    100 each    1 strip by In Vitro route daily    Cough       cetirizine 10 MG tablet    zyrTEC    30 tablet    Take 1 tablet (10 mg) by mouth daily    Pruritic disorder       clopidogrel 75 MG tablet    PLAVIX    30 tablet    Take 1 tablet (75 mg) by mouth daily    H/O: stroke with residual effects       DENTA 5000 PLUS 1.1 % Crea   Generic drug:  Sodium Fluoride     51 g    USE FOR DAILY BRUSHING    Tooth pain       diclofenac 1 % Gel topical gel    VOLTAREN    100 g    APPLY 4 GRAMS TO KNEES OR 2 GRAMS TO HANDS BY TOPICAL ROUTE FOUR TIMES DAILY USING ENCLOSED DOSING CARD    Pain in joint, pelvic region and thigh, unspecified laterality       FLUoxetine 20 MG capsule    PROzac    30 capsule    Take 1 capsule (20 mg) by mouth daily    Major depressive disorder, single episode, in partial remission (H)       gabapentin 300 MG capsule    NEURONTIN    90 capsule    Take 1 capsule (300 mg) by mouth 3 times daily    Disturbance of skin sensation       glimepiride 4 MG tablet    AMARYL    60 tablet    Take 1 tablet (4 mg) by mouth two times daily    Type 2 diabetes mellitus with diabetic polyneuropathy,  without long-term current use of insulin (H)       lisinopril 20 MG tablet    PRINIVIL/ZESTRIL    90 tablet    Take 1 tablet (20 mg) by mouth daily    Hyperlipidemia LDL goal <100       losartan-hydrochlorothiazide 100-12.5 MG per tablet    HYZAAR    30 tablet    Take 1 tablet by mouth daily    Essential hypertension with goal blood pressure less than 140/90       metoprolol succinate 25 MG 24 hr tablet    TOPROL-XL    90 tablet    TAKE 1 TABLET (25 MG) BY MOUTH DAILY    Hypertension goal BP (blood pressure) < 140/90       pantoprazole 40 MG EC tablet    PROTONIX    30 tablet    TAKE 1 TABLET (40 MG) BY MOUTH DAILY, 30-60 MINUTES BEFORE A MEAL.    Gastroesophageal reflux disease without esophagitis       sitagliptin 100 MG tablet    JANUVIA    90 tablet    TAKE 1 TABLET (100 MG) BY MOUTH DAILY    Type 2 diabetes mellitus, uncontrolled, with neuropathy (H)       tamsulosin 0.4 MG capsule    FLOMAX    30 capsule    Take 1 capsule (0.4 mg) by mouth daily    Benign non-nodular prostatic hyperplasia with lower urinary tract symptoms

## 2018-01-23 NOTE — PROGRESS NOTES
SUBJECTIVE:     Vikram Rodgers is a 48 year old male who presents to clinic today for the following health issues:            Pain on left side        Duration: 13 months    Description (location/character/radiation): pain and numbness on left side starting from head to toe    Intensity:  Getting worse    Accompanying signs and symptoms: numbness, unable to make a fist     History (similar episodes/previous evaluation): has not seen a specialist    Precipitating or alleviating factors: stroke 13 months ago    Therapies tried and outcome: none         Pain from skull to toes    Wants to         Diabetes Follow-up        Patient is checking blood sugars: OCCASIONAL    Diabetic concerns: None     Symptoms of hypoglycemia (low blood sugar): shaky, dizzy     Paresthesias (numbness or burning in feet) or sores: Yes      Date of last diabetic eye exam: 2 WEEKS AGO            Amount of exercise or physical activity: None    Problems taking medications regularly: No    Medication side effects: none    Diet: regular (no restrictions)                STROKE IN THE LAST 11 MONTHS        Duration: 2/8/2017    Description (location/character/radiation): WEAKNESS ON LEFT SIDE    Intensity:  moderate    Accompanying signs and symptoms: YES    History (similar episodes/previous evaluation): None    Precipitating or alleviating factors: None    Therapies tried and outcome: None     WANTS REFERRAL [facility]     WORRIED ABOUT DYING     COLORED BY PARANOID SCHIZOPHRENIA       Diabetes Follow-up    Patient is checking blood sugars: OVER 200  Diabetic concerns: blood sugar frequently over 200   Symptoms of hypoglycemia (low blood sugar): none   Paresthesias (numbness or burning in feet) or sores: No   Date of last diabetic eye exam: YEARLY RECOMMENDED     Hyperlipidemia Follow-Up    Rate your low fat/cholesterol diet?: fair  Taking statin?   WHO KNOWS  Other lipid medications/supplements?:  none    Hypertension  Follow-up    Outpatient blood pressures are not being checked.  Low Salt Diet: no added salt    BP Readings from Last 2 Encounters:   01/23/18 112/64   11/24/17 124/82     Hemoglobin A1C (%)   Date Value   10/24/2017 11.4 (H)   09/22/2016 9.0 (H)     LDL Cholesterol Calculated (mg/dL)   Date Value   10/24/2017 172 (H)   09/22/2016 145 (H)   Cerebrovascular Follow-up    Patient history: ischemic cerebrovascular incident  Residual symptoms: Difficult walking, Weakness in the arm on the left and Weakness in the leg on the left  Worsened or new symptoms since last visit: No  Daily aspirin use: Yes  Hypertension controlled: Yes    Chronic Pain Follow-Up  LEFT SIDED STROKE PAIN       Type / Location of Pain:  LEFT ARM AND LEG   Analgesia/pain control:       Recent changes:  same      Overall control: Inadequate pain control  Activity level/function:      Daily activities:  Unable to perform most daily activities - chores, hobbies, social activities, driving    Work:  not applicable  Adverse effects:  No  Adherance    Taking medication as directed?  No:     Participating in other treatments: NONE  Risk Factors:    Sleep:  Fair    Mood/anxiety:  worsened    Recent family or social stressors:   FEAR OF DYING    Other aggravating factors:  WALKING OR STANDING  PHQ-9 SCORE 10/24/2017 11/17/2017 1/23/2018   Total Score - - -   Total Score MyChart 13 (Moderate depression) 9 (Mild depression) -   Total Score 13 9 6     No flowsheet data found.  Encounter-Level CSA:     There are no encounter-level csa.                      Problem list and histories reviewed & adjusted, as indicated.    Additional history: as documented      Patient Active Problem List   Diagnosis     Latent tuberculosis infection     PUD (peptic ulcer disease)     Moderate major depression (H)     CARDIOVASCULAR SCREENING; LDL GOAL LESS THAN 100     Unspecified hyperplasia of prostate with urinary obstruction and other lower urinary tract symptoms (LUTS)      Carpal tunnel syndrome     Disturbance of skin sensation     Vitamin D deficiency     Paranoid schizophrenia (H)     Nocturia     Schizophrenia (H)     Hallucinations     Positive PPD     Essential hypertension, benign     Hyperlipidemia     Tobacco use disorder     Diabetes mellitus, type 2 (H)     Gastroesophageal reflux disease without esophagitis     Health Care Home     Major depression in partial remission (H)     Hyperlipidemia LDL goal <100     Type 2 diabetes mellitus, uncontrolled, with neuropathy (H)     Hypertension goal BP (blood pressure) < 140/90     COPD (chronic obstructive pulmonary disease) (H)     Pain in joint, pelvic region and thigh     Tooth pain     Enlarged prostate with lower urinary tract symptoms (LUTS)     H/O: stroke with residual effects LEFT ARM AND LEG     Past Surgical History:   Procedure Laterality Date     ENT SURGERY      sinuses       Social History   Substance Use Topics     Smoking status: Former Smoker     Packs/day: 0.25     Types: Cigarettes     Quit date: 2/16/2017     Smokeless tobacco: Never Used     Alcohol use No     Family History   Problem Relation Age of Onset     Asthma Father      DIABETES Father      Hypertension Father      C.A.D. Father      Neurologic Disorder Brother      Unknown/Adopted Mother      Cancer - colorectal No family hx of          No Known Allergies  BP Readings from Last 3 Encounters:   01/23/18 112/64   11/24/17 124/82   11/17/17 108/62    Wt Readings from Last 3 Encounters:   01/23/18 155 lb (70.3 kg)   11/24/17 149 lb (67.6 kg)   11/17/17 150 lb (68 kg)                  Labs reviewed in EPIC            Reviewed and updated as needed this visit by clinical staffTobacco  Allergies  Meds  Problems  Med Hx  Surg Hx  Fam Hx  Soc Hx            Reviewed and updated as needed this visit by Provider  Allergies  Meds  Problems         .  Current Outpatient Prescriptions   Medication Sig Dispense Refill     gabapentin (NEURONTIN) 300 MG  capsule Take 1 capsule (300 mg) by mouth 3 times daily 90 capsule 11     DENTA 5000 PLUS 1.1 % CREA USE FOR DAILY BRUSHING 51 g 5     metoprolol (TOPROL-XL) 25 MG 24 hr tablet TAKE 1 TABLET (25 MG) BY MOUTH DAILY 90 tablet 3     glimepiride (AMARYL) 4 MG tablet Take 1 tablet (4 mg) by mouth two times daily 60 tablet 11     losartan-hydrochlorothiazide (HYZAAR) 100-12.5 MG per tablet Take 1 tablet by mouth daily 30 tablet 11     sitagliptin (JANUVIA) 100 MG tablet TAKE 1 TABLET (100 MG) BY MOUTH DAILY 90 tablet 0     blood glucose monitoring (NO BRAND SPECIFIED) test strip 1 strip by In Vitro route daily 100 each 11     tamsulosin (FLOMAX) 0.4 MG capsule Take 1 capsule (0.4 mg) by mouth daily 30 capsule 11     cetirizine (ZYRTEC) 10 MG tablet Take 1 tablet (10 mg) by mouth daily 30 tablet 11     FLUoxetine (PROZAC) 20 MG capsule Take 1 capsule (20 mg) by mouth daily 30 capsule 11     clopidogrel (PLAVIX) 75 MG tablet Take 1 tablet (75 mg) by mouth daily 30 tablet 11     lisinopril (PRINIVIL/ZESTRIL) 20 MG tablet Take 1 tablet (20 mg) by mouth daily 90 tablet 0     atorvastatin (LIPITOR) 20 MG tablet Take 1 tablet (20 mg) by mouth daily 90 tablet 0     pantoprazole (PROTONIX) 40 MG EC tablet TAKE 1 TABLET (40 MG) BY MOUTH DAILY, 30-60 MINUTES BEFORE A MEAL. 30 tablet 10     blood glucose calibration (ONETOUCH ULTRA CONTROL) solution Use to calibrate blood glucose monitor as directed. 1 Bottle 11     diclofenac (VOLTAREN) 1 % GEL topical gel APPLY 4 GRAMS TO KNEES OR 2 GRAMS TO HANDS BY TOPICAL ROUTE FOUR TIMES DAILY USING ENCLOSED DOSING CARD 100 g 0     [DISCONTINUED] gabapentin (NEURONTIN) 100 MG capsule Take 1 capsule (100 mg) by mouth 3 times daily 90 capsule 1     [DISCONTINUED] metoprolol (TOPROL-XL) 25 MG 24 hr tablet Take 1 tablet (25 mg) by mouth daily (Patient not taking: Reported on 1/23/2018) 30 tablet 0     [DISCONTINUED] atorvastatin (LIPITOR) 20 MG tablet Take 1 tablet (20 mg) by mouth daily (Patient  not taking: Reported on 2018) 90 tablet 0     [DISCONTINUED] clopidogrel (PLAVIX) 75 MG tablet Take 1 tablet (75 mg) by mouth daily (Patient not taking: Reported on 2018) 39 tablet 0        No Known Allergies    Immunization History   Administered Date(s) Administered     Influenza (IIV3) PF 10/04/2010, 2013     Influenza Vaccine IM 3yrs+ 4 Valent IIV4 10/06/2014     Pneumococcal 23 valent 2013     TD (ADULT, 7+) 2010         reports that he does not drink alcohol.      reports that he does not use illicit drugs.    family history includes Asthma in his father; C.A.D. in his father; DIABETES in his father; Hypertension in his father; Neurologic Disorder in his brother; Unknown/Adopted in his mother. There is no history of Cancer - colorectal.    indicated that the status of his no family hx of is unknown. He indicated that the status of his mother is unknown. He indicated that his father is . He indicated that the status of his brother is unknown.      has a past surgical history that includes ENT surgery.     reports that he does not currently engage in sexual activity.  .  Pediatric History   Patient Guardian Status     Not on file.     Other Topics Concern     Parent/Sibling W/ Cabg, Mi Or Angioplasty Before 65f 55m? No     unknown     Social History Narrative         reports that he quit smoking about 11 months ago. His smoking use included Cigarettes. He smoked 0.25 packs per day. He has never used smokeless tobacco.    Medical, social, surgical, and family histories reviewed.      BP Readings from Last 6 Encounters:   18 112/64   17 124/82   17 108/62   10/24/17 104/64   16 126/66   16 132/70       Wt Readings from Last 3 Encounters:   18 155 lb (70.3 kg)   17 149 lb (67.6 kg)   17 150 lb (68 kg)         Positive symptoms or findings indicated by bold designation:     ROS: 10 point ROS neg other than the symptoms noted above in  the HPI.except  has Latent tuberculosis infection; PUD (peptic ulcer disease); Moderate major depression (H); CARDIOVASCULAR SCREENING; LDL GOAL LESS THAN 100; Unspecified hyperplasia of prostate with urinary obstruction and other lower urinary tract symptoms (LUTS); Carpal tunnel syndrome; Disturbance of skin sensation; Vitamin D deficiency; Paranoid schizophrenia (H); Nocturia; Schizophrenia (H); Hallucinations; Positive PPD; Essential hypertension, benign; Hyperlipidemia; Tobacco use disorder; Diabetes mellitus, type 2 (H); Gastroesophageal reflux disease without esophagitis; Health Care Home; Major depression in partial remission (H); Hyperlipidemia LDL goal <100; Type 2 diabetes mellitus, uncontrolled, with neuropathy (H); Hypertension goal BP (blood pressure) < 140/90; COPD (chronic obstructive pulmonary disease) (H); Pain in joint, pelvic region and thigh; Tooth pain; Enlarged prostate with lower urinary tract symptoms (LUTS); and H/O: stroke with residual effects LEFT ARM AND LEG on his problem list.   Constitutional: The patient denied fatigue, fever, insomnia, night sweats, recent illness and weight loss.      Eyes: The patient denied blindness, eye pain, eye tearing, photophobia, vision change and visual disturbance.       Ears/Nose/Throat/Neck: The patient denied dizziness, facial pain, hearing loss, nasal discharge, oral pain, otalgia, postnasal drip, sinus congestion, sore throat, tinnitus and voice change.       Cardiovascular: The patient denied arrhythmia, chest pain/pressure, claudication, edema, exercise intolerance, fatigue, orthopnea, palpitations and syncope.      Respiratory: The patient denied asthma, chest congestion, cough, dyspnea on exertion, dyspnea/shortness of breath, hemoptysis, pedal edema, pleuritic pain, productive sputum, snoring and wheezing.     Gastrointestinal: The patient denied abdominal pain, anorexia, constipation, diarrhea, dysphagia, gastroesophageal reflux,  hematochezia, hemorrhoids, melena, nausea and vomiting .     Genitourinary/Nephrology: The patient denied breast complaint, dysuria, nocturia sexual dysfunction, t, urinary frequency, urinary incontinence, urinary urgency        Musculoskeletal: The patient denied arthralgia(s), back pain, joint complaint, muscle weakness, myalgias, osteoporosis, sciatica, stiffness and swelling.    ANTALGIC GAIT WITH CRUTCH     Dermatoligic:: The patient denied acne, dermatitis, ecchymosis, itching, mole change, rash, skin cancer, skin lesion and sores.      Neurologic: The patient denied dizziness, gait abnormality, headache, memory loss, mental status change, paresis, paresthesia, seizure, syncope, tremor and vision change.   LEFT SIDED STROKE AND TENDENCY TO FALL     Psychiatric: The patient denied anxiety, depression, disturbances of memory, drug abuse, insomnia, mood swings and relationship difficulties.  PARANOID SCHIZOPHRENIA     Endocrine: The patient denied , goiter, obesity, polyuria and thyroid disease.      Hematologic/Lymphatic: The patient denied abnormal bleeding and bruising, abnormal ecchymoses, anemia, lymph node enlargement/mass, petechiae and venous  Thrombosis.      Allergy/Immunology: The patient denied food allergy and  Allergic rhinitis or conjunctivitis.        PE:  /64  Pulse 79  Temp 96.6  F (35.9  C) (Tympanic)  Resp 20  Wt 155 lb (70.3 kg)  SpO2 100%  BMI 22.56 kg/m2 Body mass index is 22.56 kg/(m^2).    Constitutional: general appearance, well nourished, well developed, in no acute distress, well developed, appears stated age, normal body habitus,      Eyes:; The patient has normal eyelids sclerae and conjunctivae :      Ears/Nose/Throat: external ear, overall: normal appearance; external nose, overall: benign appearance, normal moujth gums and lips  The patient has:      Neck: thyroid, overall: normal size, normal consistency, nontender,      Respiratory:  palpation of chest, overall:  normal excursion,    Clear to percussion and auscultation  NORMAL     Tachypnea  NORMAL  Color  NORMAL     Cardiovascular:  Good color with no peripheral edema  NORMAL   Regular sinus rhythm without murmur. Physiologic heart sounds Heart is unelarged  .   Chest/Breast: normal shape NORMAL      Abdominal exam,  Liver and spleen are  unenlarged  NORMAL       Tenderness  NORMAL   Scars  NORMAL      Urogenital; no renal, flank or bladder  tenderness;      Lymphatic: neck nodes,     Other nodes     Musculoskeletal:  Brief ortho exam normal except:   LEFT ARM AND LEG WEAKNESS      Integument: inspection of skin, no rash, lesions; and, palpation, no induration, no tenderness.      Neurologic mental status, overall: alert and oriented; gait, no ataxia, no unsteadiness; coordination, no tremors; cranial nerves, overall: normal motor, overall: normal bulk, tone.  LEFT SIDED STROKE     Psychiatric: orientation/consciousness, overall: oriented to person, place and time; behavior/psychomotor activity, no tics, normal psychomotor activity; mood and affect, overall: normal mood and affect; appearance, overall: well-groomed, good eye contact; speech, overall: normal quality, no aphasia and normal quality, quantity, intact.      Diagnostic Test Results:  Results for orders placed or performed in visit on 10/24/17   Basic metabolic panel   Result Value Ref Range    Sodium 136 133 - 144 mmol/L    Potassium 4.2 3.4 - 5.3 mmol/L    Chloride 102 94 - 109 mmol/L    Carbon Dioxide 22 20 - 32 mmol/L    Anion Gap 12 3 - 14 mmol/L    Glucose 371 (H) 70 - 99 mg/dL    Urea Nitrogen 15 7 - 30 mg/dL    Creatinine 0.60 (L) 0.66 - 1.25 mg/dL    GFR Estimate >90 >60 mL/min/1.7m2    GFR Estimate If Black >90 >60 mL/min/1.7m2    Calcium 9.3 8.5 - 10.1 mg/dL   Lipid panel reflex to direct LDL Fasting   Result Value Ref Range    Cholesterol 247 (H) <200 mg/dL    Triglycerides 170 (H) <150 mg/dL    HDL Cholesterol 41 >39 mg/dL    LDL Cholesterol  Calculated 172 (H) <100 mg/dL    Non HDL Cholesterol 206 (H) <130 mg/dL   Albumin Random Urine Quantitative with Creat Ratio   Result Value Ref Range    Creatinine Urine 92 mg/dL    Albumin Urine mg/L 7 mg/L    Albumin Urine mg/g Cr 7.69 0 - 17 mg/g Cr   Hemoglobin A1c   Result Value Ref Range    Hemoglobin A1C 11.4 (H) 4.3 - 6.0 %   ALT   Result Value Ref Range    ALT 50 0 - 70 U/L         ICD-10-CM    1. H/O: stroke with residual effects LEFT ARM AND LEG I69.30 NEUROLOGY ADULT REFERRAL     LDL cholesterol direct   2. Hypertension goal BP (blood pressure) < 140/90 I10 CARE COORDINATION REFERRAL   3. Hyperlipidemia LDL goal <100 E78.5 CARE COORDINATION REFERRAL     LDL cholesterol direct     Hemoglobin A1c     Basic metabolic panel     Albumin Random Urine Quantitative with Creat Ratio   4. Type 2 diabetes mellitus, uncontrolled, with neuropathy (H) E11.40 CARE COORDINATION REFERRAL    E11.65    5. Paranoid schizophrenia (H) F20.0    6. Disturbance of skin sensation R20.9 gabapentin (NEURONTIN) 300 MG capsule     NEUROLOGY ADULT REFERRAL        .  Side effects benefits and risks thoroughly discussed. .he may come in early if unimproved or getting worse      Importance of adhering to regimen discussed and if medications were dispensed, the importance of taking medications discussed and bringing in the medications after every visit for chronic problems     Please drink 2 glasses of water prior to meals and walk 15-30 minutes after meals    I spent  25 MINUTES SPENT  with patient discussing the following issues   The primary encounter diagnosis was H/O: stroke with residual effects LEFT ARM AND LEG. Diagnoses of Hypertension goal BP (blood pressure) < 140/90, Hyperlipidemia LDL goal <100, Type 2 diabetes mellitus, uncontrolled, with neuropathy (H), Paranoid schizophrenia (H), and Disturbance of skin sensation were also pertinent to this visit. over half of which involved counseling and coordination of care.    Patient  Instructions   (I69.30) H/O: stroke with residual effects LEFT ARM AND LEG  (primary encounter diagnosis)  Comment:    Plan: NEUROLOGY ADULT REFERRAL, LDL cholesterol         direct             (I10) Hypertension goal BP (blood pressure) < 140/90  Comment:    Plan: CARE COORDINATION REFERRAL             (E78.5) Hyperlipidemia LDL goal <100  Comment:    Plan: CARE COORDINATION REFERRAL, LDL cholesterol         direct, Hemoglobin A1c, Basic metabolic panel,         Albumin Random Urine Quantitative with Creat         Ratio             (E11.40,  E11.65) Type 2 diabetes mellitus, uncontrolled, with neuropathy (H)  Comment:    Plan: CARE COORDINATION REFERRAL             (F20.0) Paranoid schizophrenia (H)  Comment:    Plan:      (R20.9) Disturbance of skin sensation  Comment:    Plan: gabapentin (NEURONTIN) 300 MG capsule,         NEUROLOGY ADULT REFERRAL                        ALL THE ABOVE PROBLEMS ARE STABLE AND MED CHANGES AS NOTED    Diet:  MEDITERRANEAN DIET AND DIABETES     Exercise:  BALANCE   Exercises Range of motion, balance, isometric, and strengthening exercises 30 repetitions twice daily of involved joints      .MONET ALLEN MD 1/23/2018 2:30 PM  January 23, 2018

## 2018-01-24 ENCOUNTER — CARE COORDINATION (OUTPATIENT)
Dept: CARE COORDINATION | Facility: CLINIC | Age: 48
End: 2018-01-24

## 2018-01-24 LAB
ANION GAP SERPL CALCULATED.3IONS-SCNC: 5 MMOL/L (ref 3–14)
BUN SERPL-MCNC: 11 MG/DL (ref 7–30)
CALCIUM SERPL-MCNC: 8.6 MG/DL (ref 8.5–10.1)
CHLORIDE SERPL-SCNC: 106 MMOL/L (ref 94–109)
CO2 SERPL-SCNC: 25 MMOL/L (ref 20–32)
CREAT SERPL-MCNC: 0.6 MG/DL (ref 0.66–1.25)
CREAT UR-MCNC: 160 MG/DL
GFR SERPL CREATININE-BSD FRML MDRD: >90 ML/MIN/1.7M2
GLUCOSE SERPL-MCNC: 169 MG/DL (ref 70–99)
LDLC SERPL DIRECT ASSAY-MCNC: 164 MG/DL
MICROALBUMIN UR-MCNC: 11 MG/L
MICROALBUMIN/CREAT UR: 6.75 MG/G CR (ref 0–17)
POTASSIUM SERPL-SCNC: 3.9 MMOL/L (ref 3.4–5.3)
SODIUM SERPL-SCNC: 136 MMOL/L (ref 133–144)

## 2018-01-24 NOTE — PROGRESS NOTES
"Clinic Care Coordination Contact  OUTREACH    Referral Information:  Referral Source: PCP  Reason for Contact: initial< use of , spoke to patient  Care Conference: No     Universal Utilization:   Last PCP appointment: 01/23/18    Clinical Concerns:  Current Medical Concerns:   Patient Active Problem List   Diagnosis     Latent tuberculosis infection     PUD (peptic ulcer disease)     Moderate major depression (H)     CARDIOVASCULAR SCREENING; LDL GOAL LESS THAN 100     Unspecified hyperplasia of prostate with urinary obstruction and other lower urinary tract symptoms (LUTS)     Carpal tunnel syndrome     Disturbance of skin sensation     Vitamin D deficiency     Paranoid schizophrenia (H)     Nocturia     Schizophrenia (H)     Hallucinations     Positive PPD     Essential hypertension, benign     Hyperlipidemia     Tobacco use disorder     Diabetes mellitus, type 2 (H)     Gastroesophageal reflux disease without esophagitis     Health Care Home     Major depression in partial remission (H)     Hyperlipidemia LDL goal <100     Type 2 diabetes mellitus, uncontrolled, with neuropathy (H)     Hypertension goal BP (blood pressure) < 140/90     COPD (chronic obstructive pulmonary disease) (H)     Pain in joint, pelvic region and thigh     Tooth pain     Enlarged prostate with lower urinary tract symptoms (LUTS)     H/O: stroke with residual effects LEFT ARM AND LEG   Current Behavioral Concerns: not discussed    Education Provided to patient: none     Medication Management:  Patient manages     Functional Status:  Mobility Status: Independent  Transportation: [employer] transportation     Psychosocial:  Current living arrangement:: Not Assessed  Financial/Insurance: in[employer], just switched from [employer]  Would like to increase his PCA hours. States he is currently approved for about an hour a day and previously had a PCA but she recently quit \" because my hours were not so good\".  States he is declining, " having more difficulty cooking and sometimes difficulty with the bathroom.  Feels it has been close to a year since he was last assessed for PCA hours.    CCSW explained he would need to go through his [facility] [place] care coordinator and she would need to make adjustments. He is not sure who this is yet. States he heard her name was Hardy but has no number to reach her. Has tried to call [facility] but states the number does not go anywhere.     CCSW called [facility] and spoke with their navigation services and worker named stoney ([phone]). She states the patient does not have a care coordinator assigned as of yet. [facility] has been trying to call to assign him on but they did not have a phone number to call him. CCSW provided his home phone number. Stoney states normally the care coordinator would help due to pca assessment but since he is on a [place] connect plan his PCA hours are actually handled by [facility] not [facility]. Gave this CCSW the number for [facility] pca management ([phone]).  States she will have scheduling call him to get care coordination set up. States the CC from [facility] will not do the PCA assessment but can help assist him with the referral and process.     CCSW will call the patient within 1 business days to discuss with him     Resources and Interventions:  Current Resources:  ;    Advanced Care Plans/Directives on file:: No    Barriers: language, limited support  Strengths: willing to seek assistance for resources  Patient/Caregiver understanding: fair  Frequency of Care Coordination: prn     PLAN:  Pt will outreach as needed  CCSW will follow up in 1 business day    Judith Robles, Social Work Care Coordinator, LGSW, MSW  [facility]: [facility] and [place]   P:[phone]/ Signed January 24, 2018

## 2018-01-25 NOTE — PROGRESS NOTES
Clinic Care Coordination Contact  [facility]/Voicemail    Clinical Data: called with [place] . Stated he will need to call [facility] PCA services to increase his PCA hours. Left him the number below and stated he can hit #2 for an .     Outreach attempted x 1.  Left message on voicemail with call back information and requested return call.  Plan: Care coordinator will try again in 1 week.    Judith Robles, Social Work Care Coordinator, [facility], [facility]: [place], [place], and [place]   P:[phone] / Signed January 25, 2018

## 2018-02-07 NOTE — PROGRESS NOTES
Clinic Care Coordination Contact  Care Team Conversations    Called the patient. He did get this CCSw's last voicemail and call the [facility]. They came out and did an assessment and stated he should see an increase in PCA hours, but did not tell him how much he will receive. Sounds as if the [facility] is still processing his last assessment and may take some weeks for him to see a result. CCSW will plan to follow up in 1 month for check in.    Pt had several other requests.    He would like a PT referral placed to start working on his right arm and right leg.CCSW will route request to his PCP.    Pt also has concerns about his post stroke follow up care. CCSW noted after his stroke in February 2017 the pt did not seek follow up care until October 2017 in [place]. Pt states he did not seek care anywhere else either. Stated he was on [email or web address] which [place] was not in network for. Eventually got on [facility] and followed up with primary care. Pt expressed frustration of not feeling that there has been a post stroke follow up plan put in place and is frustrated his PCP will not give him medications to treat his neuropathy and pain.  Pt states he asked his pharmacist for medications to help treat his nerve pain and they had given him some recommendations but stated the pt's primary PCP would need to prescribe them. Pt states his PCP did not prescribe this medication and instead gave him gabapentin which does not fully treat his pain. CCSW reviewed the PCP's last note with the pt. Pt is unable to list the specific medication he had requested for the PCP to fill, but this CCSW stated the POC reflected to start gabapentin and establish with neurology. CCSW assisted the pt in making a follow up appointment with another provider at the clinic ( his will be out for a few weeks on vacation) and he will see them on Monday 2/12.    PLAN:  CCSW will review the chart after the pt's appointment on 2/12  CCSW will route request to  PCP for PT referral  Pt will outreach as needed  CCSW will follow up in 1 month with the pt or sooner if needed    Judith Robles, Social Work Care Coordinator, LGSW, MSW  [facility]: [facility], and [place]   P:[phone]/ Signed February 7, 2018

## 2018-02-09 DIAGNOSIS — I69.354 HEMIPARESIS AFFECTING LEFT SIDE AS LATE EFFECT OF STROKE (H): ICD-10-CM

## 2018-02-10 PROBLEM — I69.30 H/O: STROKE WITH RESIDUAL EFFECTS: Chronic | Status: ACTIVE | Noted: 2017-02-08

## 2018-02-10 NOTE — PROGRESS NOTES
STROKE IN THE LAST 11 MONTHS     Duration: 2/8/2017  Description (location/character/radiation): WEAKNESS ON LEFT SIDE  Intensity:  moderate  Accompanying signs and symptoms: YES  History (similar episodes/previous evaluation): None  Precipitating or alleviating factors: None  Therapies tried and outcome: None     WANTS REFERRAL [facility]     WORRIED ABOUT DYING     COLORED BY PARANOID SCHIZOPHRENIA

## 2018-02-12 ENCOUNTER — OFFICE VISIT (OUTPATIENT)
Dept: FAMILY MEDICINE | Facility: CLINIC | Age: 48
End: 2018-02-12
Payer: MEDICAID

## 2018-02-12 VITALS
HEIGHT: 70 IN | TEMPERATURE: 97.8 F | RESPIRATION RATE: 20 BRPM | OXYGEN SATURATION: 100 % | DIASTOLIC BLOOD PRESSURE: 70 MMHG | SYSTOLIC BLOOD PRESSURE: 144 MMHG | WEIGHT: 163 LBS | BODY MASS INDEX: 23.34 KG/M2 | HEART RATE: 90 BPM

## 2018-02-12 DIAGNOSIS — I10 HYPERTENSION GOAL BP (BLOOD PRESSURE) < 140/90: Chronic | ICD-10-CM

## 2018-02-12 DIAGNOSIS — E78.5 HYPERLIPIDEMIA LDL GOAL <100: Chronic | ICD-10-CM

## 2018-02-12 DIAGNOSIS — K21.9 GASTROESOPHAGEAL REFLUX DISEASE WITHOUT ESOPHAGITIS: Chronic | ICD-10-CM

## 2018-02-12 DIAGNOSIS — I69.30 H/O: STROKE WITH RESIDUAL EFFECTS: Primary | Chronic | ICD-10-CM

## 2018-02-12 PROCEDURE — T1013 SIGN LANG/ORAL INTERPRETER: HCPCS | Mod: U3 | Performed by: INTERNAL MEDICINE

## 2018-02-12 PROCEDURE — 99215 OFFICE O/P EST HI 40 MIN: CPT | Performed by: INTERNAL MEDICINE

## 2018-02-12 RX ORDER — PANTOPRAZOLE SODIUM 40 MG/1
TABLET, DELAYED RELEASE ORAL
Qty: 30 TABLET | Refills: 10 | Status: SHIPPED | OUTPATIENT
Start: 2018-02-12 | End: 2018-02-16

## 2018-02-12 RX ORDER — CLOPIDOGREL BISULFATE 75 MG/1
75 TABLET ORAL DAILY
Qty: 30 TABLET | Refills: 11 | Status: SHIPPED | OUTPATIENT
Start: 2018-02-12 | End: 2018-04-03

## 2018-02-12 NOTE — PROGRESS NOTES
Clinic Care Coordination Contact  Care Team Conversations    Called the patient  Gave him the number for the physical therapy referral line.  Pt will call if he has any questions.    Judith Robles, Social Work Care Coordinator, [facility], [facility]: [facility], and [place]  P:[phone]/ Signed February 12, 2018

## 2018-02-12 NOTE — PATIENT INSTRUCTIONS
Your diabetes control is not good.  Be sure to take the glimepiride and the Januvia every day.                       Also, your cholesterol is too high.  Be sure to take the atorvastatin every day.                                     Be sure to go to the neurology appointment and go to physical therapy.                         Also, your blood pressure is too high; take the losartan/hctz, and also the metoprolol 25 mg per day.                                                              See Dr Tavares or me in 3 weeks.          Bring in all of your pill bottles on that visit !!!

## 2018-02-12 NOTE — PROGRESS NOTES
"  SUBJECTIVE:   Vikram Rodgers is a 48 year old male who presents to clinic today for the following health issues:          \"Follow-up stroke and multiple other issues\"              I have not met this patient prior to today.               This patient is here with an .     He arrives limping using a cane.    He has pressured speech, and the  has difficulty interrupting him to interpret what he is saying.        I reviewed care everywhere, and transferred some of the information regarding his stroke and subsequent rehab into the problem list.                 He already has a neurology appointment set up for later this week, as well as physical therapy.         He wants me to give him a generic physical therapy order so he can have his physical therapy closer to home.              He complains of left-sided numbness and tingling and perhaps pain as well.  Gabapentin was recently started.  He he states he is taking it.     In terms of his other medications, he may have stopped or run out of plavix,toprol,protonix.   He claims he is taking 2 diabetes medications, one cholesterol medication, one blood pressure medication, 1 \"stomach\" medication (though apparently he has run out of the Protonix).                                                          He also wants a handicapped parking sticker; does not drive, but gets rides.                                               His previous records indicate a diagnosis of paranoid schizophrenia.              So far, I have not found any psychiatry notes.           Problem list and histories reviewed & adjusted, as indicated.      Patient Active Problem List    Diagnosis Date Noted     H/O: stroke with residual effects LEFT ARM AND LEG 02/08/2017     Priority: High     [facility] NOTES:    Echo: TTE completed 2/11/2017. ASHTYN completed 2/13/2017.  Imaging: head CT completed 2/10/2017, brain MRI/MRA completed 2/10/2017.  Endovascular procedure: None    BRIEF " HOSPITAL COURSE: Acute ischemic stroke: Patient presented with 3 days left-sided weakness and perioral paraesthesias. Found to have right PCA occlusion. IV tPA was not given. Etiology is thought to be cardioembolic. Patient passed swallow evaluation before restarting on PO diet. Patient was evaluated by PT, OT, and SLP. Patient was on pharmacologic DVT prophylaxis while in hospital. No complications were observed. Patient's exam continued deteriorated over the initial period of stay, which could be expected based on the location of his stroke. He reached a steady-state 2/12/2017.                                                                                                                                                                     HE WAS TRANSFERRED TO A REHAB CENTER, AND LATER TO A NH FOR PT AND NEUROPSYCH CARE; LATER, HE DID NOT F/U FOR APPTS AT [facility] AND RETURNED IN 10/17 TO [facility]                                                                                                                                                                                                                                                                                                                                    here is the rehab center discharge summary:   Vikram Rodgers is a 47 y.o. Male with PMH TBI, hypertension, hyperlipidemia, tobacco dependence, type 2 diabetes, depression, and peptic ulcer disease, admitted [place] [facility] [place]17, found to have R posterior artery stroke. No tPA given. Etiology thought to be cardioembolic. He was admitted to [facility] on 2/16/17.    [facility] REHAB COURSE:   While at [facility], Mr. Rodgers progressed well with the multidisciplinary team. The hospitalist was consulted to assist team with transitioning from insulin to oral anti-hyperglycemics. He was ultimately restarted on his home diabetes regimen. His blood glucose was stable prior to discharge. He still needed assistance  with ambulation due to unsteady gait, and his family/ friends were unable to provide 24/7 supervision/ assist at home. Due to inability to find safe community discharge, social work found placement at [facility] subacute rehab. Discharge was delayed as pt was impulsive, requiring alarms, due to cognitive deficits. He was able to use a call light appropriately prior to discharge.     Primary Rehabilitation Diagnosis: R stroke  Rehab Course: Please refer to the individual therapy discipline consult and discharge summaries for further details.  PT- Ambulating with min A using cane, with scissoring gait  - Ongoing PT recommended at [facility]    OT- SBA for upper body dressing, min A for lower body dressing, SBA for sit to/ from stand  - Ongoing OT recommended at [facility]     SLP-ongoing moderate impairments in areas of attention, memory, problem solving related to stroke.  - Ongoing SLP recommended at [facility]    Therapeutic Recreation-Provided education on substance use/abuse, community re-integration, intimacy, and pedestrian safety.  Psychology-  Dr. Howard (clinical psych) - coping well  Dr. David (neuro psych) - average auditory memory, showing some slight improvement in visual scanning accuracy, no improvements in visual construction and memory  Bowel function-Continue bowel regimen for goal of one formed BM daily, continent  Bladder function- Continent, cont Flomax  Swallow/Nutrition-Regular diet, carb low, thin liquid  Prophylaxis-Fully ambulatory  Pain-May take tylenol PRN    Medical Co-morbidities:   Right PCA stroke  - Statin- Continue lipitor 80mg day. LDL goal < 70  - Antithrombotic - Continue ASA 81mg /day, plavix 75mg/ day (for 3 months - ends ~ 5/16/17)  - Antihypertensive - BP goal of < 140/90 is minimum. BP < 120/80 is ideal. Continue lisinopril and metoprolol.   - Antiglycemic- see management below  - Diabetes educator - Hgb A1C 8.1 on 2/11, DM (HgbA1c > 8.0) must have a referral to the diabetes educator following  discharge   - Continue fluoxetine 20mg daily for post-stroke motor recovery      Diabetes Type 2  - Sliding scale insulin and lantus DC'd, Metformin 1000mg BID switched to amaryl 4mg daily and januvia 100mg daily on 3/21 per pt's complaints of feeling nauseated and feverish with metformin. He reported that this has been stopped/ switched as an outpatient due to similar symptoms.       Hypertension  - Continue lisinopril 20mg daily. Metoprolol XL 25mg daily      Tobacco Dependence  - Pt declined use of patch/ gum stating he no longer needing these. Continue to encourage smoking cessation      Peptic Ulcer Disease  - Continue protonix (formulary substitution for PTA omeprazole)      Depression  - Continue fluoxetine 20mg daily      Seasonal allergies   - Continue zyrtec      BPH  - Continue flomax    RECOMMENDATIONS AND FOLLOWUP:   - Follow up with cardiology for outpatient stress test and review event monitor  - Will need 30 day event monitor after discharge  - Follow up with diabetes educator for diabetes type II  - Follow up in endocrinology  - Follow up with PCP  - Refer to sleep center  - Follow up in stroke neurology clinic  - Follow up with physical medicine and rehab           COPD (chronic obstructive pulmonary disease) (H) 11/28/2014     Priority: High     Type 2 diabetes mellitus, uncontrolled, with neuropathy (H) 02/05/2014     Priority: High     Hypertension goal BP (blood pressure) < 140/90 02/05/2014     Priority: High     Paranoid schizophrenia (H) 11/16/2012     Priority: High     Problem list name updated by automated process. Provider to review       Schizophrenia (H) 11/16/2012     Priority: High     Problem list name updated by automated process. Provider to review  Diagnosis updated by automated process. Provider to review and confirm.       Tobacco use disorder 11/16/2012     Priority: High     Moderate major depression (H)      Priority: High     Enlarged prostate with lower urinary tract  symptoms (LUTS) 01/11/2016     Priority: Medium     Tooth pain 11/24/2015     Priority: Medium     Pain in joint, pelvic region and thigh 03/20/2015     Priority: Medium     Hyperlipidemia LDL goal <100 08/13/2013     Priority: Medium     Major depression in partial remission (H) 06/21/2013     Priority: Medium     Carpal tunnel syndrome 11/16/2012     Priority: Medium     Nerve compression right hand       Disturbance of skin sensation 11/16/2012     Priority: Medium     Right facial       Vitamin D deficiency 11/16/2012     Priority: Medium     Problem list name updated by automated process. Provider to review       Nocturia 11/16/2012     Priority: Medium     Hallucinations 11/16/2012     Priority: Medium     Positive PPD 11/16/2012     Priority: Medium     Hyperlipidemia 11/16/2012     Priority: Medium     Problem list name updated by automated process. Provider to review       Gastroesophageal reflux disease without esophagitis 11/16/2012     Priority: Medium     Unspecified hyperplasia of prostate with urinary obstruction and other lower urinary tract symptoms (LUTS) 03/19/2012     Priority: Medium     Latent tuberculosis infection 08/03/2010     Priority: Medium     PUD (peptic ulcer disease) 08/03/2010     Priority: Medium     Health Care Home 04/18/2013     Priority: Low     Not Open to Clinic Care Coordination:  Community Supports:  PCA Services  [place] Visit Nurse Neli, completes PCA Assessments- Tabby LAUREN [phone]  [facility], Greg Muller Care Guide [phone]  Pt may have an AMHRS worker thru [facility].          Past Surgical History:   Procedure Laterality Date     ENT SURGERY      sinuses     Social History     Social History     Marital status: Single     Spouse name: N/A     Number of children: N/A     Years of education: N/A     Occupational History     Not on file.     Social History Main Topics     Smoking status: Former Smoker     Packs/day: 0.25     Types: Cigarettes     Quit  date: 2/16/2017     Smokeless tobacco: Never Used     Alcohol use No     Drug use: No     Sexual activity: No     Other Topics Concern     Parent/Sibling W/ Cabg, Mi Or Angioplasty Before 65f 55m? No     unknown     Social History Narrative     Immunization History   Administered Date(s) Administered     Influenza (IIV3) PF 10/04/2010, 09/30/2013     Influenza Vaccine IM 3yrs+ 4 Valent IIV4 10/06/2014     Pneumococcal 23 valent 09/30/2013     TD (ADULT, 7+) 01/01/2010       Current Outpatient Prescriptions   Medication Sig Dispense Refill     gabapentin (NEURONTIN) 300 MG capsule Take 1 capsule (300 mg) by mouth 3 times daily 90 capsule 11     DENTA 5000 PLUS 1.1 % CREA USE FOR DAILY BRUSHING 51 g 5     metoprolol (TOPROL-XL) 25 MG 24 hr tablet TAKE 1 TABLET (25 MG) BY MOUTH DAILY 90 tablet 3     glimepiride (AMARYL) 4 MG tablet Take 1 tablet (4 mg) by mouth two times daily 60 tablet 11     losartan-hydrochlorothiazide (HYZAAR) 100-12.5 MG per tablet Take 1 tablet by mouth daily 30 tablet 11     sitagliptin (JANUVIA) 100 MG tablet TAKE 1 TABLET (100 MG) BY MOUTH DAILY 90 tablet 0     blood glucose monitoring (NO BRAND SPECIFIED) test strip 1 strip by In Vitro route daily 100 each 11     tamsulosin (FLOMAX) 0.4 MG capsule Take 1 capsule (0.4 mg) by mouth daily 30 capsule 11     cetirizine (ZYRTEC) 10 MG tablet Take 1 tablet (10 mg) by mouth daily 30 tablet 11     FLUoxetine (PROZAC) 20 MG capsule Take 1 capsule (20 mg) by mouth daily 30 capsule 11     clopidogrel (PLAVIX) 75 MG tablet Take 1 tablet (75 mg) by mouth daily 30 tablet 11     lisinopril (PRINIVIL/ZESTRIL) 20 MG tablet Take 1 tablet (20 mg) by mouth daily 90 tablet 0     atorvastatin (LIPITOR) 20 MG tablet Take 1 tablet (20 mg) by mouth daily 90 tablet 0     pantoprazole (PROTONIX) 40 MG EC tablet TAKE 1 TABLET (40 MG) BY MOUTH DAILY, 30-60 MINUTES BEFORE A MEAL. 30 tablet 10     blood glucose calibration (ONETOUCH ULTRA CONTROL) solution Use to calibrate  "blood glucose monitor as directed. 1 Bottle 11     diclofenac (VOLTAREN) 1 % GEL topical gel APPLY 4 GRAMS TO KNEES OR 2 GRAMS TO HANDS BY TOPICAL ROUTE FOUR TIMES DAILY USING ENCLOSED DOSING CARD 100 g 0     No Known Allergies  BP Readings from Last 3 Encounters:   02/12/18 144/70   01/23/18 112/64   11/24/17 124/82    Wt Readings from Last 3 Encounters:   02/12/18 163 lb (73.9 kg)   01/23/18 155 lb (70.3 kg)   11/24/17 149 lb (67.6 kg)                    Reviewed and updated as needed this visit by clinical staff       Reviewed and updated as needed this visit by Provider         ROS:  CONSTITUTIONAL:NEGATIVE  for chills, fever  and weight loss  RESP:NEGATIVE for significant cough or SOB  CV: NEGATIVE for chest pain, palpitations or peripheral edema  NEURO: POSITIVE for gait disturbance, numbness or tingling left-sided, paresthesias left-sided and weakness left-sided    OBJECTIVE:                                                    /70 (BP Location: Left arm, Patient Position: Chair, Cuff Size: Adult Regular)  Pulse 90  Temp 97.8  F (36.6  C)  Resp 20  Ht 5' 9.5\" (1.765 m)  Wt 163 lb (73.9 kg)  SpO2 100%  BMI 23.73 kg/m2  Body mass index is 23.73 kg/(m^2).  GENERAL APPEARANCE: alert and clothing is dirty with food stains  RESP: no rales or rhonchi  CV: regular rates and rhythm, normal S1 S2, no S3 or S4 and no murmur, click or rub  NEURO: gait abnormal ; he uses a cane and left hemiparesis  PSYCH: Unusual affect: He has pressured and prolonged speech.             The  does not mention hallucinations or delusions.        Diagnostic test results:  Results for orders placed or performed in visit on 01/23/18   LDL cholesterol direct   Result Value Ref Range    LDL Cholesterol Direct 164 (H) <100 mg/dL   Hemoglobin A1c   Result Value Ref Range    Hemoglobin A1C 10.1 (H) 4.3 - 6.0 %   Basic metabolic panel   Result Value Ref Range    Sodium 136 133 - 144 mmol/L    Potassium 3.9 3.4 - 5.3 mmol/L    " Chloride 106 94 - 109 mmol/L    Carbon Dioxide 25 20 - 32 mmol/L    Anion Gap 5 3 - 14 mmol/L    Glucose 169 (H) 70 - 99 mg/dL    Urea Nitrogen 11 7 - 30 mg/dL    Creatinine 0.60 (L) 0.66 - 1.25 mg/dL    GFR Estimate >90 >60 mL/min/1.7m2    GFR Estimate If Black >90 >60 mL/min/1.7m2    Calcium 8.6 8.5 - 10.1 mg/dL   Albumin Random Urine Quantitative with Creat Ratio   Result Value Ref Range    Creatinine Urine 160 mg/dL    Albumin Urine mg/L 11 mg/L    Albumin Urine mg/g Cr 6.75 0 - 17 mg/g Cr        ASSESSMENT/PLAN:                                                        ICD-10-CM    1. H/O: stroke with residual effects LEFT ARM AND LEG I69.30 clopidogrel (PLAVIX) 75 MG tablet     PHYSICAL THERAPY REFERRAL   2. Type 2 diabetes mellitus, uncontrolled, with neuropathy (H) E11.40     E11.65    3. Hypertension goal BP (blood pressure) < 140/90 I10    4. Gastroesophageal reflux disease without esophagitis K21.9 pantoprazole (PROTONIX) 40 MG EC tablet   5. Hyperlipidemia LDL goal <100 E78.5        Summary and implications:  Multiple issues.      His mental health issues complicate the situation.         I cannot tell from his history if he is having central neuropathic pain due to his previous stroke, versus weakness and paresthesias and incoordination.                  We discussed the risk factors for recurrent stroke.     He has already quit smoking.  He needs to resume his clopidogrel, and get his blood pressure and diabetes and lipid situation under control.            Despite his statements, it does not appear that he is taking his medications as directed or accurately.                 This was a 45 minute visit, over half counseling and coordination of care with the .  Patient Instructions   Your diabetes control is not good.  Be sure to take the glimepiride and the Januvia every day.                       Also, your cholesterol is too high.  Be sure to take the atorvastatin every day.                                      Be sure to go to the neurology appointment and go to physical therapy.                         Also, your blood pressure is too high; take the losartan/hctz, and also the metoprolol 25 mg per day.                                                              See Dr Tavares or me in 3 weeks.          Bring in all of your pill bottles on that visit !!!                       Thad Leal MD  [facility]

## 2018-02-12 NOTE — MR AVS SNAPSHOT
After Visit Summary   2/12/2018    Vikram Rodgers    MRN: [id number]           Patient Information     Date Of Birth          1970        Visit Information        Provider Department      2/12/2018 1:30 PM Thad Leal MD; [employer] [facility]        Today's Diagnoses     H/O: stroke with residual effects LEFT ARM AND LEG    -  1    Type 2 diabetes mellitus, uncontrolled, with neuropathy (H)        Gastroesophageal reflux disease without esophagitis        Hypertension goal BP (blood pressure) < 140/90          Care Instructions    Your diabetes control is not good.  Be sure to take the glimepiride and the Januvia every day.                       Also, your cholesterol is too high.  Be sure to take the atorvastatin every day.                                     Be sure to go to the neurology appointment and go to physical therapy.                         Also, your blood pressure is too high; take the losartan/hctz, and also the metoprolol 25 mg per day.                                                              See Dr Tavares or me in 3 weeks.          Bring in all of your pill bottles on that visit !!!                           Follow-ups after your visit        Additional Services     PHYSICAL THERAPY REFERRAL       Treatment: Evaluation & Treatment  Special Instructions/Modalities: no  Special Programs: None    Please be aware that coverage of these services is subject to the terms and limitations of your health insurance plan.  Call member services at your health plan with any benefit or coverage questions.                  Your next 10 appointments already scheduled     Feb 14, 2018  3:30 PM SAYRA   Neuro Eval with Adrianna Pace, PT   [facility] ([facility])    [address] [place] [address]   [phone]            Feb 16, 2018  7:00 AM CST   (Arrive by  "6:45 AM)   New Patient Visit with Sunny Jackson MD   [facility] Neurology ([facility])    [address]  [address] [facility] [address] [phone]              Who to contact     If you have questions or need follow up information about today's clinic visit or your schedule please contact [facility] directly at [phone].  Normal or non-critical lab and imaging results will be communicated to you by [employer], letter or phone within 4 business days after the clinic has received the results. If you do not hear from us within 7 days, please contact the clinic through [employer] or phone. If you have a critical or abnormal lab result, we will notify you by phone as soon as possible.  Submit refill requests through [employer] or call your pharmacy and they will forward the refill request to us. Please allow 3 business days for your refill to be completed.          Additional Information About Your Visit        [employer] Information     [employer] lets you send messages to your doctor, view your test results, renew your prescriptions, schedule appointments and more. To sign up, go to [email or web address] . Click on \"Log in\" on the left side of the screen, which will take you to the Welcome page. Then click on \"Sign up Now\" on the right side of the page.     You will be asked to enter the access code listed below, as well as some personal information. Please follow the directions to create your username and password.     Your access code is: [email or web address]  Expires: 2018  2:15 PM     Your access code will  in 90 days. If you need help or a new code, please call your [facility] or [phone].        Care EveryWhere ID     This is your Care EveryWhere ID. This could be used by other organizations to access your [place] medical records  [id number]        Your Vitals Were     Pulse Temperature Respirations Height Pulse Oximetry BMI (Body " "Mass Index)    90 97.8  F (36.6  C) 20 5' 9.5\" (1.765 m) 100% 23.73 kg/m2       Blood Pressure from Last 3 Encounters:   02/12/18 144/70   01/23/18 112/64   11/24/17 124/82    Weight from Last 3 Encounters:   02/12/18 163 lb (73.9 kg)   01/23/18 155 lb (70.3 kg)   11/24/17 149 lb (67.6 kg)              We Performed the Following     PHYSICAL THERAPY REFERRAL          Today's Medication Changes          These changes are accurate as of 2/12/18  2:19 PM.  If you have any questions, ask your nurse or doctor.               Stop taking these medicines if you haven't already. Please contact your care team if you have questions.     lisinopril 20 MG tablet   Commonly known as:  PRINIVIL/ZESTRIL   Stopped by:  Thad Leal MD                Where to get your medicines      These medications were sent to [address] [address]     Phone:  [phone]     clopidogrel 75 MG tablet    pantoprazole 40 MG EC tablet                Primary Care Provider Office Phone # Fax #    Hamlet Aury Tavares [phone] [address] [facility] [address]        Equal Access to Services     CECE PICHARDO AH: Hadii aad ku hadasho Soomaali, waaxda luqadaha, qaybta kaalmada adeegyada, javier moore . So [facility] [phone].    ATENCIÓN: Si habla español, tiene a mathur disposición servicios gratuitos de asistencia lingüística. Llame al [phone].    We comply with applicable federal civil rights laws and [place] laws. We do not discriminate on the basis of race, color, national origin, age, disability, sex, sexual orientation, or gender identity.            Thank you!     Thank you for choosing [facility]  for your care. Our goal is always to provide you with excellent care. Hearing back from our patients is one way we can continue to improve our services. Please take a few " minutes to complete the written survey that you may receive in the mail after your visit with us. Thank you!             Your Updated Medication List - Protect others around you: Learn how to safely use, store and throw away your medicines at www.disposemymeds.org.          This list is accurate as of 2/12/18  2:19 PM.  Always use your most recent med list.                   Brand Name Dispense Instructions for use Diagnosis    atorvastatin 20 MG tablet    LIPITOR    90 tablet    Take 1 tablet (20 mg) by mouth daily    Hyperlipidemia LDL goal <100       blood glucose calibration solution     1 Bottle    Use to calibrate blood glucose monitor as directed.    Type 2 diabetes mellitus with diabetic polyneuropathy, without long-term current use of insulin (H)       blood glucose monitoring test strip    no brand specified    100 each    1 strip by In Vitro route daily    Cough       cetirizine 10 MG tablet    zyrTEC    30 tablet    Take 1 tablet (10 mg) by mouth daily    Pruritic disorder       clopidogrel 75 MG tablet    PLAVIX    30 tablet    Take 1 tablet (75 mg) by mouth daily    H/O: stroke with residual effects       DENTA 5000 PLUS 1.1 % Crea   Generic drug:  Sodium Fluoride     51 g    USE FOR DAILY BRUSHING    Tooth pain       diclofenac 1 % Gel topical gel    VOLTAREN    100 g    APPLY 4 GRAMS TO KNEES OR 2 GRAMS TO HANDS BY TOPICAL ROUTE FOUR TIMES DAILY USING ENCLOSED DOSING CARD    Pain in joint, pelvic region and thigh, unspecified laterality       FLUoxetine 20 MG capsule    PROzac    30 capsule    Take 1 capsule (20 mg) by mouth daily    Major depressive disorder, single episode, in partial remission (H)       gabapentin 300 MG capsule    NEURONTIN    90 capsule    Take 1 capsule (300 mg) by mouth 3 times daily    Disturbance of skin sensation       glimepiride 4 MG tablet    AMARYL    60 tablet    Take 1 tablet (4 mg) by mouth two times daily    Type 2 diabetes mellitus with diabetic polyneuropathy,  without long-term current use of insulin (H)       losartan-hydrochlorothiazide 100-12.5 MG per tablet    HYZAAR    30 tablet    Take 1 tablet by mouth daily    Essential hypertension with goal blood pressure less than 140/90       metoprolol succinate 25 MG 24 hr tablet    TOPROL-XL    90 tablet    TAKE 1 TABLET (25 MG) BY MOUTH DAILY    Hypertension goal BP (blood pressure) < 140/90       pantoprazole 40 MG EC tablet    PROTONIX    30 tablet    TAKE 1 TABLET (40 MG) BY MOUTH DAILY, 30-60 MINUTES BEFORE A MEAL.    Gastroesophageal reflux disease without esophagitis       sitagliptin 100 MG tablet    JANUVIA    90 tablet    TAKE 1 TABLET (100 MG) BY MOUTH DAILY    Type 2 diabetes mellitus, uncontrolled, with neuropathy (H)       tamsulosin 0.4 MG capsule    FLOMAX    30 capsule    Take 1 capsule (0.4 mg) by mouth daily    Benign non-nodular prostatic hyperplasia with lower urinary tract symptoms

## 2018-02-12 NOTE — NURSING NOTE
"Chief Complaint   Patient presents with     Consult       Initial /70 (BP Location: Left arm, Patient Position: Chair, Cuff Size: Adult Regular)  Pulse 90  Temp 97.8  F (36.6  C)  Resp 20  Ht 5' 9.5\" (1.765 m)  Wt 163 lb (73.9 kg)  SpO2 100%  BMI 23.73 kg/m2 Estimated body mass index is 23.73 kg/(m^2) as calculated from the following:    Height as of this encounter: 5' 9.5\" (1.765 m).    Weight as of this encounter: 163 lb (73.9 kg).  Medication Reconciliation: complete   Janae Gonzalez LPN  "

## 2018-02-16 ENCOUNTER — OFFICE VISIT (OUTPATIENT)
Dept: NEUROLOGY | Facility: CLINIC | Age: 48
End: 2018-02-16
Attending: FAMILY MEDICINE
Payer: MEDICAID

## 2018-02-16 ENCOUNTER — ALLIED HEALTH/NURSE VISIT (OUTPATIENT)
Dept: CARDIOLOGY | Facility: CLINIC | Age: 48
End: 2018-02-16
Attending: PSYCHIATRY & NEUROLOGY
Payer: MEDICAID

## 2018-02-16 VITALS
BODY MASS INDEX: 23.32 KG/M2 | HEIGHT: 70 IN | DIASTOLIC BLOOD PRESSURE: 94 MMHG | SYSTOLIC BLOOD PRESSURE: 140 MMHG | HEART RATE: 77 BPM | WEIGHT: 162.92 LBS

## 2018-02-16 DIAGNOSIS — I63.431 CEREBRAL INFARCTION DUE TO EMBOLISM OF RIGHT POSTERIOR CEREBRAL ARTERY (H): Primary | ICD-10-CM

## 2018-02-16 DIAGNOSIS — R20.9 DISTURBANCE OF SKIN SENSATION: ICD-10-CM

## 2018-02-16 DIAGNOSIS — I63.431 CEREBRAL INFARCTION DUE TO EMBOLISM OF RIGHT POSTERIOR CEREBRAL ARTERY (H): ICD-10-CM

## 2018-02-16 PROCEDURE — 93227 XTRNL ECG REC<48 HR R&I: CPT | Performed by: INTERNAL MEDICINE

## 2018-02-16 RX ORDER — GABAPENTIN 300 MG/1
300 CAPSULE ORAL 2 TIMES DAILY
Qty: 90 CAPSULE | Refills: 11 | Status: SHIPPED | OUTPATIENT
Start: 2018-02-16 | End: 2018-04-03

## 2018-02-16 ASSESSMENT — PAIN SCALES - GENERAL: PAINLEVEL: EXTREME PAIN (8)

## 2018-02-16 NOTE — LETTER
2018       RE: Vikram Rodgers  [address]  [facility] [address]     Dear Colleague,    Thank you for referring your patient, Vikram Rodgers, to the [facility] NEUROLOGY at [facility]. Please see a copy of my visit note below.    Service Date: 2018      Hamlet Tavares Jr., MD   [facility]   [address], [address]   [place]  [address]      RE: Vikram Rodgers   MRN: [id number]   : 1970      Dear Dr. Tavares:      Vikram Rodgers is a 48-year-old right-handed male referred for evaluation of left-sided numbness and pain.  He is accompanied by an .      Mr. Rodgers suffered a stroke in 2017.  He was hospitalized at [facility].  He presented with 3 days of left-sided weakness and perioral paresthesias.  He was found to have a right posterior cerebral artery occlusion.  There was a concern that this may have been a cardioembolic stroke.  He did have a negative echo and transesophageal echo.  He did not have any prolonged EKG monitoring.  He was started on a statin and combination of aspirin and Plavix.  It was also noted that he had diabetes and hypertension and treatment for those were initiated.  Apparently he did receive some therapy following the stroke but no longer is receiving therapy.  It appears he did not follow up in the Stroke Clinic at [facility] and may have run out of his medications, but recently saw Dr. Thad Leal for reinitiation of treatment for hypertension, hyperlipidemia and diabetes.      Since the stroke, he has had left-sided numbness, heaviness and pain.  It does not sound like he necessarily has had any new events.  He tells me his symptoms are exacerbated when he is exposed to smoke.  He was prescribed gabapentin recently to help with the pain that he is experiencing.  He has been inconsistent with this.       It is notable that he had involvement of the right internal capsule, thalamus, corpus callosum and right medial temporal lobe when he had a brain MRI scan done at the time of the acute stroke.      As noted above, he does have type 2 diabetes, hyperlipidemia, hypertension and in the notes it indicates he has a diagnosis of schizophrenia.      CURRENT MEDICATIONS:  According to the patient are:    1.  Atorvastatin.   2.  Plavix 75 mg.   3.  Voltaren.   4.  Amaryl.   5.  Hyzaar.   6.  Metoprolol.   7.  Januvia.   8.  Flomax.     9.  He has gabapentin 300 mg 3 times a day ordered but he has been inconsistent with that.      ALLERGIES:  He has no medical allergies.      SOCIAL HISTORY:  He lives alone.  He tells me he does not smoke any longer and does not use alcohol.      PHYSICAL EXAMINATION:  Reveals a patient who is alert and cooperative.  Heart rate 77.  Pulse is regular.  Blood pressure 140/94.      Pupils are equal, round and react well to light.  Visual fields are intact.  There is a very slight flattening of the left nasolabial fold.  Otherwise, cranial nerves II-XII are intact.  Motor examination reveals weakness of the left arm and left leg.  There may be some poor effort on muscle testing, but he clearly does have some degree of left hemiparesis.  On sensory testing, he reports an alteration of touch involving the left face, left arm and left leg.  There is no loss of position or vibratory sense.  Pinprick is intact.  Finger-to-nose is done well on the right.  It is difficult on the left due to left arm weakness.  He ambulates with a cane and has a slight hemiparetic gait.  Reflexes are 1-2+ in the upper extremities and at the knees and are brisker on the left.  Right ankle jerk is absent, left is 2+.  Right plantar response is flexor, left is extensor.      IMPRESSION:   1.  History of right posterior cerebral artery stroke in 02/2017.   2.  Residual left hemiparesis, sensory deficit and post-stroke  "pain.   3.  Multiple risk factors for vascular disease.      PLAN:  I cannot see where he ever had any extended cardiac monitoring done after the stroke, and given the concern that this may have been a cardioembolic stroke, I am going to have him set up for a 14-day Zio Patch monitor.      I explained what had happened to him and why he has the current problems.  I suggested he try the gabapentin consistently to see if it helps with some of the post-stroke pain which can happen particularly after thalamic strokes.  He tells me he thought when he first started on 300 mg 3 times a day it made his \"numbness\" worse so I have asked him to start out just taking 300 mg twice a day.      I am going to refer him to physical therapy.      I do plan to see him back in a few weeks.      Sincerely,          MARY KAY HASSAN MD           cc:   Thad Leal MD   [address]  [address]         D: 2018   T: 2018   MT: PIERCE      Name:     GIGI MAX   MRN:      [id number]        Account:      [id number]   :      1970           Service Date: 2018      Document: [id number]              "

## 2018-02-16 NOTE — LETTER
2018       RE: Vikram Rodgers  [address]  [facility] [address]     Dear Colleague,    Thank you for referring your patient, Vikram Rodgers, to the [facility] NEUROLOGY at [facility]. Please see a copy of my visit note below.    Service Date: 2018      Hamlet Tavares Jr., MD   [facility]   [address], [address]   [place]  [address]      RE: Vikram Rodgers   MRN: [id number]   : 1970      Dear Dr. Tavares:      Vikram Rodgers is a 48-year-old right-handed male referred for evaluation of left-sided numbness and pain.  He is accompanied by an .      Mr. Rodgers suffered a stroke in 2017.  He was hospitalized at [facility].  He presented with 3 days of left-sided weakness and perioral paresthesias.  He was found to have a right posterior cerebral artery occlusion.  There was a concern that this may have been a cardioembolic stroke.  He did have a negative echo and transesophageal echo.  He did not have any prolonged EKG monitoring.  He was started on a statin and combination of aspirin and Plavix.  It was also noted that he had diabetes and hypertension and treatment for those were initiated.  Apparently he did receive some therapy following the stroke but no longer is receiving therapy.  It appears he did not follow up in the Stroke Clinic at [facility] and may have run out of his medications, but recently saw Dr. Thad Leal for reinitiation of treatment for hypertension, hyperlipidemia and diabetes.      Since the stroke, he has had left-sided numbness, heaviness and pain.  It does not sound like he necessarily has had any new events.  He tells me his symptoms are exacerbated when he is exposed to smoke.  He was prescribed gabapentin recently to help with the pain that he is experiencing.  He has been inconsistent with this.       It is notable that he had involvement of the right internal capsule, thalamus, corpus callosum and right medial temporal lobe when he had a brain MRI scan done at the time of the acute stroke.      As noted above, he does have type 2 diabetes, hyperlipidemia, hypertension and in the notes it indicates he has a diagnosis of schizophrenia.      CURRENT MEDICATIONS:  According to the patient are:    1.  Atorvastatin.   2.  Plavix 75 mg.   3.  Voltaren.   4.  Amaryl.   5.  Hyzaar.   6.  Metoprolol.   7.  Januvia.   8.  Flomax.     9.  He has gabapentin 300 mg 3 times a day ordered but he has been inconsistent with that.      ALLERGIES:  He has no medical allergies.      SOCIAL HISTORY:  He lives alone.  He tells me he does not smoke any longer and does not use alcohol.      PHYSICAL EXAMINATION:  Reveals a patient who is alert and cooperative.  Heart rate 77.  Pulse is regular.  Blood pressure 140/94.      Pupils are equal, round and react well to light.  Visual fields are intact.  There is a very slight flattening of the left nasolabial fold.  Otherwise, cranial nerves II-XII are intact.  Motor examination reveals weakness of the left arm and left leg.  There may be some poor effort on muscle testing, but he clearly does have some degree of left hemiparesis.  On sensory testing, he reports an alteration of touch involving the left face, left arm and left leg.  There is no loss of position or vibratory sense.  Pinprick is intact.  Finger-to-nose is done well on the right.  It is difficult on the left due to left arm weakness.  He ambulates with a cane and has a slight hemiparetic gait.  Reflexes are 1-2+ in the upper extremities and at the knees and are brisker on the left.  Right ankle jerk is absent, left is 2+.  Right plantar response is flexor, left is extensor.      IMPRESSION:   1.  History of right posterior cerebral artery stroke in 02/2017.   2.  Residual left hemiparesis, sensory deficit and post-stroke  "pain.   3.  Multiple risk factors for vascular disease.      PLAN:  I cannot see where he ever had any extended cardiac monitoring done after the stroke, and given the concern that this may have been a cardioembolic stroke, I am going to have him set up for a 14-day Zio Patch monitor.      I explained what had happened to him and why he has the current problems.  I suggested he try the gabapentin consistently to see if it helps with some of the post-stroke pain which can happen particularly after thalamic strokes.  He tells me he thought when he first started on 300 mg 3 times a day it made his \"numbness\" worse so I have asked him to start out just taking 300 mg twice a day.      I am going to refer him to physical therapy.      I do plan to see him back in a few weeks.      Sincerely,      cc:   [place] [address]        D: 2018   T: 2018   MT: PIERCE      Name:     GIGI MAX   MRN:      [id number]        Account:      [id number]   :      1970           Service Date: 2018      Document: [id number]        Again, thank you for allowing me to participate in the care of your patient.      Sincerely,    Sunny Jackson MD      "

## 2018-02-16 NOTE — MR AVS SNAPSHOT
"              After Visit Summary   2/16/2018    Vikram Rodgers    MRN: [id number]           Patient Information     Date Of Birth          1970        Visit Information        Provider Department      2/16/2018 8:00 AM Tech, U[facility] Cvc Monitor, [facility]        Today's Diagnoses     Hemiparesis of left nondominant side due to cerebral infarction (H)        Cerebral infarction due to embolism of right posterior cerebral artery (H)           Follow-ups after your visit        Your next 10 appointments already scheduled     Apr 02, 2018  9:30 AM CDT   (Arrive by 9:15 AM)   Return Visit with Sunny Jackson MD   [facility] ([facility])    [address]  [address] [facility] [address] [phone]              Who to contact     If you have questions or need follow up information about today's clinic visit or your schedule please contact [facility] directly at [phone].  Normal or non-critical lab and imaging results will be communicated to you by Winkapphart, letter or phone within 4 business days after the clinic has received the results. If you do not hear from us within 7 days, please contact the clinic through Winkapphart or phone. If you have a critical or abnormal lab result, we will notify you by phone as soon as possible.  Submit refill requests through SunnyBump or call your pharmacy and they will forward the refill request to us. Please allow 3 business days for your refill to be completed.          Additional Information About Your Visit        WinkappharOncimmune Information     SunnyBump lets you send messages to your doctor, view your test results, renew your prescriptions, schedule appointments and more. To sign up, go to [email or web address] . Click on \"Log in\" on the left side of the screen, which will take you to the Welcome page. Then click on \"Sign up Now\" on the right side of the page.     You will be asked to enter the access code listed " below, as well as some personal information. Please follow the directions to create your username and password.     Your access code is: [email or web address]  Expires: 2018  2:15 PM     Your access code will  in 90 days. If you need help or a new code, please call your [place] clinic or [phone].        Care EveryWhere ID     This is your Care EveryWhere ID. This could be used by other organizations to access your [place] medical records  [id number]         Blood Pressure from Last 3 Encounters:   18 (!) 140/94   18 144/70   18 112/64    Weight from Last 3 Encounters:   18 73.9 kg (162 lb 14.7 oz)   18 73.9 kg (163 lb)   18 70.3 kg (155 lb)              We Performed the Following     Ziopatch Holter Monitor          Today's Medication Changes          These changes are accurate as of 18  9:43 AM.  If you have any questions, ask your nurse or doctor.               These medicines have changed or have updated prescriptions.        Dose/Directions    gabapentin 300 MG capsule   Commonly known as:  NEURONTIN   This may have changed:  when to take this   Used for:  Disturbance of skin sensation   Changed by:  Sunny Jackson MD        Dose:  300 mg   Take 1 capsule (300 mg) by mouth 2 times daily   Quantity:  90 capsule   Refills:  11            Where to get your medicines      These medications were sent to [facility] [address] [place] [facility] [address] [facility]  [address] [facility] [address]     Phone:  [phone]     gabapentin 300 MG capsule                Primary Care Provider Office Phone # Fax #    Ham[place] Aury Tavares [phone]       [address] [facility] [address]        Equal Access to Services     CECE PICHARDO : Estella Jean, waelvira salmeron, qaybta kaalmada vladislav, javier harrison. So [facility] [phone].    ATENCIÓN: Si habla español, tiene a mathur disposición servicios  yeny de asistencia lingüística. Rex saldivar [phone].    We comply with applicable federal civil rights laws and [place] laws. We do not discriminate on the basis of race, color, national origin, age, disability, sex, sexual orientation, or gender identity.            Thank you!     Thank you for choosing [facility]  for your care. Our goal is always to provide you with excellent care. Hearing back from our patients is one way we can continue to improve our services. Please take a few minutes to complete the written survey that you may receive in the mail after your visit with us. Thank you!             Your Updated Medication List - Protect others around you: Learn how to safely use, store and throw away your medicines at www.disposemymeds.org.          This list is accurate as of 2/16/18  9:43 AM.  Always use your most recent med list.                   Brand Name Dispense Instructions for use Diagnosis    atorvastatin 20 MG tablet    LIPITOR    90 tablet    Take 1 tablet (20 mg) by mouth daily    Hyperlipidemia LDL goal <100       blood glucose calibration solution     1 Bottle    Use to calibrate blood glucose monitor as directed.    Type 2 diabetes mellitus with diabetic polyneuropathy, without long-term current use of insulin (H)       blood glucose monitoring test strip    no brand specified    100 each    1 strip by In Vitro route daily    Cough       clopidogrel 75 MG tablet    PLAVIX    30 tablet    Take 1 tablet (75 mg) by mouth daily    H/O: stroke with residual effects       DENTA 5000 PLUS 1.1 % Crea   Generic drug:  Sodium Fluoride     51 g    USE FOR DAILY BRUSHING    Tooth pain       diclofenac 1 % Gel topical gel    VOLTAREN    100 g    APPLY 4 GRAMS TO KNEES OR 2 GRAMS TO HANDS BY TOPICAL ROUTE FOUR TIMES DAILY USING ENCLOSED DOSING CARD    Pain in joint, pelvic region and thigh, unspecified laterality       gabapentin 300 MG capsule    NEURONTIN    90 capsule    Take 1 capsule  (300 mg) by mouth 2 times daily    Disturbance of skin sensation       glimepiride 4 MG tablet    AMARYL    60 tablet    Take 1 tablet (4 mg) by mouth two times daily    Type 2 diabetes mellitus with diabetic polyneuropathy, without long-term current use of insulin (H)       losartan-hydrochlorothiazide 100-12.5 MG per tablet    HYZAAR    30 tablet    Take 1 tablet by mouth daily    Essential hypertension with goal blood pressure less than 140/90       metoprolol succinate 25 MG 24 hr tablet    TOPROL-XL    90 tablet    TAKE 1 TABLET (25 MG) BY MOUTH DAILY    Hypertension goal BP (blood pressure) < 140/90       sitagliptin 100 MG tablet    JANUVIA    90 tablet    TAKE 1 TABLET (100 MG) BY MOUTH DAILY    Type 2 diabetes mellitus, uncontrolled, with neuropathy (H)       tamsulosin 0.4 MG capsule    FLOMAX    30 capsule    Take 1 capsule (0.4 mg) by mouth daily    Benign non-nodular prostatic hyperplasia with lower urinary tract symptoms

## 2018-02-16 NOTE — MR AVS SNAPSHOT
"              After Visit Summary   2/16/2018    Vikram Rodgers    MRN: [id number]           Patient Information     Date Of Birth          1970        Visit Information        Provider Department      2/16/2018 6:45 AM Sunny Jackson MD; [facility] SERVICES [facility] Neurology        Today's Diagnoses     Cerebral infarction due to embolism of right posterior cerebral artery (H)    -  1    Disturbance of skin sensation        Hemiparesis of left nondominant side due to cerebral infarction (H)           Follow-ups after your visit        Additional Services     PHYSICAL THERAPY REFERRAL       *This therapy referral will be filtered to a centralized scheduling office at [facility] and the patient will receive a call to schedule an appointment at a [facility] location most convenient for them. *     [facility] provides Physical Therapy evaluation and treatment and many specialty services across the [facility] system.  If requesting a specialty program, please choose from the list below.    If you have not heard from the scheduling office within 2 business days, please call [phone] for all locations, with the exception of [place], please call [phone].  Treatment: Evaluation & Treatment  Special Instructions/Modalities: Evaluate and treat  Special Programs: None    Please be aware that coverage of these services is subject to the terms and limitations of your health insurance plan.  Call member services at your health plan with any benefit or coverage questions.      **Note to Provider:  If you are referring outside of [facility] for the therapy appointment, please list the name of the location in the \"special instructions\" above, print the referral and give to the patient to schedule the appointment.                  Follow-up notes from your care team     Discussed this visit Return in about 6 weeks (around 3/30/2018).      Your next 10 appointments " "already scheduled     2018  9:30 AM CDT   (Arrive by 9:15 AM)   Return Visit with Sunny Jackson MD   [facility] ([facility])    [address] [address] [phone]              Who to contact     Please call your clinic at [phone] to:    Ask questions about your health    Make or cancel appointments    Discuss your medicines    Learn about your test results    Speak to your doctor            Additional Information About Your Visit        [email or web address] Information     LoveThis is an electronic gateway that provides easy, online access to your medical records. With LoveThis, you can request a clinic appointment, read your test results, renew a prescription or communicate with your care team.     To sign up for LoveThis visit the website at [email or web address]   You will be asked to enter the access code listed below, as well as some personal information. Please follow the directions to create your username and password.     Your access code is: [email or web address]  Expires: 2018  2:15 PM     Your access code will  in 90 days. If you need help or a new code, please contact your [facility] or call [phone] for assistance.        Care EveryWhere ID     This is your Care EveryWhere ID. This could be used by other organizations to access your [place] medical records  [id number]        Your Vitals Were     Pulse Height BMI (Body Mass Index)             77 1.765 m (5' 9.5\") 23.71 kg/m2          Blood Pressure from Last 3 Encounters:   18 (!) 140/94   18 144/70   18 112/64    Weight from Last 3 Encounters:   18 73.9 kg (162 lb 14.7 oz)   18 73.9 kg (163 lb)   18 70.3 kg (155 lb)              We Performed the Following     PHYSICAL THERAPY REFERRAL          Today's Medication Changes          These changes are accurate as of 18  8:50 AM.  If you have any " questions, ask your nurse or doctor.               These medicines have changed or have updated prescriptions.        Dose/Directions    gabapentin 300 MG capsule   Commonly known as:  NEURONTIN   This may have changed:  when to take this   Used for:  Disturbance of skin sensation   Changed by:  Sunny Jackson MD        Dose:  300 mg   Take 1 capsule (300 mg) by mouth 2 times daily   Quantity:  90 capsule   Refills:  11            Where to get your medicines      These medications were sent to [place] [address] [place] [facility] [address] [facility]  [address] [facility] [address]     Phone:  [phone]     gabapentin 300 MG capsule                Primary Care Provider Office Phone # Fax #    Hamlet Aury Tavares [phone] [address] [facility] [address]        Equal Access to Services     CECE [facility] Hospital[facility]RAHAN : Estella barber Soubaldo, waaxda luqadaha, qaybta kaalmada adeegyada, javier moore . So [facility] [phone].    ATENCIÓN: Si habla español, tiene a mathur disposición servicios gratuitos de asistencia lingüística. [facility] [phone].    We comply with applicable federal civil rights laws and [place] laws. We do not discriminate on the basis of race, color, national origin, age, disability, sex, sexual orientation, or gender identity.            Thank you!     Thank you for choosing [facility]  for your care. Our goal is always to provide you with excellent care. Hearing back from our patients is one way we can continue to improve our services. Please take a few minutes to complete the written survey that you may receive in the mail after your visit with us. Thank you!             Your Updated Medication List - Protect others around you: Learn how to safely use, store and throw away your medicines at www.disposemymeds.org.          This list is accurate as of 2/16/18  8:50 AM.  Always use your most recent med list.                    Brand Name Dispense Instructions for use Diagnosis    atorvastatin 20 MG tablet    LIPITOR    90 tablet    Take 1 tablet (20 mg) by mouth daily    Hyperlipidemia LDL goal <100       blood glucose calibration solution     1 Bottle    Use to calibrate blood glucose monitor as directed.    Type 2 diabetes mellitus with diabetic polyneuropathy, without long-term current use of insulin (H)       blood glucose monitoring test strip    no brand specified    100 each    1 strip by In Vitro route daily    Cough       clopidogrel 75 MG tablet    PLAVIX    30 tablet    Take 1 tablet (75 mg) by mouth daily    H/O: stroke with residual effects       DENTA 5000 PLUS 1.1 % Crea   Generic drug:  Sodium Fluoride     51 g    USE FOR DAILY BRUSHING    Tooth pain       diclofenac 1 % Gel topical gel    VOLTAREN    100 g    APPLY 4 GRAMS TO KNEES OR 2 GRAMS TO HANDS BY TOPICAL ROUTE FOUR TIMES DAILY USING ENCLOSED DOSING CARD    Pain in joint, pelvic region and thigh, unspecified laterality       gabapentin 300 MG capsule    NEURONTIN    90 capsule    Take 1 capsule (300 mg) by mouth 2 times daily    Disturbance of skin sensation       glimepiride 4 MG tablet    AMARYL    60 tablet    Take 1 tablet (4 mg) by mouth two times daily    Type 2 diabetes mellitus with diabetic polyneuropathy, without long-term current use of insulin (H)       losartan-hydrochlorothiazide 100-12.5 MG per tablet    HYZAAR    30 tablet    Take 1 tablet by mouth daily    Essential hypertension with goal blood pressure less than 140/90       metoprolol succinate 25 MG 24 hr tablet    TOPROL-XL    90 tablet    TAKE 1 TABLET (25 MG) BY MOUTH DAILY    Hypertension goal BP (blood pressure) < 140/90       sitagliptin 100 MG tablet    JANUVIA    90 tablet    TAKE 1 TABLET (100 MG) BY MOUTH DAILY    Type 2 diabetes mellitus, uncontrolled, with neuropathy (H)       tamsulosin 0.4 MG capsule    FLOMAX    30 capsule    Take 1 capsule (0.4 mg) by mouth daily     Benign non-nodular prostatic hyperplasia with lower urinary tract symptoms

## 2018-02-16 NOTE — NURSING NOTE
Per Dr. Sunny fairchild, patient to have Zio Patch 14 day monitor placed.  Diagnosis: Cerebral infarction due to embolism of right posterior cerebral atery  Monitor placed: Yes  Patient Instructed: Yes  Patient verbalized understanding: Yes  Holter # [id number]    Placed By Irais GARCIA

## 2018-02-16 NOTE — PROGRESS NOTES
Service Date: 2018      Hamlet Tavares Jr., MD   [facility]   [address], [address]   [place]  [address]      RE: Vikram Rodgers   MRN: [id number]   : 1970      Dear Dr. Tavares:      Vikram Rodgers is a 48-year-old right-handed male referred for evaluation of left-sided numbness and pain.  He is accompanied by an .      Mr. Rodgers suffered a stroke in 2017.  He was hospitalized at [facility].  He presented with 3 days of left-sided weakness and perioral paresthesias.  He was found to have a right posterior cerebral artery occlusion.  There was a concern that this may have been a cardioembolic stroke.  He did have a negative echo and transesophageal echo.  He did not have any prolonged EKG monitoring.  He was started on a statin and combination of aspirin and Plavix.  It was also noted that he had diabetes and hypertension and treatment for those were initiated.  Apparently he did receive some therapy following the stroke but no longer is receiving therapy.  It appears he did not follow up in the Stroke Clinic at [facility] and may have run out of his medications, but recently saw Dr. Thad Leal for reinitiation of treatment for hypertension, hyperlipidemia and diabetes.      Since the stroke, he has had left-sided numbness, heaviness and pain.  It does not sound like he necessarily has had any new events.  He tells me his symptoms are exacerbated when he is exposed to smoke.  He was prescribed gabapentin recently to help with the pain that he is experiencing.  He has been inconsistent with this.      It is notable that he had involvement of the right internal capsule, thalamus, corpus callosum and right medial temporal lobe when he had a brain MRI scan done at the time of the acute stroke.      As noted above, he does have type 2 diabetes, hyperlipidemia, hypertension and in the notes  it indicates he has a diagnosis of schizophrenia.      CURRENT MEDICATIONS:  According to the patient are:    1.  Atorvastatin.   2.  Plavix 75 mg.   3.  Voltaren.   4.  Amaryl.   5.  Hyzaar.   6.  Metoprolol.   7.  Januvia.   8.  Flomax.     9.  He has gabapentin 300 mg 3 times a day ordered but he has been inconsistent with that.      ALLERGIES:  He has no medical allergies.      SOCIAL HISTORY:  He lives alone.  He tells me he does not smoke any longer and does not use alcohol.      PHYSICAL EXAMINATION:  Reveals a patient who is alert and cooperative.  Heart rate 77.  Pulse is regular.  Blood pressure 140/94.      Pupils are equal, round and react well to light.  Visual fields are intact.  There is a very slight flattening of the left nasolabial fold.  Otherwise, cranial nerves II-XII are intact.  Motor examination reveals weakness of the left arm and left leg.  There may be some poor effort on muscle testing, but he clearly does have some degree of left hemiparesis.  On sensory testing, he reports an alteration of touch involving the left face, left arm and left leg.  There is no loss of position or vibratory sense.  Pinprick is intact.  Finger-to-nose is done well on the right.  It is difficult on the left due to left arm weakness.  He ambulates with a cane and has a slight hemiparetic gait.  Reflexes are 1-2+ in the upper extremities and at the knees and are brisker on the left.  Right ankle jerk is absent, left is 2+.  Right plantar response is flexor, left is extensor.      IMPRESSION:   1.  History of right posterior cerebral artery stroke in 02/2017.   2.  Residual left hemiparesis, sensory deficit and post-stroke pain.   3.  Multiple risk factors for vascular disease.      PLAN:  I cannot see where he ever had any extended cardiac monitoring done after the stroke, and given the concern that this may have been a cardioembolic stroke, I am going to have him set up for a 14-day Zio Patch monitor.      I  "explained what had happened to him and why he has the current problems.  I suggested he try the gabapentin consistently to see if it helps with some of the post-stroke pain which can happen particularly after thalamic strokes.  He tells me he thought when he first started on 300 mg 3 times a day it made his \"numbness\" worse so I have asked him to start out just taking 300 mg twice a day.      I am going to refer him to physical therapy.      I do plan to see him back in a few weeks.      Sincerely,      cc:   [place] [address]         MARY KAY HASSAN MD             D: 2018   T: 2018   MT: PIERCE      Name:     GIGI MAX   MRN:      [id number]        Account:      [id number]   :      1970           Service Date: 2018      Document: [id number]      "

## 2018-02-20 ENCOUNTER — CARE COORDINATION (OUTPATIENT)
Dept: NEUROLOGY | Facility: CLINIC | Age: 48
End: 2018-02-20

## 2018-02-20 ENCOUNTER — TELEPHONE (OUTPATIENT)
Dept: CARE COORDINATION | Facility: CLINIC | Age: 48
End: 2018-02-20

## 2018-02-20 NOTE — PROGRESS NOTES
Katina Dyson, Rosemarie Fischer; Sunny Jackson MD; Lottie Naylor, LEONIDAS; Ivania Song, LEONIDAS; Daniela Leal, [facility],     Can you please assist in getting patient transportation to PT?     Thanks,   Serafin   (covering for Lottie)            Previous Messages       ----- Message -----      From: Rosemarie Shipman      Sent: 2/19/2018  11:44 AM        To: Ivania Song, LEONIDAS, Sunny Jackson MD, *   Subject: RE: patient states he is homebound               If he isn't totally homebound then he may not qualify for homecare. His main concern is transportation- is there a care coordinator that can assist him with getting transportation set up so that he can get to his appointments?       Thanks!     ----- Message -----      From: Sunny Jackson MD      Sent: 2/19/2018  11:27 AM        To: Ivania Summers RN, *   Subject: RE: patient states he is homebound               Please help. How to order Home PT. He has a deficit but not clearly confined to home.   ----- Message -----      From: Rosemarie Shipman      Sent: 2/16/2018   3:58 PM        To: Sunny Jackson MD   Subject: patient states he is homebound                   Hi Dr. Jackson,   We called this patient to schedule outpatient PT, but he stated that he was homebound and was unable to come to our clinic. Does he qualify for homecare? If so, would you be willing to enter a homecare PT referral instead?     Thank you!   [facility], Central Scheduling Team

## 2018-02-20 NOTE — TELEPHONE ENCOUNTER
Social Work Telephone Message Note  [facility]    Patient Name:  Vikram Rodgers  /Age:  1970 (48 year old)    Referral Source:  Katina Dyson RN  Reason for Referral:  Pt needs transportation to/from outpatient Physical Therapy appts, that need to be set up    SW received referral to follow up with pt regarding transportation resource to take him to/from outpatient physical therapy appts.  SW contacted pt via phone today with [place]  and explained that SW would have [facility] call pt to set up outpatient physical therapy appts.  SW educated pt regarding the availability of [facility] transportation, [phone], to take pt to/from appts as long as pt called 3 days prior to the appts.  It was at this time during the call that pt stopped talking.   and this SW could hear background noise though pt wasn't there.  SW attempted to call pt back though call went to voice mail.  SW will follow and reach out to pt again on 18 as SW not in clinic tomorrow.    STAN Mccallum, for Daniela Leal     Social Work  [facility]  Office:  [phone]  E-Mail:  [email or web address]  2018 3:24 PM

## 2018-03-18 DIAGNOSIS — G89.29 CHRONIC RADICULAR LOW BACK PAIN: ICD-10-CM

## 2018-03-18 DIAGNOSIS — M54.16 CHRONIC RADICULAR LOW BACK PAIN: ICD-10-CM

## 2018-03-19 RX ORDER — ACETAMINOPHEN 650 MG/1
TABLET, FILM COATED, EXTENDED RELEASE ORAL
Qty: 270 TABLET | Refills: 0 | Status: SHIPPED | OUTPATIENT
Start: 2018-03-19 | End: 2018-04-03

## 2018-03-19 NOTE — TELEPHONE ENCOUNTER
"Requested Prescriptions   Pending Prescriptions Disp Refills     MAPAP ARTHRITIS PAIN 650 MG CR tablet [Pharmacy Med Name: MAPAP ARTHRITIS  MG CPLT]  Last Written Prescription Date:  6/9/17  Last Fill Quantity: 90,  # refills: 11   Last office visit: 2/12/2018 with prescribing provider:  Mel   Future Office Visit:     270 tablet 0     Sig: TAKE 1 TABLET (650 MG) BY MOUTH 3 TIMES DAILY    Analgesics (Non-Narcotic Tylenol and ASA Only) Passed    3/18/2018  5:37 PM       Passed - Recent (12 mo) or future (30 days) visit within the authorizing provider's specialty    Patient had office visit in the last 12 months or has a visit in the next 30 days with authorizing provider or within the authorizing provider's specialty.  See \"Patient Info\" tab in inbasket, or \"Choose Columns\" in Meds & Orders section of the refill encounter.           Passed - Patient is 7 months old or older    If patient is a peds patient of the age 7 mos -12 years, ok to refill using weight-based dosing.     If >3g daily and/or sig is not \"prn\", check for liver enzymes. If normal in the last year, ok to refill.  If not, refer to the provider.            "

## 2018-03-23 RX ORDER — SITAGLIPTIN 100 MG/1
TABLET, FILM COATED ORAL
Qty: 90 TABLET | Refills: 0 | Status: SHIPPED | OUTPATIENT
Start: 2018-03-23 | End: 2018-04-03

## 2018-03-23 NOTE — TELEPHONE ENCOUNTER
"Last OV 02/12/2018   Routing refill request to provider for review/approval because:  Labs out of range:  CR, LDL and A1C          Requested Prescriptions   Pending Prescriptions Disp Refills     JANUVIA 100 MG tablet [Pharmacy Med Name: JANUVIA 100 MG TABLET] 90 tablet 0     Sig: TAKE 1 TABLET (100 MG) BY MOUTH DAILY    DPP4 Inhibitors Protocol Failed    3/22/2018  4:11 PM       Failed - Blood pressure less than 140/90 in past 6 months    BP Readings from Last 3 Encounters:   02/16/18 (!) 140/94   02/12/18 144/70   01/23/18 112/64                Failed - Normal serum creatinine in past 12 months    Recent Labs   Lab Test  01/23/18   1418   CR  0.60*            Passed - LDL on file in past 12 months    Recent Labs   Lab Test  01/23/18   1418   LDL  164*            Passed - Microalbumin on file in past 12 months    Recent Labs   Lab Test  01/23/18   1425   09/28/11   1654   [id number]   --    --   8.7   [id number]   --    --   4.3   MICROL  11   < >   --    UMALCR  6.75   < >   --     < > = values in this interval not displayed.            Passed - HgbA1C in past 3 or 6 months    Recent Labs   Lab Test  01/23/18   1418   A1C  10.1*            Passed - Patient is age 18 or older       Passed - Recent (6 mo) or future (30 days) visit within the authorizing provider's specialty    Patient had office visit in the last 6 months or has a visit in the next 30 days with authorizing provider.  See \"Patient Info\" tab in inbasket, or \"Choose Columns\" in Meds & Orders section of the refill encounter.              "

## 2018-04-02 ENCOUNTER — TELEPHONE (OUTPATIENT)
Dept: NEUROLOGY | Facility: CLINIC | Age: 48
End: 2018-04-02

## 2018-04-02 NOTE — TELEPHONE ENCOUNTER
Patient missed follow up today. He had 14 day ZioPatch placed but probably not sent in. I attempted to explain this to him by phone but limited conversation due to language barrier. He does have an appointment with Dr Tavares tomorrow and he is aware of this. I sent email to Dr Tavares to enlist his help with this

## 2018-04-03 ENCOUNTER — OFFICE VISIT (OUTPATIENT)
Dept: FAMILY MEDICINE | Facility: CLINIC | Age: 48
End: 2018-04-03
Payer: MEDICAID

## 2018-04-03 VITALS
OXYGEN SATURATION: 100 % | SYSTOLIC BLOOD PRESSURE: 136 MMHG | TEMPERATURE: 97.5 F | RESPIRATION RATE: 20 BRPM | HEART RATE: 86 BPM | BODY MASS INDEX: 24.02 KG/M2 | DIASTOLIC BLOOD PRESSURE: 74 MMHG | WEIGHT: 165 LBS

## 2018-04-03 DIAGNOSIS — R44.3 HALLUCINATIONS: ICD-10-CM

## 2018-04-03 DIAGNOSIS — N40.1 BENIGN NON-NODULAR PROSTATIC HYPERPLASIA WITH LOWER URINARY TRACT SYMPTOMS: ICD-10-CM

## 2018-04-03 DIAGNOSIS — N40.1 BENIGN PROSTATIC HYPERPLASIA WITH URINARY FREQUENCY: ICD-10-CM

## 2018-04-03 DIAGNOSIS — E11.42 TYPE 2 DIABETES MELLITUS WITH DIABETIC POLYNEUROPATHY, WITHOUT LONG-TERM CURRENT USE OF INSULIN (H): ICD-10-CM

## 2018-04-03 DIAGNOSIS — R05.9 COUGH: ICD-10-CM

## 2018-04-03 DIAGNOSIS — J42 CHRONIC BRONCHITIS, UNSPECIFIED CHRONIC BRONCHITIS TYPE (H): Chronic | ICD-10-CM

## 2018-04-03 DIAGNOSIS — G89.29 CHRONIC RADICULAR LOW BACK PAIN: ICD-10-CM

## 2018-04-03 DIAGNOSIS — K08.89 TOOTH PAIN: ICD-10-CM

## 2018-04-03 DIAGNOSIS — E78.49 OTHER HYPERLIPIDEMIA: ICD-10-CM

## 2018-04-03 DIAGNOSIS — Z71.84 COUNSELING FOR TRAVEL: ICD-10-CM

## 2018-04-03 DIAGNOSIS — I10 HYPERTENSION GOAL BP (BLOOD PRESSURE) < 140/90: Chronic | ICD-10-CM

## 2018-04-03 DIAGNOSIS — R20.9 DISTURBANCE OF SKIN SENSATION: ICD-10-CM

## 2018-04-03 DIAGNOSIS — R35.0 BENIGN PROSTATIC HYPERPLASIA WITH URINARY FREQUENCY: ICD-10-CM

## 2018-04-03 DIAGNOSIS — M25.559 PAIN IN JOINT, PELVIC REGION AND THIGH, UNSPECIFIED LATERALITY: ICD-10-CM

## 2018-04-03 DIAGNOSIS — F20.0 PARANOID SCHIZOPHRENIA (H): ICD-10-CM

## 2018-04-03 DIAGNOSIS — I10 ESSENTIAL HYPERTENSION WITH GOAL BLOOD PRESSURE LESS THAN 140/90: ICD-10-CM

## 2018-04-03 DIAGNOSIS — M54.16 CHRONIC RADICULAR LOW BACK PAIN: ICD-10-CM

## 2018-04-03 DIAGNOSIS — E78.5 HYPERLIPIDEMIA LDL GOAL <100: Chronic | ICD-10-CM

## 2018-04-03 DIAGNOSIS — I69.30 H/O: STROKE WITH RESIDUAL EFFECTS: Primary | Chronic | ICD-10-CM

## 2018-04-03 DIAGNOSIS — F32.4 MAJOR DEPRESSIVE DISORDER WITH SINGLE EPISODE, IN PARTIAL REMISSION (H): Chronic | ICD-10-CM

## 2018-04-03 LAB — HBA1C MFR BLD: 11.6 % (ref 0–6.4)

## 2018-04-03 PROCEDURE — 83036 HEMOGLOBIN GLYCOSYLATED A1C: CPT | Performed by: FAMILY MEDICINE

## 2018-04-03 PROCEDURE — 99214 OFFICE O/P EST MOD 30 MIN: CPT | Performed by: FAMILY MEDICINE

## 2018-04-03 PROCEDURE — T1013 SIGN LANG/ORAL INTERPRETER: HCPCS | Mod: U3 | Performed by: FAMILY MEDICINE

## 2018-04-03 PROCEDURE — 36415 COLL VENOUS BLD VENIPUNCTURE: CPT | Performed by: FAMILY MEDICINE

## 2018-04-03 RX ORDER — SODIUM FLUORIDE 5 MG/ML
PASTE, DENTIFRICE DENTAL
Qty: 51 G | Refills: 5 | Status: SHIPPED | OUTPATIENT
Start: 2018-04-03 | End: 2019-05-21

## 2018-04-03 RX ORDER — ATOVAQUONE AND PROGUANIL HYDROCHLORIDE 250; 100 MG/1; MG/1
1 TABLET, FILM COATED ORAL DAILY
Qty: 100 TABLET | Refills: 0 | Status: SHIPPED | OUTPATIENT
Start: 2018-04-03 | End: 2018-05-22

## 2018-04-03 RX ORDER — TAMSULOSIN HYDROCHLORIDE 0.4 MG/1
0.4 CAPSULE ORAL DAILY
Qty: 90 CAPSULE | Refills: 11 | Status: SHIPPED | OUTPATIENT
Start: 2018-04-03 | End: 2018-06-01

## 2018-04-03 RX ORDER — GABAPENTIN 300 MG/1
300 CAPSULE ORAL 2 TIMES DAILY
Qty: 270 CAPSULE | Refills: 0 | Status: SHIPPED | OUTPATIENT
Start: 2018-04-03 | End: 2018-05-22

## 2018-04-03 RX ORDER — CLOPIDOGREL BISULFATE 75 MG/1
75 TABLET ORAL DAILY
Qty: 30 TABLET | Refills: 11 | Status: SHIPPED | OUTPATIENT
Start: 2018-04-03 | End: 2018-06-01

## 2018-04-03 RX ORDER — SENNOSIDES 8.6 MG
CAPSULE ORAL
Qty: 270 TABLET | Refills: 0 | Status: SHIPPED | OUTPATIENT
Start: 2018-04-03 | End: 2018-06-01

## 2018-04-03 RX ORDER — GLIMEPIRIDE 4 MG/1
4 TABLET ORAL
Qty: 180 TABLET | Refills: 11 | Status: SHIPPED | OUTPATIENT
Start: 2018-04-03 | End: 2018-06-01

## 2018-04-03 RX ORDER — ATORVASTATIN CALCIUM 20 MG/1
20 TABLET, FILM COATED ORAL DAILY
Qty: 90 TABLET | Refills: 0 | Status: SHIPPED | OUTPATIENT
Start: 2018-04-03 | End: 2018-06-01

## 2018-04-03 RX ORDER — LOSARTAN POTASSIUM AND HYDROCHLOROTHIAZIDE 12.5; 1 MG/1; MG/1
1 TABLET ORAL DAILY
Qty: 30 TABLET | Refills: 11 | Status: SHIPPED | OUTPATIENT
Start: 2018-04-03 | End: 2018-06-01

## 2018-04-03 RX ORDER — METOPROLOL SUCCINATE 25 MG/1
TABLET, EXTENDED RELEASE ORAL
Qty: 90 TABLET | Refills: 3 | Status: SHIPPED | OUTPATIENT
Start: 2018-04-03 | End: 2018-06-01

## 2018-04-03 ASSESSMENT — PATIENT HEALTH QUESTIONNAIRE - PHQ9
SUM OF ALL RESPONSES TO PHQ QUESTIONS 1-9: 0
10. IF YOU CHECKED OFF ANY PROBLEMS, HOW DIFFICULT HAVE THESE PROBLEMS MADE IT FOR YOU TO DO YOUR WORK, TAKE CARE OF THINGS AT HOME, OR GET ALONG WITH OTHER PEOPLE: NOT DIFFICULT AT ALL
SUM OF ALL RESPONSES TO PHQ QUESTIONS 1-9: 0

## 2018-04-03 NOTE — MR AVS SNAPSHOT
After Visit Summary   4/3/2018    Vikram Rodgers    MRN: [id number]           Patient Information     Date Of Birth          1970        Visit Information        Provider Department      4/3/2018 3:30 PM Hamlet Tavares MD; [employer] [facility]        Today's Diagnoses     H/O: stroke with residual effects LEFT ARM AND LEG    -  1    Chronic bronchitis, unspecified chronic bronchitis type (H)        Type 2 diabetes mellitus, uncontrolled, with neuropathy (H)        Hypertension goal BP (blood pressure) < 140/90        Hyperlipidemia LDL goal <100        Major depressive disorder with single episode, in partial remission (H)        Benign prostatic hyperplasia with urinary frequency        Paranoid schizophrenia (H)        Hallucinations        Other hyperlipidemia        Pain in joint, pelvic region and thigh, unspecified laterality        Type 2 diabetes mellitus with diabetic polyneuropathy, without long-term current use of insulin (H)        Benign non-nodular prostatic hyperplasia with lower urinary tract symptoms        Cough        Essential hypertension with goal blood pressure less than 140/90        Tooth pain        Disturbance of skin sensation        Chronic radicular low back pain        Counseling for travel          Care Instructions    (I69.30) H/O: stroke with residual effects LEFT ARM AND LEG  (primary encounter diagnosis)  Comment:    Plan: clopidogrel (PLAVIX) 75 MG tablet             (J42) Chronic bronchitis, unspecified chronic bronchitis type (H)  Comment:    Plan:      (E11.40,  E11.65) Type 2 diabetes mellitus, uncontrolled, with neuropathy (H)  Comment:    Plan: sitagliptin (JANUVIA) 100 MG tablet             (I10) Hypertension goal BP (blood pressure) < 140/90  Comment:    Plan: metoprolol succinate (TOPROL-XL) 25 MG 24 hr         tablet             (E78.5) Hyperlipidemia LDL goal <100  Comment:    Plan:  atorvastatin (LIPITOR) 20 MG tablet             (F32.4) Major depressive disorder with single episode, in partial remission (H)  Comment:    Plan:      (N40.1,  R35.0) Benign prostatic hyperplasia with urinary frequency  Comment:    Plan:      (F20.0) Paranoid schizophrenia (H)  Comment:    Plan:      (R44.3) Hallucinations  Comment:    Plan:      (E78.4) Other hyperlipidemia  Comment:    Plan:      (M25.559) Pain in joint, pelvic region and thigh, unspecified laterality  Comment:    Plan: diclofenac (VOLTAREN) 1 % GEL topical gel              (E11.42) Type 2 diabetes mellitus with diabetic polyneuropathy, without long-term current use of insulin (H)  Comment:    Plan: blood glucose calibration (ONETOUCH ULTRA         CONTROL) solution, glimepiride (AMARYL) 4 MG         tablet             (N40.1) Benign non-nodular prostatic hyperplasia with lower urinary tract symptoms  Comment:    Plan: tamsulosin (FLOMAX) 0.4 MG capsule             (R05) Cough  Comment:    Plan: blood glucose monitoring (NO BRAND SPECIFIED)         test strip             (I10) Essential hypertension with goal blood pressure less than 140/90  Comment:    Plan: losartan-hydrochlorothiazide (HYZAAR) 100-12.5         MG per tablet             (K08.89) Tooth pain  Comment:    Plan: Sodium Fluoride (DENTA 5000 PLUS) 1.1 % CREA             (R20.9) Disturbance of skin sensation  Comment:     Plan: gabapentin (NEURONTIN) 300 MG capsule             (M54.16,  G89.29) Chronic radicular low back pain  Comment:    Plan: acetaminophen (MAPAP ARTHRITIS PAIN) 650 MG CR         tablet             (Z71.89) Counseling for travel  Comment:    Plan: Hemoglobin A1c, atovaquone-proguanil (MALARONE)        250-100 MG per tablet  Hamlet Tavares Jr., MD                             Follow-ups after your visit        Follow-up notes from your care team     Return in about 4 months (around 8/3/2018).      Who to contact     If you have questions or need follow up  "information about today's clinic visit or your schedule please contact [facility] directly at [phone].  Normal or non-critical lab and imaging results will be communicated to you by MyChart, letter or phone within 4 business days after the clinic has received the results. If you do not hear from us within 7 days, please contact the clinic through [email or web address] or phone. If you have a critical or abnormal lab result, we will notify you by phone as soon as possible.  Submit refill requests through [email or web address] or call your pharmacy and they will forward the refill request to us. Please allow 3 business days for your refill to be completed.          Additional Information About Your Visit        [employer] Information     [email or web address] lets you send messages to your doctor, view your test results, renew your prescriptions, schedule appointments and more. To sign up, go to [email or web address] . Click on \"Log in\" on the left side of the screen, which will take you to the Welcome page. Then click on \"Sign up Now\" on the right side of the page.     You will be asked to enter the access code listed below, as well as some personal information. Please follow the directions to create your username and password.     Your access code is: [email or web address]  Expires: 2018  3:15 PM     Your access code will  in 90 days. If you need help or a new code, please call your [place] clinic or [phone].        Care EveryWhere ID     This is your Care EveryWhere ID. This could be used by other organizations to access your [place] medical records  [id number]        Your Vitals Were     Pulse Temperature Respirations Pulse Oximetry BMI (Body Mass Index)       86 97.5  F (36.4  C) (Tympanic) 20 100% 24.02 kg/m2        Blood Pressure from Last 3 Encounters:   18 136/74   18 (!) 140/94   18 144/70    Weight from Last 3 Encounters:   18 165 lb (74.8 kg)   18 162 lb 14.7 oz (73.9 " kg)   02/12/18 163 lb (73.9 kg)              We Performed the Following     Hemoglobin A1c          Today's Medication Changes          These changes are accurate as of 4/3/18  4:26 PM.  If you have any questions, ask your nurse or doctor.               Start taking these medicines.        Dose/Directions    atovaquone-proguanil 250-100 MG per tablet   Commonly known as:  MALARONE   Used for:  Counseling for travel   Started by:  Hamlet Tavares MD        Dose:  1 tablet   Take 1 tablet by mouth daily Start 2 days before travel and continue 7 days after return.   Quantity:  100 tablet   Refills:  0         These medicines have changed or have updated prescriptions.        Dose/Directions    acetaminophen 650 MG CR tablet   Commonly known as:  MAPAP ARTHRITIS PAIN   This may have changed:  See the new instructions.   Used for:  Chronic radicular low back pain   Changed by:  Hamlet Tavares MD        TAKE 1 TABLET (650 MG) BY MOUTH 3 TIMES DAILY Profile Rx: patient will contact pharmacy when needed   Quantity:  270 tablet   Refills:  0       diclofenac 1 % Gel topical gel   Commonly known as:  VOLTAREN   This may have changed:  additional instructions   Used for:  Pain in joint, pelvic region and thigh, unspecified laterality   Changed by:  Hamlet Tavares MD        APPLY 4 GRAMS TO KNEES OR 2 GRAMS TO HANDS BY TOPICAL ROUTE FOUR TIMES DAILY USING ENCLOSED DOSING CARD Profile Rx: patient will contact pharmacy when needed   Quantity:  100 g   Refills:  0       metoprolol succinate 25 MG 24 hr tablet   Commonly known as:  TOPROL-XL   This may have changed:  See the new instructions.   Used for:  Hypertension goal BP (blood pressure) < 140/90   Changed by:  Hamlet Tavares MD        TAKE 1 TABLET (25 MG) BY MOUTH DAILY Profile Rx: patient will contact pharmacy when needed   Quantity:  90 tablet   Refills:  3       sitagliptin 100 MG tablet   Commonly known as:  JANUVIA   This may have  changed:  See the new instructions.   Used for:  Type 2 diabetes mellitus, uncontrolled, with neuropathy (H)   Changed by:  Hamlet Tavares MD        TAKE 1 TABLET (100 MG) BY MOUTH DAILY Profile Rx: patient will contact pharmacy when needed   Quantity:  90 tablet   Refills:  0       Sodium Fluoride 1.1 % Crea   Commonly known as:  DENTA 5000 PLUS   This may have changed:  See the new instructions.   Used for:  Tooth pain   Changed by:  Hamlet Tavares MD        USE FOR DAILY BRUSHING Profile Rx: patient will contact pharmacy when needed   Quantity:  51 g   Refills:  5            Where to get your medicines      These medications were sent to [facility] [address] [place], [place] - [address]  [address] [address]     Phone:  [phone]     acetaminophen 650 MG CR tablet    atorvastatin 20 MG tablet    atovaquone-proguanil 250-100 MG per tablet    blood glucose calibration solution    blood glucose monitoring test strip    clopidogrel 75 MG tablet    gabapentin 300 MG capsule    glimepiride 4 MG tablet    losartan-hydrochlorothiazide 100-12.5 MG per tablet    metoprolol succinate 25 MG 24 hr tablet    tamsulosin 0.4 MG capsule         Some of these will need a paper prescription and others can be bought over the counter.  Ask your nurse if you have questions.     Bring a paper prescription for each of these medications     diclofenac 1 % Gel topical gel    sitagliptin 100 MG tablet    Sodium Fluoride 1.1 % Crea                Primary Care Provider Office Phone # Fax #    Hamlet Tavares [phone] [address]  [facility] [address]        Equal Access to Services     [facility]: Hadii michael ku hadasho Sogarethali, waaxda luqadaha, qaybta kaalmada adeegyada, javier harrison. So [facility] [phone].    ATENCIÓN: Si habla español, tiene a mathur disposición servicios gratuitos de asistencia lingüística. Llame al  [phone].    We comply with applicable federal civil rights laws and [place] laws. We do not discriminate on the basis of race, color, national origin, age, disability, sex, sexual orientation, or gender identity.            Thank you!     Thank you for choosing [facility]  for your care. Our goal is always to provide you with excellent care. Hearing back from our patients is one way we can continue to improve our services. Please take a few minutes to complete the written survey that you may receive in the mail after your visit with us. Thank you!             Your Updated Medication List - Protect others around you: Learn how to safely use, store and throw away your medicines at www.disposemymeds.org.          This list is accurate as of 4/3/18  4:26 PM.  Always use your most recent med list.                   Brand Name Dispense Instructions for use Diagnosis    acetaminophen 650 MG CR tablet    MAPAP ARTHRITIS PAIN    270 tablet    TAKE 1 TABLET (650 MG) BY MOUTH 3 TIMES DAILY Profile Rx: patient will contact pharmacy when needed    Chronic radicular low back pain       atorvastatin 20 MG tablet    LIPITOR    90 tablet    Take 1 tablet (20 mg) by mouth daily    Hyperlipidemia LDL goal <100       atovaquone-proguanil 250-100 MG per tablet    MALARONE    100 tablet    Take 1 tablet by mouth daily Start 2 days before travel and continue 7 days after return.    Counseling for travel       blood glucose calibration solution     1 Bottle    Use to calibrate blood glucose monitor as directed.    Type 2 diabetes mellitus with diabetic polyneuropathy, without long-term current use of insulin (H)       blood glucose monitoring test strip    no brand specified    100 each    1 strip by In Vitro route daily    Cough       clopidogrel 75 MG tablet    PLAVIX    30 tablet    Take 1 tablet (75 mg) by mouth daily    H/O: stroke with residual effects       diclofenac 1 % Gel topical gel     VOLTAREN    100 g    APPLY 4 GRAMS TO KNEES OR 2 GRAMS TO HANDS BY TOPICAL ROUTE FOUR TIMES DAILY USING ENCLOSED DOSING CARD Profile Rx: patient will contact pharmacy when needed    Pain in joint, pelvic region and thigh, unspecified laterality       gabapentin 300 MG capsule    NEURONTIN    270 capsule    Take 1 capsule (300 mg) by mouth 2 times daily    Disturbance of skin sensation       glimepiride 4 MG tablet    AMARYL    180 tablet    Take 1 tablet (4 mg) by mouth two times daily    Type 2 diabetes mellitus with diabetic polyneuropathy, without long-term current use of insulin (H)       losartan-hydrochlorothiazide 100-12.5 MG per tablet    HYZAAR    30 tablet    Take 1 tablet by mouth daily    Essential hypertension with goal blood pressure less than 140/90       metoprolol succinate 25 MG 24 hr tablet    TOPROL-XL    90 tablet    TAKE 1 TABLET (25 MG) BY MOUTH DAILY Profile Rx: patient will contact pharmacy when needed    Hypertension goal BP (blood pressure) < 140/90       sitagliptin 100 MG tablet    JANUVIA    90 tablet    TAKE 1 TABLET (100 MG) BY MOUTH DAILY Profile Rx: patient will contact pharmacy when needed    Type 2 diabetes mellitus, uncontrolled, with neuropathy (H)       Sodium Fluoride 1.1 % Crea    DENTA 5000 PLUS    51 g    USE FOR DAILY BRUSHING Profile Rx: patient will contact pharmacy when needed    Tooth pain       tamsulosin 0.4 MG capsule    FLOMAX    90 capsule    Take 1 capsule (0.4 mg) by mouth daily    Benign non-nodular prostatic hyperplasia with lower urinary tract symptoms

## 2018-04-03 NOTE — PATIENT INSTRUCTIONS
(I69.30) H/O: stroke with residual effects LEFT ARM AND LEG  (primary encounter diagnosis)  Comment:    Plan: clopidogrel (PLAVIX) 75 MG tablet             (J42) Chronic bronchitis, unspecified chronic bronchitis type (H)  Comment:    Plan:      (E11.40,  E11.65) Type 2 diabetes mellitus, uncontrolled, with neuropathy (H)  Comment:    Plan: sitagliptin (JANUVIA) 100 MG tablet             (I10) Hypertension goal BP (blood pressure) < 140/90  Comment:    Plan: metoprolol succinate (TOPROL-XL) 25 MG 24 hr         tablet             (E78.5) Hyperlipidemia LDL goal <100  Comment:    Plan: atorvastatin (LIPITOR) 20 MG tablet             (F32.4) Major depressive disorder with single episode, in partial remission (H)  Comment:    Plan:      (N40.1,  R35.0) Benign prostatic hyperplasia with urinary frequency  Comment:    Plan:      (F20.0) Paranoid schizophrenia (H)  Comment:    Plan:      (R44.3) Hallucinations  Comment:    Plan:      (E78.4) Other hyperlipidemia  Comment:    Plan:      (M25.559) Pain in joint, pelvic region and thigh, unspecified laterality  Comment:    Plan: diclofenac (VOLTAREN) 1 % GEL topical gel              (E11.42) Type 2 diabetes mellitus with diabetic polyneuropathy, without long-term current use of insulin (H)  Comment:    Plan: blood glucose calibration (ONETOUCH ULTRA         CONTROL) solution, glimepiride (AMARYL) 4 MG         tablet             (N40.1) Benign non-nodular prostatic hyperplasia with lower urinary tract symptoms  Comment:    Plan: tamsulosin (FLOMAX) 0.4 MG capsule             (R05) Cough  Comment:    Plan: blood glucose monitoring (NO BRAND SPECIFIED)         test strip             (I10) Essential hypertension with goal blood pressure less than 140/90  Comment:    Plan: losartan-hydrochlorothiazide (HYZAAR) 100-12.5         MG per tablet             (K08.89) Tooth pain  Comment:    Plan: Sodium Fluoride (DENTA 5000 PLUS) 1.1 % CREA             (R20.9) Disturbance of skin  sensation  Comment:     Plan: gabapentin (NEURONTIN) 300 MG capsule             (M54.16,  G89.29) Chronic radicular low back pain  Comment:    Plan: acetaminophen (MAPAP ARTHRITIS PAIN) 650 MG CR         tablet             (Z71.89) Counseling for travel  Comment:    Plan: Hemoglobin A1c, atovaquone-proguanil (MALARONE)        250-100 MG per tablet  Hamlet Tavares Jr., MD

## 2018-04-03 NOTE — NURSING NOTE
"Chief Complaint   Patient presents with     Diabetes     Travel Clinic     traveling to [place]       Initial /74  Pulse 86  Temp 97.5  F (36.4  C) (Tympanic)  Resp 20  Wt 165 lb (74.8 kg)  SpO2 100%  BMI 24.02 kg/m2 Estimated body mass index is 24.02 kg/(m^2) as calculated from the following:    Height as of 2/16/18: 5' 9.5\" (1.765 m).    Weight as of this encounter: 165 lb (74.8 kg).  Medication Reconciliation: complete   Drea Moses CMA    "

## 2018-04-03 NOTE — PROGRESS NOTES
SUBJECTIVE:   Vikram Rodgers is a 48 year old male who presents to clinic today for the following health issues:      Diabetes Follow-up      Patient is checking blood sugars: not at all    Diabetic concerns: traveling for 3 months the end of April     Symptoms of hypoglycemia (low blood sugar): none     Paresthesias (numbness or burning in feet) or sores: Yes      Date of last diabetic eye exam: unknown    BP Readings from Last 2 Encounters:   02/16/18 (!) 140/94   02/12/18 144/70     Hemoglobin A1C (%)   Date Value   01/23/2018 10.1 (H)   10/24/2017 11.4 (H)     LDL Cholesterol Calculated (mg/dL)   Date Value   10/24/2017 172 (H)   09/22/2016 145 (H)     LDL Cholesterol Direct (mg/dL)   Date Value   01/23/2018 164 (H)       Amount of exercise or physical activity: None    Problems taking medications regularly: yes, forgets sometimes    Medication side effects: none    Diet: regular (no restrictions)      .MONET ALLEN MD .4/3/2018 6:04 PM .April 3, 2018        Vikram Rodgers is a 48 year old male who is who presents with  POORLY CONTROLLED DIABETES 2 GOAL 8%     OUT OF MEDICATIONS LAST TWO WEEKS     PARANOID SCHIZOPHRENIA  NON ADHERENT BEHAVIOR    ATRIAL FIBRILLATION  PATCH TO BE RETURNED TO CARDIOLOGY     TREATMENT PROGNOSIS BENEFITS AND RISKS DISCUSSED     Onset : MANY YEARS      Severity:  POORLY CONTROLLED AND NON ADHERENT       Home treatments MEDICATIONS DIET AND EXERCISE      Additional Symptoms: LEFT  HEMIPLEGIA      Course ONGOING SYMPTOMS \    PLANS TO TRAVEL TO [facility]    HISTORY OF TB     PARANOID SCHIZOPHRENIA     BENIGN PROSTATIC HYPERTROPHY SYMPTOMS IMPROVED    CARPAL TUNNEL SYNDROME HISTORY IMPROVED    VITAMIN D REPLACEMENT      NOCTURIA     BILATERAL DIABETIC NEUROPATHY     HYPERTENSION WITH GOAL OF LESS THAN 140/80 CONTROLLED     CHRONIC OBSTRUCTIVE LUNG DISEASE AND CHRONIC SMOKER     DENTAL PAIN HISTORY ASYMPTOMATIC AT PRESENT                     Topic Date Due     FOOT EXAM Q1  YEAR  06/28/2017     EYE EXAM Q1 YEAR  08/22/2017     TSH W/ FREE T4 REFLEX Q2 YEAR  02/15/2018               .  Current Outpatient Prescriptions   Medication Sig Dispense Refill     sitagliptin (JANUVIA) 100 MG tablet TAKE 1 TABLET (100 MG) BY MOUTH DAILY  Profile Rx: patient will contact pharmacy when needed 90 tablet 0     diclofenac (VOLTAREN) 1 % GEL topical gel APPLY 4 GRAMS TO KNEES OR 2 GRAMS TO HANDS BY TOPICAL ROUTE FOUR TIMES DAILY USING ENCLOSED DOSING CARD Profile Rx: patient will contact pharmacy when needed 100 g 0     blood glucose calibration (ONETOUCH ULTRA CONTROL) solution Use to calibrate blood glucose monitor as directed. 1 Bottle 11     atorvastatin (LIPITOR) 20 MG tablet Take 1 tablet (20 mg) by mouth daily 90 tablet 0     tamsulosin (FLOMAX) 0.4 MG capsule Take 1 capsule (0.4 mg) by mouth daily 90 capsule 11     blood glucose monitoring (NO BRAND SPECIFIED) test strip 1 strip by In Vitro route daily 100 each 11     losartan-hydrochlorothiazide (HYZAAR) 100-12.5 MG per tablet Take 1 tablet by mouth daily 30 tablet 11     glimepiride (AMARYL) 4 MG tablet Take 1 tablet (4 mg) by mouth two times daily 180 tablet 11     metoprolol succinate (TOPROL-XL) 25 MG 24 hr tablet TAKE 1 TABLET (25 MG) BY MOUTH DAILY Profile Rx: patient will contact pharmacy when needed 90 tablet 3     Sodium Fluoride (DENTA 5000 PLUS) 1.1 % CREA USE FOR DAILY BRUSHING  Profile Rx: patient will contact pharmacy when needed 51 g 5     clopidogrel (PLAVIX) 75 MG tablet Take 1 tablet (75 mg) by mouth daily 30 tablet 11     gabapentin (NEURONTIN) 300 MG capsule Take 1 capsule (300 mg) by mouth 2 times daily 270 capsule 0     acetaminophen (MAPAP ARTHRITIS PAIN) 650 MG CR tablet TAKE 1 TABLET (650 MG) BY MOUTH 3 TIMES DAILY Profile Rx: patient will contact pharmacy when needed 270 tablet 0     atovaquone-proguanil (MALARONE) 250-100 MG per tablet Take 1 tablet by mouth daily Start 2 days before travel and continue 7 days after  return. 100 tablet 0     [DISCONTINUED] JANUVIA 100 MG tablet TAKE 1 TABLET (100 MG) BY MOUTH DAILY 90 tablet 0     [DISCONTINUED] gabapentin (NEURONTIN) 300 MG capsule Take 1 capsule (300 mg) by mouth 2 times daily 90 capsule 11     [DISCONTINUED] clopidogrel (PLAVIX) 75 MG tablet Take 1 tablet (75 mg) by mouth daily 30 tablet 11     [DISCONTINUED] metoprolol (TOPROL-XL) 25 MG 24 hr tablet TAKE 1 TABLET (25 MG) BY MOUTH DAILY 90 tablet 3     [DISCONTINUED] glimepiride (AMARYL) 4 MG tablet Take 1 tablet (4 mg) by mouth two times daily 60 tablet 11     [DISCONTINUED] losartan-hydrochlorothiazide (HYZAAR) 100-12.5 MG per tablet Take 1 tablet by mouth daily 30 tablet 11     [DISCONTINUED] atorvastatin (LIPITOR) 20 MG tablet Take 1 tablet (20 mg) by mouth daily 90 tablet 0              No Known Allergies      Immunization History   Administered Date(s) Administered     Influenza (IIV3) PF 10/04/2010, 2013     Influenza Vaccine IM 3yrs+ 4 Valent IIV4 10/06/2014     Pneumococcal 23 valent 2013     TD (ADULT, 7+) 2010               reports that he does not drink alcohol.          reports that he does not use illicit drugs.        family history includes Asthma in his father; C.A.D. in his father; DIABETES in his father; Hypertension in his father; Neurologic Disorder in his brother; Unknown/Adopted in his mother. There is no history of Cancer - colorectal.        indicated that the status of his no family hx of is unknown. He indicated that the status of his mother is unknown. He indicated that his father is . He indicated that the status of his brother is unknown.          has a past surgical history that includes ENT surgery.         reports that he does not engage in sexual activity.    .  Pediatric History   Patient Guardian Status     Not on file.     Other Topics Concern     Parent/Sibling W/ Cabg, Mi Or Angioplasty Before 65f 55m? No     unknown     Social History Narrative                reports that he quit smoking about 13 months ago. His smoking use included Cigarettes. He smoked 0.25 packs per day. He has never used smokeless tobacco.        Medical, social, surgical, and family histories reviewed.        Labs reviewed in EPIC  Patient Active Problem List   Diagnosis     Latent tuberculosis infection     PUD (peptic ulcer disease)     Moderate major depression (H)     Unspecified hyperplasia of prostate with urinary obstruction and other lower urinary tract symptoms (LUTS)     Carpal tunnel syndrome     Disturbance of skin sensation     Vitamin D deficiency     Paranoid schizophrenia (H)     Nocturia     Schizophrenia (H)     Hallucinations     Positive PPD     Hyperlipidemia     Tobacco use disorder     Gastroesophageal reflux disease without esophagitis     Health Care Home     Major depression in partial remission (H)     Hyperlipidemia LDL goal <100     Type 2 diabetes mellitus, uncontrolled, with neuropathy (H)     Hypertension goal BP (blood pressure) < 140/90     COPD (chronic obstructive pulmonary disease) (H)     Pain in joint, pelvic region and thigh     Tooth pain     Enlarged prostate with lower urinary tract symptoms (LUTS)     H/O: stroke with residual effects LEFT ARM AND LEG       Past Surgical History:   Procedure Laterality Date     ENT SURGERY      sinuses         Social History   Substance Use Topics     Smoking status: Former Smoker     Packs/day: 0.25     Types: Cigarettes     Quit date: 2/16/2017     Smokeless tobacco: Never Used     Alcohol use No       Family History   Problem Relation Age of Onset     Asthma Father      DIABETES Father      Hypertension Father      C.A.D. Father      Neurologic Disorder Brother      Unknown/Adopted Mother      Cancer - colorectal No family hx of              Current Outpatient Prescriptions   Medication Sig Dispense Refill     sitagliptin (JANUVIA) 100 MG tablet TAKE 1 TABLET (100 MG) BY MOUTH DAILY  Profile Rx: patient will contact  pharmacy when needed 90 tablet 0     diclofenac (VOLTAREN) 1 % GEL topical gel APPLY 4 GRAMS TO KNEES OR 2 GRAMS TO HANDS BY TOPICAL ROUTE FOUR TIMES DAILY USING ENCLOSED DOSING CARD Profile Rx: patient will contact pharmacy when needed 100 g 0     blood glucose calibration (ONETOUCH ULTRA CONTROL) solution Use to calibrate blood glucose monitor as directed. 1 Bottle 11     atorvastatin (LIPITOR) 20 MG tablet Take 1 tablet (20 mg) by mouth daily 90 tablet 0     tamsulosin (FLOMAX) 0.4 MG capsule Take 1 capsule (0.4 mg) by mouth daily 90 capsule 11     blood glucose monitoring (NO BRAND SPECIFIED) test strip 1 strip by In Vitro route daily 100 each 11     losartan-hydrochlorothiazide (HYZAAR) 100-12.5 MG per tablet Take 1 tablet by mouth daily 30 tablet 11     glimepiride (AMARYL) 4 MG tablet Take 1 tablet (4 mg) by mouth two times daily 180 tablet 11     metoprolol succinate (TOPROL-XL) 25 MG 24 hr tablet TAKE 1 TABLET (25 MG) BY MOUTH DAILY Profile Rx: patient will contact pharmacy when needed 90 tablet 3     Sodium Fluoride (DENTA 5000 PLUS) 1.1 % CREA USE FOR DAILY BRUSHING  Profile Rx: patient will contact pharmacy when needed 51 g 5     clopidogrel (PLAVIX) 75 MG tablet Take 1 tablet (75 mg) by mouth daily 30 tablet 11     gabapentin (NEURONTIN) 300 MG capsule Take 1 capsule (300 mg) by mouth 2 times daily 270 capsule 0     acetaminophen (MAPAP ARTHRITIS PAIN) 650 MG CR tablet TAKE 1 TABLET (650 MG) BY MOUTH 3 TIMES DAILY Profile Rx: patient will contact pharmacy when needed 270 tablet 0     atovaquone-proguanil (MALARONE) 250-100 MG per tablet Take 1 tablet by mouth daily Start 2 days before travel and continue 7 days after return. 100 tablet 0     [DISCONTINUED] JANUVIA 100 MG tablet TAKE 1 TABLET (100 MG) BY MOUTH DAILY 90 tablet 0     [DISCONTINUED] gabapentin (NEURONTIN) 300 MG capsule Take 1 capsule (300 mg) by mouth 2 times daily 90 capsule 11     [DISCONTINUED] clopidogrel (PLAVIX) 75 MG tablet Take 1  tablet (75 mg) by mouth daily 30 tablet 11     [DISCONTINUED] metoprolol (TOPROL-XL) 25 MG 24 hr tablet TAKE 1 TABLET (25 MG) BY MOUTH DAILY 90 tablet 3     [DISCONTINUED] glimepiride (AMARYL) 4 MG tablet Take 1 tablet (4 mg) by mouth two times daily 60 tablet 11     [DISCONTINUED] losartan-hydrochlorothiazide (HYZAAR) 100-12.5 MG per tablet Take 1 tablet by mouth daily 30 tablet 11     [DISCONTINUED] atorvastatin (LIPITOR) 20 MG tablet Take 1 tablet (20 mg) by mouth daily 90 tablet 0           Recent Labs   Lab Test  04/03/18   1623  01/23/18   1418  10/24/17   1637  09/22/16   1637  02/15/16   1655   04/08/13   1232   A1C  11.6*  10.1*  11.4*  9.0*   --    < >  11.3*   LDL   --   164*  172*  145*  168*   < >  153*   HDL   --    --   41  39*  34*   < >  32*   TRIG   --    --   170*  264*  178*   < >  338*   ALT   --    --   50  44  39   < >  44   CR   --   0.60*  0.60*  0.85  0.70   < >  0.80   GFRESTIMATED   --   >90  >90  >90  >90   < >  >90   GFRESTBLACK   --   >90  >90  >90  >90   < >  >90   POTASSIUM   --   3.9  4.2  4.8  4.3   < >  4.2   TSH   --    --    --    --   1.01  1.01   --   1.87    < > = values in this interval not displayed.            BP Readings from Last 6 Encounters:   04/03/18 136/74   02/16/18 (!) 140/94   02/12/18 144/70   01/23/18 112/64   11/24/17 124/82   11/17/17 108/62           Wt Readings from Last 3 Encounters:   04/03/18 165 lb (74.8 kg)   02/16/18 162 lb 14.7 oz (73.9 kg)   02/12/18 163 lb (73.9 kg)                 Positive symptoms or findings indicated by bold designation:         ROS: 10 point ROS neg other than the symptoms noted above in the HPI.except  has Latent tuberculosis infection; PUD (peptic ulcer disease); Moderate major depression (H); Unspecified hyperplasia of prostate with urinary obstruction and other lower urinary tract symptoms (LUTS); Carpal tunnel syndrome; Disturbance of skin sensation; Vitamin D deficiency; Paranoid schizophrenia (H); Nocturia;  Schizophrenia (H); Hallucinations; Positive PPD; Hyperlipidemia; Tobacco use disorder; Gastroesophageal reflux disease without esophagitis; Health Care Home; Major depression in partial remission (H); Hyperlipidemia LDL goal <100; Type 2 diabetes mellitus, uncontrolled, with neuropathy (H); Hypertension goal BP (blood pressure) < 140/90; COPD (chronic obstructive pulmonary disease) (H); Pain in joint, pelvic region and thigh; Tooth pain; Enlarged prostate with lower urinary tract symptoms (LUTS); and H/O: stroke with residual effects LEFT ARM AND LEG on his problem list.  Review Of Systems    Skin: negative    Eyes: negative    Ears/Nose/Throat: negative    Respiratory: No shortness of breath, dyspnea on exertion, cough, or hemoptysis and SMOKER    Cardiovascular: negative QUESTION ATRIAL FIBRILLATION  PATCH NEEDS TO BE RETURN FOR SCREENING     Gastrointestinal: negative    Genitourinary:  BENIGN PROSTATIC HYPERTROPHY     Musculoskeletal: back pain, neck pain and arthritis    Neurologic: LEFT HEMIPLEGIA     Psychiatric:   PARANOID SCHIZOPHRENIA    Hematologic/Lymphatic/Immunologic: negative    Endocrine: diabetes                PE:  /74  Pulse 86  Temp 97.5  F (36.4  C) (Tympanic)  Resp 20  Wt 165 lb (74.8 kg)  SpO2 100%  BMI 24.02 kg/m2 Body mass index is 24.02 kg/(m^2).        Constitutional: general appearance, well nourished, well developed, in no acute distress, well developed, appears stated age, normal body habitus,          Eyes:; The patient has normal eyelids sclerae and conjunctivae :          Ears/Nose/Throat: external ear, overall: normal appearance; external nose, overall: benign appearance, normal moujth gums and lips           Neck: thyroid, overall: normal size, normal consistency, nontender,          Respiratory:  palpation of chest, overall: normal excursion,    Clear to percussion and auscultation     NO Tachypnea    NORMAL  Color          Cardiovascular:  Good color with no  peripheral edema    Regular sinus rhythm without murmur.  Physiologic heart sounds   Heart is unelarged    .     Chest/Breast: normal shape           Abdominal exam,  Liver and spleen are  unenlarged        Tenderness    Scars              Urogenital; no renal, flank or bladder  tenderness;          Lymphatic: neck nodes,     Other nodes         Musculoskeletal:  Brief ortho exam normal except:   NORMAL UPPER EXTREMETIES AND LOWER EXTREMITY BILATERAL     DECREASE RANGE OF MOTION OF BACK         Integument: inspection of skin, no rash, lesions; and, palpation, no induration, no tenderness.          Neurologic mental status, overall: alert and oriented; gait, no ataxia, no unsteadiness; coordination, no tremors; cranial nerves, overall: normal motor, overall: normal bulk, tone.          Psychiatric: orientation/consciousness, overall: oriented to person, place and time; behavior/psychomotor activity, no tics, normal psychomotor activity; mood and affect, overall: normal mood and affect; appearance, overall: well-groomed, good eye contact; speech, overall: normal quality, no aphasia and normal quality, quantity, intact.        Diagnostic Test Results:  Results for orders placed or performed in visit on 04/03/18   Hemoglobin A1c   Result Value Ref Range    Hemoglobin A1C 11.6 (H) 0 - 6.4 %           ICD-10-CM    1. H/O: stroke with residual effects LEFT ARM AND LEG I69.30 clopidogrel (PLAVIX) 75 MG tablet   2. Chronic bronchitis, unspecified chronic bronchitis type (H) J42    3. Type 2 diabetes mellitus, uncontrolled, with neuropathy (H) E11.40 sitagliptin (JANUVIA) 100 MG tablet    E11.65    4. Hypertension goal BP (blood pressure) < 140/90 I10 metoprolol succinate (TOPROL-XL) 25 MG 24 hr tablet   5. Hyperlipidemia LDL goal <100 E78.5 atorvastatin (LIPITOR) 20 MG tablet   6. Major depressive disorder with single episode, in partial remission (H) F32.4    7. Benign prostatic hyperplasia with urinary frequency N40.1      R35.0    8. Paranoid schizophrenia (H) F20.0    9. Hallucinations R44.3    10. Other hyperlipidemia E78.4    11. Pain in joint, pelvic region and thigh, unspecified laterality M25.559 diclofenac (VOLTAREN) 1 % GEL topical gel   12. Type 2 diabetes mellitus with diabetic polyneuropathy, without long-term current use of insulin (H) E11.42 blood glucose calibration (ONETOUCH ULTRA CONTROL) solution     glimepiride (AMARYL) 4 MG tablet   13. Benign non-nodular prostatic hyperplasia with lower urinary tract symptoms N40.1 tamsulosin (FLOMAX) 0.4 MG capsule   14. Cough R05 blood glucose monitoring (NO BRAND SPECIFIED) test strip   15. Essential hypertension with goal blood pressure less than 140/90 I10 losartan-hydrochlorothiazide (HYZAAR) 100-12.5 MG per tablet   16. Tooth pain K08.89 Sodium Fluoride (DENTA 5000 PLUS) 1.1 % CREA   17. Disturbance of skin sensation R20.9 gabapentin (NEURONTIN) 300 MG capsule   18. Chronic radicular low back pain M54.16 acetaminophen (MAPAP ARTHRITIS PAIN) 650 MG CR tablet    G89.29    19. Counseling for travel Z71.89 Hemoglobin A1c     atovaquone-proguanil (MALARONE) 250-100 MG per tablet              .    Side effects benefits and risks thoroughly discussed. .he may come in early if unimproved or getting worse          Please drink 2 glasses of water prior to meals and walk 15-30 minutes after meals        I spent 25 MINUTES SPENT  with patient discussing the following issues   The primary encounter diagnosis was H/O: stroke with residual effects LEFT ARM AND LEG. Diagnoses of Chronic bronchitis, unspecified chronic bronchitis type (H), Type 2 diabetes mellitus, uncontrolled, with neuropathy (H), Hypertension goal BP (blood pressure) < 140/90, Hyperlipidemia LDL goal <100, Major depressive disorder with single episode, in partial remission (H), Benign prostatic hyperplasia with urinary frequency, Paranoid schizophrenia (H), Hallucinations, Other hyperlipidemia, Pain in joint, pelvic  region and thigh, unspecified laterality, Type 2 diabetes mellitus with diabetic polyneuropathy, without long-term current use of insulin (H), Benign non-nodular prostatic hyperplasia with lower urinary tract symptoms, Cough, Essential hypertension with goal blood pressure less than 140/90, Tooth pain, Disturbance of skin sensation, Chronic radicular low back pain, and Counseling for travel were also pertinent to this visit. over half of which involved counseling and coordination of care.      Patient Instructions   (I69.30) H/O: stroke with residual effects LEFT ARM AND LEG  (primary encounter diagnosis)  Comment:    Plan: clopidogrel (PLAVIX) 75 MG tablet             (J42) Chronic bronchitis, unspecified chronic bronchitis type (H)  Comment:    Plan:      (E11.40,  E11.65) Type 2 diabetes mellitus, uncontrolled, with neuropathy (H)  Comment:    Plan: sitagliptin (JANUVIA) 100 MG tablet             (I10) Hypertension goal BP (blood pressure) < 140/90  Comment:    Plan: metoprolol succinate (TOPROL-XL) 25 MG 24 hr         tablet             (E78.5) Hyperlipidemia LDL goal <100  Comment:    Plan: atorvastatin (LIPITOR) 20 MG tablet             (F32.4) Major depressive disorder with single episode, in partial remission (H)  Comment:    Plan:      (N40.1,  R35.0) Benign prostatic hyperplasia with urinary frequency  Comment:    Plan:      (F20.0) Paranoid schizophrenia (H)  Comment:    Plan:      (R44.3) Hallucinations  Comment:    Plan:      (E78.4) Other hyperlipidemia  Comment:    Plan:      (M25.559) Pain in joint, pelvic region and thigh, unspecified laterality  Comment:    Plan: diclofenac (VOLTAREN) 1 % GEL topical gel              (E11.42) Type 2 diabetes mellitus with diabetic polyneuropathy, without long-term current use of insulin (H)  Comment:    Plan: blood glucose calibration (ONETOUCH ULTRA         CONTROL) solution, glimepiride (AMARYL) 4 MG         tablet             (N40.1) Benign non-nodular prostatic  hyperplasia with lower urinary tract symptoms  Comment:    Plan: tamsulosin (FLOMAX) 0.4 MG capsule             (R05) Cough  Comment:    Plan: blood glucose monitoring (NO BRAND SPECIFIED)         test strip             (I10) Essential hypertension with goal blood pressure less than 140/90  Comment:    Plan: losartan-hydrochlorothiazide (HYZAAR) 100-12.5         MG per tablet             (K08.89) Tooth pain  Comment:    Plan: Sodium Fluoride (DENTA 5000 PLUS) 1.1 % CREA             (R20.9) Disturbance of skin sensation  Comment:     Plan: gabapentin (NEURONTIN) 300 MG capsule             (M54.16,  G89.29) Chronic radicular low back pain  Comment:    Plan: acetaminophen (MAPAP ARTHRITIS PAIN) 650 MG CR         tablet             (Z71.89) Counseling for travel  Comment:    Plan: Hemoglobin A1c, atovaquone-proguanil (MALARONE)        250-100 MG per tablet  Monet Allen Jr., MD                               ALL THE ABOVE PROBLEMS ARE STABLE AND MED CHANGES AS NOTED        Diet:  MEDITERRANEAN DIET AND DIABETES         Exercise:  UPPER EXTREMETIES AND LOWER EXTREMITY AND LOWER BACK PAIN AND AEROBIC   Exercises Range of motion, balance, isometric, and strengthening exercises 30 repetitions twice daily of involved joints            .MONET ALLEN MD 4/3/2018 6:04 PM  April 3, 2018        Answers for HPI/ROS submitted by the patient on 4/3/2018   If you checked off any problems, how difficult have these problems made it for you to do your work, take care of things at home, or get along with other people?: Not difficult at all  PHQ9 TOTAL SCORE: 0

## 2018-04-03 NOTE — LETTER
April 5, 2018      Vikram Abdi Vishal  [address]  [facility] [address]        Dear ,    We are writing to inform you of your test results.    ABNORMAL THREE MONTH GLUCOSE AVERAGE   TRULICITY WHEN YOU COME BACK FROM [place] TRAVEL     Resulted Orders   Hemoglobin A1c   Result Value Ref Range    Hemoglobin A1C 11.6 (H) 0 - 6.4 %      Comment:      Normal <5.7% Prediabetes 5.7-6.4%  Diabetes 6.5% or higher - adopted from ADA   consensus guidelines.         If you have any questions or concerns, please call the clinic at the number listed above.       Sincerely,        MONET ALLEN MD

## 2018-04-04 ASSESSMENT — PATIENT HEALTH QUESTIONNAIRE - PHQ9: SUM OF ALL RESPONSES TO PHQ QUESTIONS 1-9: 0

## 2018-04-20 NOTE — TELEPHONE ENCOUNTER
Called patient with [facility]  and left voicemail. Advised patient to call back Neurology Clinic regarding ZioPatch.      Princess ELISA Mary CMA

## 2018-04-23 ENCOUNTER — TELEPHONE (OUTPATIENT)
Dept: CARE COORDINATION | Facility: CLINIC | Age: 48
End: 2018-04-23

## 2018-04-23 NOTE — TELEPHONE ENCOUNTER
Social Work Telephone Note  [facility]    Patient Name:  Vikram Rodgers  /Age:  1970 (48 year old)    Referral Source:  Katina Dyson RN  Reason for Referral:  Pt needs transportation to/from outpatient Physical Therapy appts, that need to be set up    SW contacted pt via phone today with a [place]  to follow up regarding pt using [employer], [phone], for transportation to/from medical appts.  Pt has used this for medical transport before and informed SW that they never came to pick pt up for appt.  SW informed pt that should not happen, and if it does, pt should call [employer] again.  Pt has been depending on family/friends for transportation to appts.  Pt denies other SW related needs at this time.  SW will not reach out to pt on a regular basis, though is available for future needs of pt.    STAN Mccallum     Social Work  [facility]  Office:  [phone]  E-Mail:  [email or web address]  2018 1:56 PM

## 2018-04-23 NOTE — PROGRESS NOTES
Clinic Care Coordination     Patient will be closed to care coordination at this time  RE: followed by neurology CC. No contact in greater than 60 days    Judith Robles, Social Work Care Coordinator, [facility], MSW  P:[phone]  [facility] [facility] [employer]

## 2018-05-22 ENCOUNTER — OFFICE VISIT (OUTPATIENT)
Dept: FAMILY MEDICINE | Facility: CLINIC | Age: 48
End: 2018-05-22
Payer: MEDICAID

## 2018-05-22 VITALS
SYSTOLIC BLOOD PRESSURE: 124 MMHG | DIASTOLIC BLOOD PRESSURE: 74 MMHG | HEART RATE: 87 BPM | BODY MASS INDEX: 24.16 KG/M2 | OXYGEN SATURATION: 99 % | WEIGHT: 166 LBS | RESPIRATION RATE: 16 BRPM | TEMPERATURE: 97 F

## 2018-05-22 DIAGNOSIS — I69.30 H/O: STROKE WITH RESIDUAL EFFECTS: Chronic | ICD-10-CM

## 2018-05-22 DIAGNOSIS — N46.9 INFERTILITY MALE: ICD-10-CM

## 2018-05-22 DIAGNOSIS — I63.412 CEREBRAL INFARCTION DUE TO EMBOLISM OF LEFT MIDDLE CEREBRAL ARTERY (H): ICD-10-CM

## 2018-05-22 DIAGNOSIS — I10 HYPERTENSION GOAL BP (BLOOD PRESSURE) < 140/90: Chronic | ICD-10-CM

## 2018-05-22 DIAGNOSIS — E78.5 HYPERLIPIDEMIA LDL GOAL <100: Chronic | ICD-10-CM

## 2018-05-22 DIAGNOSIS — Z13.89 SCREENING FOR DIABETIC PERIPHERAL NEUROPATHY: ICD-10-CM

## 2018-05-22 PROCEDURE — T1013 SIGN LANG/ORAL INTERPRETER: HCPCS | Mod: U3 | Performed by: FAMILY MEDICINE

## 2018-05-22 PROCEDURE — 99214 OFFICE O/P EST MOD 30 MIN: CPT | Performed by: FAMILY MEDICINE

## 2018-05-22 RX ORDER — PREGABALIN 50 MG/1
50 CAPSULE ORAL 3 TIMES DAILY
Qty: 90 CAPSULE | Refills: 2 | Status: SHIPPED | OUTPATIENT
Start: 2018-05-22 | End: 2018-06-01

## 2018-05-22 NOTE — MR AVS SNAPSHOT
After Visit Summary   5/22/2018    Vikram Rodgers    MRN: [id number]           Patient Information     Date Of Birth          1970        Visit Information        Provider Department      5/22/2018 8:30 AM Hamlet Tavares MD; [employer] [facility]        Today's Diagnoses     Type 2 diabetes mellitus, uncontrolled, with neuropathy (H)    -  1    Screening for diabetic peripheral neuropathy        Cerebral infarction due to embolism of left middle cerebral artery (H)        Hyperlipidemia LDL goal <100        Hypertension goal BP (blood pressure) < 140/90        H/O: stroke with residual effects LEFT ARM AND LEG        Infertility male          Care Instructions    (Z13.89) Screening for diabetic peripheral neuropathy  (primary encounter diagnosis)  Comment:    Plan: FOOT EXAM  NO CHARGE [34480.114]             (I63.412) Cerebral infarction due to embolism of left middle cerebral artery (H)  Comment: left sided arm and leg pain referactory to gabapentine]  Plan: TSH WITH FREE T4 REFLEX, pregabalin (LYRICA) 50        MG capsule             (E11.40,  E11.65) Type 2 diabetes mellitus, uncontrolled, with neuropathy (H)  Comment:    Plan: order for DME             (E78.5) Hyperlipidemia LDL goal <100  Comment:    Plan:      (I10) Hypertension goal BP (blood pressure) < 140/90  Comment:    Plan:      (I69.30) H/O: stroke with residual effects LEFT ARM AND LEG  Comment:    Plan: PHYSICAL THERAPY REFERRAL, CANCELED: [facility],         HAND, AND CHIROPRACTIC REFERRAL                           Follow-ups after your visit        Additional Services     MED THERAPY MANAGE REFERRAL       Your provider has referred you to: **[facility] Scheduling (numerous locations) [phone]   [email or web address]  [facility]: [facility] [phone]    [email or web address]    Reason for Referral:  Poorly controlled diabetes     The [place] [facility] department will contact you to schedule an appointment.  You may also schedule the appointment by calling [phone].  For [place] patients, please call [phone] to confirm/schedule your appointment on the next business day.    This service is designed to help you get the most from your medications.  A specially trained Pharmacist will work closely with you and your providers to solve any questions, concerns, issues or problems related to your medications.    Please bring all of your prescription and non-prescription medications (such as vitamins, over-the-counter medications, and herbals) or a detailed medication list to your appointment.    If you have a glucose meter or other home monitoring information, please also bring this to your appointment (i.e. blood glucose log, blood pressure log, pain log, etc.).            PHYSICAL THERAPY REFERRAL       PHYSICAL THERAPY:  [facility] [address] [phone]   DIAGNOSIS ; LEFT SIDED STROKE WITH PAIN   LENGTH :;2 X PER WEEK   SPECIAL:  4 WEEKS   WANTS DEEP MASSAGE  AND PHYSICAL THERAPY PER PATIENT REQUEST       Treatment: Evaluation & Treatment  Special Instructions/Modalities:  DEEP MASSAGE  TENS UNIT   HOME EXERCISE PROGRAM PLEASE   Special Programs:       Please be aware that coverage of these services is subject to the terms and limitations of your health insurance plan.  Call member services at your health plan with any benefit or coverage questions.      **Note to Provider** To refer patients to therapy outside of the location list, change the order class to External Referral in the order composer.            UROLOGY ADULT REFERRAL       Your provider has referred you to: [facility]: [facility] [phone]    [email or web address]  WORRIED ABOUT CONCEIVING WHEN GOING TO [place] IN June   ON TAMSULOSIN FOR BENIGN PROSTATIC HYPERTROPHY SYMPTOMS DECREASE FERTILITY IS KNOWN WITH THIS MEDICINE   BUT IF STOPS BENIGN PROSTATIC HYPERTROPHY SYMPTOMS RETURN AND DIFFICULTY EMPTYING BLADDER     MONET ALLEN JR., MD     Please be aware that coverage of these services is subject to the terms and limitations of your health insurance plan.  Call member services at your health plan with any benefit or coverage questions.      Please bring the following with you to your appointment:    (1) Any X-Rays, CTs or MRIs which have been performed.  Contact the facility where they were done to arrange for  prior to your scheduled appointment.    (2) List of current medications  (3) This referral request   (4) Any documents/labs given to you for this referral                  Who to contact     If you have questions or need follow up information about today's clinic visit or your schedule please contact [facility] directly at [phone].  Normal or non-critical lab and imaging results will be communicated to you by [employer], letter or phone within 4 business days after the clinic has received the results. If you do not hear from us within 7 days, please contact the clinic through [employer] or phone. If you have a critical or abnormal lab result, we will notify you by phone as soon as possible.  Submit refill requests through [employer] or call your pharmacy and they will forward the refill request to us. Please allow 3 business days for your refill to be completed.          Additional Information About Your Visit        [employer] Information     [employer] lets you send messages to your doctor, view your test results, renew your prescriptions, schedule appointments and more. To sign up, go to [email or web address] . Click on \"Log in\" on the left side of the screen, which will " "take you to the Welcome page. Then click on \"Sign up Now\" on the right side of the page.     You will be asked to enter the access code listed below, as well as some personal information. Please follow the directions to create your username and password.     Your access code is: [email or web address]  Expires: 2018  9:21 AM     Your access code will  in 90 days. If you need help or a new code, please call your [facility] clinic or [phone].        Care EveryWhere ID     This is your Care EveryWhere ID. This could be used by other organizations to access your [facility] medical records  [id number]        Your Vitals Were     Pulse Temperature Respirations Pulse Oximetry BMI (Body Mass Index)       87 97  F (36.1  C) (Tympanic) 16 99% 24.16 kg/m2        Blood Pressure from Last 3 Encounters:   18 124/74   18 136/74   18 (!) 140/94    Weight from Last 3 Encounters:   18 166 lb (75.3 kg)   18 165 lb (74.8 kg)   18 162 lb 14.7 oz (73.9 kg)              We Performed the Following     Albumin Random Urine Quantitative with Creat Ratio     Basic metabolic panel     FOOT EXAM  NO CHARGE [43177.114]     Hemoglobin A1c     Lipid panel reflex to direct LDL Fasting     MED THERAPY MANAGE REFERRAL     PHYSICAL THERAPY REFERRAL     TSH WITH FREE T4 REFLEX     UROLOGY ADULT REFERRAL          Today's Medication Changes          These changes are accurate as of 18  9:21 AM.  If you have any questions, ask your nurse or doctor.               Start taking these medicines.        Dose/Directions    order for DME   Used for:  Type 2 diabetes mellitus, uncontrolled, with neuropathy (H)   Started by:  Monet Allen MD        Equipment being ordered:  BILATERAL  LONGITUDINAL ARCH SUPPORTS  DOES NOT QUALIFY FOR SHOES  HE HAS ADVANCED NEUROPATHY BUT NO CALLOSITIES  MONET ALLEN JR., MD   Quantity:  1 each   Refills:  3       pregabalin 50 MG capsule   Commonly known as:  LYRICA "   Used for:  Cerebral infarction due to embolism of left middle cerebral artery (H)   Started by:  Hamlet Tavares MD        Dose:  50 mg   Take 1 capsule (50 mg) by mouth 3 times daily   Quantity:  90 capsule   Refills:  2            Where to get your medicines      Some of these will need a paper prescription and others can be bought over the counter.  Ask your nurse if you have questions.     Bring a paper prescription for each of these medications     order for DME    pregabalin 50 MG capsule                Primary Care Provider Office Phone # Fax #    Hamlet Tavares [phone] [address] [facility] [address]        Equal Access to Services     [facility]: Hadii michael oneal hadasho Soubaldo, waaxda luqadaha, qaybta kaalmada adeegyada, javier moore . [facility] [phone].    ATENCIÓN: Si habla español, tiene a mathur disposición servicios gratuitos de asistencia lingüística. Llame al [phone].    We comply with applicable federal civil rights laws and [place] laws. We do not discriminate on the basis of race, color, national origin, age, disability, sex, sexual orientation, or gender identity.            Thank you!     Thank you for choosing [facility]  for your care. Our goal is always to provide you with excellent care. Hearing back from our patients is one way we can continue to improve our services. Please take a few minutes to complete the written survey that you may receive in the mail after your visit with us. Thank you!             Your Updated Medication List - Protect others around you: Learn how to safely use, store and throw away your medicines at www.disposemymeds.org.          This list is accurate as of 5/22/18  9:21 AM.  Always use your most recent med list.                   Brand Name Dispense Instructions for use Diagnosis    acetaminophen 650 MG CR tablet    MAPAP ARTHRITIS PAIN     270 tablet    TAKE 1 TABLET (650 MG) BY MOUTH 3 TIMES DAILY Profile Rx: patient will contact pharmacy when needed    Chronic radicular low back pain       atorvastatin 20 MG tablet    LIPITOR    90 tablet    Take 1 tablet (20 mg) by mouth daily    Hyperlipidemia LDL goal <100       blood glucose calibration solution     1 Bottle    Use to calibrate blood glucose monitor as directed.    Type 2 diabetes mellitus with diabetic polyneuropathy, without long-term current use of insulin (H)       blood glucose monitoring test strip    no brand specified    100 each    1 strip by In Vitro route daily    Cough       clopidogrel 75 MG tablet    PLAVIX    30 tablet    Take 1 tablet (75 mg) by mouth daily    H/O: stroke with residual effects       diclofenac 1 % Gel topical gel    VOLTAREN    100 g    APPLY 4 GRAMS TO KNEES OR 2 GRAMS TO HANDS BY TOPICAL ROUTE FOUR TIMES DAILY USING ENCLOSED DOSING CARD Profile Rx: patient will contact pharmacy when needed    Pain in joint, pelvic region and thigh, unspecified laterality       glimepiride 4 MG tablet    AMARYL    180 tablet    Take 1 tablet (4 mg) by mouth two times daily    Type 2 diabetes mellitus with diabetic polyneuropathy, without long-term current use of insulin (H)       losartan-hydrochlorothiazide 100-12.5 MG per tablet    HYZAAR    30 tablet    Take 1 tablet by mouth daily    Essential hypertension with goal blood pressure less than 140/90       metoprolol succinate 25 MG 24 hr tablet    TOPROL-XL    90 tablet    TAKE 1 TABLET (25 MG) BY MOUTH DAILY Profile Rx: patient will contact pharmacy when needed    Hypertension goal BP (blood pressure) < 140/90       order for DME     1 each    Equipment being ordered:  BILATERAL  LONGITUDINAL ARCH SUPPORTS  DOES NOT QUALIFY FOR SHOES  HE HAS ADVANCED NEUROPATHY BUT NO CALLOSITIES  MONET ALLEN JR., MD    Type 2 diabetes mellitus, uncontrolled, with neuropathy (H)       pregabalin 50 MG capsule    LYRICA    90 capsule     Take 1 capsule (50 mg) by mouth 3 times daily    Cerebral infarction due to embolism of left middle cerebral artery (H)       sitagliptin 100 MG tablet    JANUVIA    90 tablet    TAKE 1 TABLET (100 MG) BY MOUTH DAILY Profile Rx: patient will contact pharmacy when needed    Type 2 diabetes mellitus, uncontrolled, with neuropathy (H)       Sodium Fluoride 1.1 % Crea    DENTA 5000 PLUS    51 g    USE FOR DAILY BRUSHING Profile Rx: patient will contact pharmacy when needed    Tooth pain       tamsulosin 0.4 MG capsule    FLOMAX    90 capsule    Take 1 capsule (0.4 mg) by mouth daily    Benign non-nodular prostatic hyperplasia with lower urinary tract symptoms

## 2018-05-22 NOTE — LETTER
[facility]  [address]  [address]  [place] [place] [address]  [phone]                                                                                                           Vikram Glez [place]  [address]  [facility] [address]    May 22, 2018    CONCERNIN  Dear Vikram,    PLANS TO BE GONE IN June OR July FOR 3 MONTHS   WOULD LIKE TO KEEP HIS APARTMENT.  HISTORY OF STROKE WITH RESIDUAL LEFT SIDED               Thank you for choosing [facility].  We appreciate the opportunity to serve you and look forward to supporting your healthcare needs in the future.    If you have any questions or concerns, please call me or my staff at [phone].      Sincerely,    Hamlet Tavares Jr MD

## 2018-05-22 NOTE — PATIENT INSTRUCTIONS
(Z13.89) Screening for diabetic peripheral neuropathy  (primary encounter diagnosis)  Comment:    Plan: FOOT EXAM  NO CHARGE [89666.114]             (I63.412) Cerebral infarction due to embolism of left middle cerebral artery (H)  Comment: left sided arm and leg pain referactory to gabapentine]  Plan: TSH WITH FREE T4 REFLEX, pregabalin (LYRICA) 50        MG capsule             (E11.40,  E11.65) Type 2 diabetes mellitus, uncontrolled, with neuropathy (H)  Comment:    Plan: order for DME             (E78.5) Hyperlipidemia LDL goal <100  Comment:    Plan:      (I10) Hypertension goal BP (blood pressure) < 140/90  Comment:    Plan:      (I69.30) H/O: stroke with residual effects LEFT ARM AND LEG  Comment:    Plan: PHYSICAL THERAPY REFERRAL, CANCELED: [facility] PT,         HAND, AND CHIROPRACTIC REFERRAL

## 2018-05-22 NOTE — PROGRESS NOTES
SUBJECTIVE:   Vikram Rodgers is a 48 year old male who presents to clinic today for the following health issues:      Follow up from stroke      Duration: 2/8/2017    Description (location/character/radiation): pain along left side    Intensity:  moderate    Accompanying signs and symptoms: cramping    History (similar episodes/previous evaluation): yes    Precipitating or alleviating factors: None    Therapies tried and outcome: None       Diabetes Follow-up      Patient is checking blood sugars: occasional     Diabetic concerns: blood sugar frequently over 200     Symptoms of hypoglycemia (low blood sugar): none     Paresthesias (numbness or burning in feet) or sores: Yes      Date of last diabetic eye exam: last year    BP Readings from Last 2 Encounters:   04/03/18 136/74   02/16/18 (!) 140/94     Hemoglobin A1C (%)   Date Value   04/03/2018 11.6 (H)   01/23/2018 10.1 (H)     LDL Cholesterol Calculated (mg/dL)   Date Value   10/24/2017 172 (H)   09/22/2016 145 (H)     LDL Cholesterol Direct (mg/dL)   Date Value   01/23/2018 164 (H)                 Topic Date Due     FOOT EXAM Q1 YEAR  06/28/2017     EYE EXAM Q1 YEAR  08/22/2017     TSH W/ FREE T4 REFLEX Q2 YEAR  02/15/2018               .  Current Outpatient Prescriptions   Medication Sig Dispense Refill     acetaminophen (MAPAP ARTHRITIS PAIN) 650 MG CR tablet TAKE 1 TABLET (650 MG) BY MOUTH 3 TIMES DAILY Profile Rx: patient will contact pharmacy when needed 270 tablet 0     atorvastatin (LIPITOR) 20 MG tablet Take 1 tablet (20 mg) by mouth daily 90 tablet 0     blood glucose calibration (ONETOUCH ULTRA CONTROL) solution Use to calibrate blood glucose monitor as directed. 1 Bottle 11     blood glucose monitoring (NO BRAND SPECIFIED) test strip 1 strip by In Vitro route daily 100 each 11     clopidogrel (PLAVIX) 75 MG tablet Take 1 tablet (75 mg) by mouth daily 30 tablet 11     diclofenac (VOLTAREN) 1 % GEL topical gel APPLY 4 GRAMS TO KNEES OR 2 GRAMS TO  HANDS BY TOPICAL ROUTE FOUR TIMES DAILY USING ENCLOSED DOSING CARD Profile Rx: patient will contact pharmacy when needed 100 g 0     glimepiride (AMARYL) 4 MG tablet Take 1 tablet (4 mg) by mouth two times daily 180 tablet 11     losartan-hydrochlorothiazide (HYZAAR) 100-12.5 MG per tablet Take 1 tablet by mouth daily 30 tablet 11     metoprolol succinate (TOPROL-XL) 25 MG 24 hr tablet TAKE 1 TABLET (25 MG) BY MOUTH DAILY Profile Rx: patient will contact pharmacy when needed 90 tablet 3     order for DME Equipment being ordered:  BILATERAL  LONGITUDINAL ARCH SUPPORTS   DOES NOT QUALIFY FOR SHOES   HE HAS ADVANCED NEUROPATHY BUT NO CALLOSITIES   MONET ALLEN JR., MD 1 each 3     pregabalin (LYRICA) 50 MG capsule Take 1 capsule (50 mg) by mouth 3 times daily 90 capsule 2     sitagliptin (JANUVIA) 100 MG tablet TAKE 1 TABLET (100 MG) BY MOUTH DAILY  Profile Rx: patient will contact pharmacy when needed 90 tablet 0     Sodium Fluoride (DENTA 5000 PLUS) 1.1 % CREA USE FOR DAILY BRUSHING  Profile Rx: patient will contact pharmacy when needed 51 g 5     tamsulosin (FLOMAX) 0.4 MG capsule Take 1 capsule (0.4 mg) by mouth daily 90 capsule 11              No Known Allergies      Immunization History   Administered Date(s) Administered     Influenza (IIV3) PF 10/04/2010, 2013     Influenza Vaccine IM 3yrs+ 4 Valent IIV4 10/06/2014     Pneumococcal 23 valent 2013     TD (ADULT, 7+) 2010               reports that he does not drink alcohol.          reports that he does not use illicit drugs.        family history includes Asthma in his father; C.A.D. in his father; DIABETES in his father; Hypertension in his father; Neurologic Disorder in his brother; Unknown/Adopted in his mother. There is no history of Cancer - colorectal.        indicated that the status of his no family hx of is unknown. He indicated that the status of his mother is unknown. He indicated that his father is . He indicated that  the status of his brother is unknown.          has a past surgical history that includes ENT surgery.         reports that he does not engage in sexual activity.    .  Pediatric History   Patient Guardian Status     Not on file.     Other Topics Concern     Parent/Sibling W/ Cabg, Mi Or Angioplasty Before 65f 55m? No     unknown     Social History Narrative               reports that he quit smoking about 15 months ago. His smoking use included Cigarettes. He smoked 0.25 packs per day. He has never used smokeless tobacco.        Medical, social, surgical, and family histories reviewed.        Labs reviewed in EPIC  Patient Active Problem List   Diagnosis     Latent tuberculosis infection     PUD (peptic ulcer disease)     Moderate major depression (H)     Unspecified hyperplasia of prostate with urinary obstruction and other lower urinary tract symptoms (LUTS)     Carpal tunnel syndrome     Disturbance of skin sensation     Vitamin D deficiency     Paranoid schizophrenia (H)     Nocturia     Schizophrenia (H)     Hallucinations     Positive PPD     Hyperlipidemia     Tobacco use disorder     Gastroesophageal reflux disease without esophagitis     Health Care Home     Major depression in partial remission (H)     Hyperlipidemia LDL goal <100     Type 2 diabetes mellitus, uncontrolled, with neuropathy (H)     Hypertension goal BP (blood pressure) < 140/90     COPD (chronic obstructive pulmonary disease) (H)     Pain in joint, pelvic region and thigh     Tooth pain     Enlarged prostate with lower urinary tract symptoms (LUTS)     H/O: stroke with residual effects LEFT ARM AND LEG       Past Surgical History:   Procedure Laterality Date     ENT SURGERY      sinuses         Social History   Substance Use Topics     Smoking status: Former Smoker     Packs/day: 0.25     Types: Cigarettes     Quit date: 2/16/2017     Smokeless tobacco: Never Used     Alcohol use No       Family History   Problem Relation Age of Onset      Asthma Father      DIABETES Father      Hypertension Father      C.A.D. Father      Neurologic Disorder Brother      Unknown/Adopted Mother      Cancer - colorectal No family hx of              Current Outpatient Prescriptions   Medication Sig Dispense Refill     acetaminophen (MAPAP ARTHRITIS PAIN) 650 MG CR tablet TAKE 1 TABLET (650 MG) BY MOUTH 3 TIMES DAILY Profile Rx: patient will contact pharmacy when needed 270 tablet 0     atorvastatin (LIPITOR) 20 MG tablet Take 1 tablet (20 mg) by mouth daily 90 tablet 0     blood glucose calibration (ONETOUCH ULTRA CONTROL) solution Use to calibrate blood glucose monitor as directed. 1 Bottle 11     blood glucose monitoring (NO BRAND SPECIFIED) test strip 1 strip by In Vitro route daily 100 each 11     clopidogrel (PLAVIX) 75 MG tablet Take 1 tablet (75 mg) by mouth daily 30 tablet 11     diclofenac (VOLTAREN) 1 % GEL topical gel APPLY 4 GRAMS TO KNEES OR 2 GRAMS TO HANDS BY TOPICAL ROUTE FOUR TIMES DAILY USING ENCLOSED DOSING CARD Profile Rx: patient will contact pharmacy when needed 100 g 0     glimepiride (AMARYL) 4 MG tablet Take 1 tablet (4 mg) by mouth two times daily 180 tablet 11     losartan-hydrochlorothiazide (HYZAAR) 100-12.5 MG per tablet Take 1 tablet by mouth daily 30 tablet 11     metoprolol succinate (TOPROL-XL) 25 MG 24 hr tablet TAKE 1 TABLET (25 MG) BY MOUTH DAILY Profile Rx: patient will contact pharmacy when needed 90 tablet 3     order for DME Equipment being ordered:  BILATERAL  LONGITUDINAL ARCH SUPPORTS   DOES NOT QUALIFY FOR SHOES   HE HAS ADVANCED NEUROPATHY BUT NO CALLOSITIES   MONET ALLEN JR., MD 1 each 3     pregabalin (LYRICA) 50 MG capsule Take 1 capsule (50 mg) by mouth 3 times daily 90 capsule 2     sitagliptin (JANUVIA) 100 MG tablet TAKE 1 TABLET (100 MG) BY MOUTH DAILY  Profile Rx: patient will contact pharmacy when needed 90 tablet 0     Sodium Fluoride (DENTA 5000 PLUS) 1.1 % CREA USE FOR DAILY BRUSHING  Profile Rx: patient  will contact pharmacy when needed 51 g 5     tamsulosin (FLOMAX) 0.4 MG capsule Take 1 capsule (0.4 mg) by mouth daily 90 capsule 11           Recent Labs   Lab Test  04/03/18   1623  01/23/18   1418  10/24/17   1637  09/22/16   1637  02/15/16   1655   04/08/13   1232   A1C  11.6*  10.1*  11.4*  9.0*   --    < >  11.3*   LDL   --   164*  172*  145*  168*   < >  153*   HDL   --    --   41  39*  34*   < >  32*   TRIG   --    --   170*  264*  178*   < >  338*   ALT   --    --   50  44  39   < >  44   CR   --   0.60*  0.60*  0.85  0.70   < >  0.80   GFRESTIMATED   --   >90  >90  >90  >90   < >  >90   GFRESTBLACK   --   >90  >90  >90  >90   < >  >90   POTASSIUM   --   3.9  4.2  4.8  4.3   < >  4.2   TSH   --    --    --    --   1.01  1.01   --   1.87    < > = values in this interval not displayed.            BP Readings from Last 6 Encounters:   05/22/18 124/74   04/03/18 136/74   02/16/18 (!) 140/94   02/12/18 144/70   01/23/18 112/64   11/24/17 124/82           Wt Readings from Last 3 Encounters:   05/22/18 166 lb (75.3 kg)   04/03/18 165 lb (74.8 kg)   02/16/18 162 lb 14.7 oz (73.9 kg)                 Positive symptoms or findings indicated by bold designation:         ROS: 10 point ROS neg other than the symptoms noted above in the HPI.except  has Latent tuberculosis infection; PUD (peptic ulcer disease); Moderate major depression (H); Unspecified hyperplasia of prostate with urinary obstruction and other lower urinary tract symptoms (LUTS); Carpal tunnel syndrome; Disturbance of skin sensation; Vitamin D deficiency; Paranoid schizophrenia (H); Nocturia; Schizophrenia (H); Hallucinations; Positive PPD; Hyperlipidemia; Tobacco use disorder; Gastroesophageal reflux disease without esophagitis; Health Care Home; Major depression in partial remission (H); Hyperlipidemia LDL goal <100; Type 2 diabetes mellitus, uncontrolled, with neuropathy (H); Hypertension goal BP (blood pressure) < 140/90; COPD (chronic obstructive  pulmonary disease) (H); Pain in joint, pelvic region and thigh; Tooth pain; Enlarged prostate with lower urinary tract symptoms (LUTS); and H/O: stroke with residual effects LEFT ARM AND LEG on his problem list.  Review Of Systems    HISTORY OF LATENT TB     HISTORY PUD     PARANOID SCHIZOPHRENIA AND with DEPRESSIVE FORMAT     TOBACCO USER     LEFT HEMIPLEGIA     CHRONIC BRONCHITIS AND CHRONIC OBSTRUCTIVE LUNG DISEASE     ENLARGE PROSTATE with SYMPTOMS     Skin: negative    Eyes: negative    Ears/Nose/Throat: negative    Respiratory: No shortness of breath, dyspnea on exertion, cough, or hemoptysis    CHRONIC OBSTRUCTIVE LUNG DISEASE     Cardiovascular: negative    Gastrointestinal:  OCCASIONAL GASTROESOPHAGEAL REFLUX DISEASE     Genitourinary: negative    Musculoskeletal: back pain and arthritis    Neurologic: negative    Psychiatric: SCHIZOAFFECTIVE WITH PARANOID AND DEPRESSIVE ASPECTS     Hematologic/Lymphatic/Immunologic: negative    Endocrine: diabetes                PE:  /74 (Cuff Size: Adult Regular)  Pulse 87  Temp 97  F (36.1  C) (Tympanic)  Resp 16  Wt 166 lb (75.3 kg)  SpO2 99%  BMI 24.16 kg/m2 Body mass index is 24.16 kg/(m^2).        Constitutional: general appearance, well nourished, well developed, in no acute distress, well developed, appears stated age, normal body habitus,          Eyes:; The patient has normal eyelids sclerae and conjunctivae :          Ears/Nose/Throat: external ear, overall: normal appearance; external nose, overall: benign appearance, normal moujth gums and lips           Neck: thyroid, overall: normal size, normal consistency, nontender,          Respiratory:  palpation of chest, overall: normal excursion,    Clear to percussion and auscultation     NO Tachypnea    NORMAL  Color          Cardiovascular:  Good color with no peripheral edema    Regular sinus rhythm without murmur.  Physiologic heart sounds   Heart is unelarged    .     Chest/Breast: normal shape            Abdominal exam,  Liver and spleen are  unenlarged        Tenderness    Scars              Urogenital; no renal, flank or bladder  tenderness;          Lymphatic: neck nodes,     Other nodes         Musculoskeletal:  Brief ortho exam normal except:   LOWER BACK PAIN AND NECK AND UPPER BACK AND SHOULDER  PAIN    RADICULOPATHY LEFT ARM AND LEG         Integument: inspection of skin, no rash, lesions; and, palpation, no induration, no tenderness.          Neurologic mental status, overall: alert and oriented; gait, no ataxia, no unsteadiness; coordination, no tremors; cranial nerves, overall: normal motor, overall: normal bulk, tone.          Psychiatric: orientation/consciousness, overall: oriented to person, place and time; behavior/psychomotor activity, no tics, normal psychomotor activity; mood and affect, overall: normal mood and affect; appearance, overall: well-groomed, good eye contact; speech, overall: normal quality, no aphasia and normal quality, quantity, intact.        Diagnostic Test Results:  Results for orders placed or performed in visit on 04/03/18   Hemoglobin A1c   Result Value Ref Range    Hemoglobin A1C 11.6 (H) 0 - 6.4 %           ICD-10-CM    1. Type 2 diabetes mellitus, uncontrolled, with neuropathy (H) E11.40 order for DME    E11.65 Basic metabolic panel   2. Screening for diabetic peripheral neuropathy Z13.89 FOOT EXAM  NO CHARGE [46161.114]   3. Cerebral infarction due to embolism of left middle cerebral artery (H) I63.412 TSH WITH FREE T4 REFLEX     pregabalin (LYRICA) 50 MG capsule     Albumin Random Urine Quantitative with Creat Ratio     Hemoglobin A1c    left sided arm and leg pain referactory to gabapentine]   4. Hyperlipidemia LDL goal <100 E78.5 Lipid panel reflex to direct LDL Fasting   5. Hypertension goal BP (blood pressure) < 140/90 I10    6. H/O: stroke with residual effects LEFT ARM AND LEG I69.30 PHYSICAL THERAPY REFERRAL     CANCELED: [facility], HAND, AND CHIROPRACTIC  REFERRAL   7. Infertility male N46.9 UROLOGY ADULT REFERRAL              .    Side effects benefits and risks thoroughly discussed. .he may come in early if unimproved or getting worse          Please drink 2 glasses of water prior to meals and walk 15-30 minutes after meals        I spent  25 MINUTES SPENT  with patient discussing the following issues   The primary encounter diagnosis was Type 2 diabetes mellitus, uncontrolled, with neuropathy (H). Diagnoses of Screening for diabetic peripheral neuropathy, Cerebral infarction due to embolism of left middle cerebral artery (H), Hyperlipidemia LDL goal <100, Hypertension goal BP (blood pressure) < 140/90, H/O: stroke with residual effects LEFT ARM AND LEG, and Infertility male were also pertinent to this visit. over half of which involved counseling and coordination of care.      Patient Instructions   (Z13.89) Screening for diabetic peripheral neuropathy  (primary encounter diagnosis)  Comment:    Plan: FOOT EXAM  NO CHARGE [24651.114]             (I63.412) Cerebral infarction due to embolism of left middle cerebral artery (H)  Comment: left sided arm and leg pain referactory to gabapentine]  Plan: TSH WITH FREE T4 REFLEX, pregabalin (LYRICA) 50        MG capsule             (E11.40,  E11.65) Type 2 diabetes mellitus, uncontrolled, with neuropathy (H)  Comment:    Plan: order for DME             (E78.5) Hyperlipidemia LDL goal <100  Comment:    Plan:      (I10) Hypertension goal BP (blood pressure) < 140/90  Comment:    Plan:      (I69.30) H/O: stroke with residual effects LEFT ARM AND LEG  Comment:    Plan: PHYSICAL THERAPY REFERRAL, CANCELED: [facility],         HAND, AND CHIROPRACTIC REFERRAL                             ALL THE ABOVE PROBLEMS ARE STABLE AND MED CHANGES AS NOTED        Diet:  MEDITERRANEAN DIET  AND DIABETES         Exercise:  90 MINUTES OF EXERCISE PER DAY RECOMMENDED    Exercises Range of motion, balance, isometric, and strengthening exercises 30  repetitions twice daily of involved joints            .MONET ALLEN MD 5/22/2018 1:17 PM  May 22, 2018

## 2018-05-23 ENCOUNTER — TELEPHONE (OUTPATIENT)
Dept: PHARMACY | Facility: OTHER | Age: 48
End: 2018-05-23

## 2018-05-23 NOTE — TELEPHONE ENCOUNTER
MTM referral from: [facility] visit (referral by provider)    MTM referral outreach attempt #1 on May 23, 2018 at 2:11 PM      Outcome: Patient is not interested at this time because he said he is not able to leave his bed and he does not want to come anywhere to talk to MTM., will route to MTM Pharmacist/Provider as an FYI. Thank you for the referral.     Ruthy Lindsay, MTM Coordinator

## 2018-06-01 ENCOUNTER — OFFICE VISIT (OUTPATIENT)
Dept: FAMILY MEDICINE | Facility: CLINIC | Age: 48
End: 2018-06-01
Payer: MEDICAID

## 2018-06-01 VITALS
RESPIRATION RATE: 16 BRPM | TEMPERATURE: 96.4 F | DIASTOLIC BLOOD PRESSURE: 76 MMHG | WEIGHT: 165 LBS | BODY MASS INDEX: 24.02 KG/M2 | SYSTOLIC BLOOD PRESSURE: 124 MMHG | OXYGEN SATURATION: 100 % | HEART RATE: 76 BPM

## 2018-06-01 DIAGNOSIS — I69.30 H/O: STROKE WITH RESIDUAL EFFECTS: Chronic | ICD-10-CM

## 2018-06-01 DIAGNOSIS — I63.412 CEREBRAL INFARCTION DUE TO EMBOLISM OF LEFT MIDDLE CEREBRAL ARTERY (H): ICD-10-CM

## 2018-06-01 DIAGNOSIS — M54.16 CHRONIC RADICULAR LOW BACK PAIN: ICD-10-CM

## 2018-06-01 DIAGNOSIS — F20.0 PARANOID SCHIZOPHRENIA (H): ICD-10-CM

## 2018-06-01 DIAGNOSIS — I10 HYPERTENSION GOAL BP (BLOOD PRESSURE) < 140/90: Chronic | ICD-10-CM

## 2018-06-01 DIAGNOSIS — I10 ESSENTIAL HYPERTENSION WITH GOAL BLOOD PRESSURE LESS THAN 140/90: ICD-10-CM

## 2018-06-01 DIAGNOSIS — N40.1 BENIGN NON-NODULAR PROSTATIC HYPERPLASIA WITH LOWER URINARY TRACT SYMPTOMS: ICD-10-CM

## 2018-06-01 DIAGNOSIS — E78.5 HYPERLIPIDEMIA LDL GOAL <100: Chronic | ICD-10-CM

## 2018-06-01 DIAGNOSIS — G89.29 CHRONIC RADICULAR LOW BACK PAIN: ICD-10-CM

## 2018-06-01 DIAGNOSIS — E11.42 TYPE 2 DIABETES MELLITUS WITH DIABETIC POLYNEUROPATHY, WITHOUT LONG-TERM CURRENT USE OF INSULIN (H): ICD-10-CM

## 2018-06-01 PROCEDURE — T1013 SIGN LANG/ORAL INTERPRETER: HCPCS | Mod: U3 | Performed by: FAMILY MEDICINE

## 2018-06-01 PROCEDURE — 99214 OFFICE O/P EST MOD 30 MIN: CPT | Performed by: FAMILY MEDICINE

## 2018-06-01 RX ORDER — GLIMEPIRIDE 4 MG/1
4 TABLET ORAL
Qty: 180 TABLET | Refills: 11 | Status: SHIPPED | OUTPATIENT
Start: 2018-06-01

## 2018-06-01 RX ORDER — GLIMEPIRIDE 4 MG/1
4 TABLET ORAL
Qty: 180 TABLET | Refills: 11 | Status: SHIPPED | OUTPATIENT
Start: 2018-06-01 | End: 2018-06-01

## 2018-06-01 RX ORDER — PREGABALIN 50 MG/1
50 CAPSULE ORAL 3 TIMES DAILY
Qty: 90 CAPSULE | Refills: 2 | Status: SHIPPED | OUTPATIENT
Start: 2018-06-01 | End: 2018-06-18

## 2018-06-01 RX ORDER — LOSARTAN POTASSIUM AND HYDROCHLOROTHIAZIDE 12.5; 1 MG/1; MG/1
1 TABLET ORAL DAILY
Qty: 30 TABLET | Refills: 11 | Status: SHIPPED | OUTPATIENT
Start: 2018-06-01

## 2018-06-01 RX ORDER — TAMSULOSIN HYDROCHLORIDE 0.4 MG/1
0.4 CAPSULE ORAL DAILY
Qty: 90 CAPSULE | Refills: 11 | Status: SHIPPED | OUTPATIENT
Start: 2018-06-01

## 2018-06-01 RX ORDER — METOPROLOL SUCCINATE 25 MG/1
TABLET, EXTENDED RELEASE ORAL
Qty: 90 TABLET | Refills: 3 | Status: SHIPPED | OUTPATIENT
Start: 2018-06-01

## 2018-06-01 RX ORDER — SENNOSIDES 8.6 MG
CAPSULE ORAL
Qty: 270 TABLET | Refills: 0 | Status: SHIPPED | OUTPATIENT
Start: 2018-06-01 | End: 2019-06-21

## 2018-06-01 RX ORDER — CLOPIDOGREL BISULFATE 75 MG/1
75 TABLET ORAL DAILY
Qty: 30 TABLET | Refills: 11 | Status: SHIPPED | OUTPATIENT
Start: 2018-06-01

## 2018-06-01 RX ORDER — ATORVASTATIN CALCIUM 20 MG/1
20 TABLET, FILM COATED ORAL DAILY
Qty: 90 TABLET | Refills: 0 | Status: SHIPPED | OUTPATIENT
Start: 2018-06-01

## 2018-06-01 RX ORDER — RISPERIDONE 1 MG/1
2 TABLET ORAL AT BEDTIME
Qty: 90 TABLET | Refills: 3 | Status: SHIPPED | OUTPATIENT
Start: 2018-06-01

## 2018-06-01 NOTE — MR AVS SNAPSHOT
After Visit Summary   6/1/2018    Vikram Rodgers    MRN: [id number]           Patient Information     Date Of Birth          1970        Visit Information        Provider Department      6/1/2018 10:00 AM Hamlet Tavares MD; [employer] [facility]        Today's Diagnoses     Type 2 diabetes mellitus, uncontrolled, with neuropathy (H)    -  1    Paranoid schizophrenia (H)        Chronic radicular low back pain        Type 2 diabetes mellitus with diabetic polyneuropathy, without long-term current use of insulin (H)        Hyperlipidemia LDL goal <100        H/O: stroke with residual effects LEFT ARM AND LEG        Essential hypertension with goal blood pressure less than 140/90        Hypertension goal BP (blood pressure) < 140/90        Benign non-nodular prostatic hyperplasia with lower urinary tract symptoms        Cerebral infarction due to embolism of left middle cerebral artery (H)          Care Instructions      A1C 5.0 AVERAGE GLUCOSE   90    A1C 6.0 AVERAGE GLUCOSE 120    A1C 6.5 AVERAGE GLUCOSE 135    A1C 6.8 AVERAGE GLUCOSE 144    A1C 7.0 AVERAGE GLUCOSE 150    A1C 8.0 AVERAGE GLUCOSE 180    A1C 9.0 AVERAGE GLUCOSE 200    A1C 10. AVERAGE GLUCOSE 230    A1C 11. AVERAGE GLUCOSE 269    A1C 12 AVERAGE GLUCOSE  298    A1C 13 AVERAGE GLUCOSE  326    A1C 14 AVERAGE GLUCOSE  355  (E11.40,  E11.65) Type 2 diabetes mellitus, uncontrolled, with neuropathy (H)  (primary encounter diagnosis)  Comment:    Plan: sitagliptin (JANUVIA) 100 MG tablet             (F20.0) Paranoid schizophrenia (H)  Comment:    Plan: risperiDONE (RISPERDAL) 1 MG tablet             (M54.16,  G89.29) Chronic radicular low back pain  Comment:    Plan: acetaminophen (MAPAP ARTHRITIS PAIN) 650 MG CR         tablet             (E11.42) Type 2 diabetes mellitus with diabetic polyneuropathy, without long-term current use of insulin (H)  Comment:    Plan: glimepiride  (AMARYL) 4 MG tablet, DISCONTINUED:        glimepiride (AMARYL) 4 MG tablet             (E78.5) Hyperlipidemia LDL goal <100  Comment:         Plan: atorvastatin (LIPITOR) 20 MG tablet             (I69.30) H/O: stroke with residual effects LEFT ARM AND LEG  Comment:    Plan: clopidogrel (PLAVIX) 75 MG tablet             (I10) Essential hypertension with goal blood pressure less than 140/90  Comment:    Plan: losartan-hydrochlorothiazide (HYZAAR) 100-12.5         MG per tablet             (I10) Hypertension goal BP (blood pressure) < 140/90  Comment:    Plan: metoprolol succinate (TOPROL-XL) 25 MG 24 hr         tablet             (N40.1) Benign non-nodular prostatic hyperplasia with lower urinary tract symptoms  Comment:    Plan: tamsulosin (FLOMAX) 0.4 MG capsule             (I63.412) Cerebral infarction due to embolism of left middle cerebral artery (H)  Comment: left sided arm and leg pain referactory to gabapentine]  Plan: pregabalin (LYRICA) 50 MG capsule                                   Follow-ups after your visit        Who to contact     If you have questions or need follow up information about today's clinic visit or your schedule please contact [facility] directly at [phone].  Normal or non-critical lab and imaging results will be communicated to you by [employer], letter or phone within 4 business days after the clinic has received the results. If you do not hear from us within 7 days, please contact the clinic through [employer] or phone. If you have a critical or abnormal lab result, we will notify you by phone as soon as possible.  Submit refill requests through [employer] or call your pharmacy and they will forward the refill request to us. Please allow 3 business days for your refill to be completed.          Additional Information About Your Visit        [employer] Information     [employer] lets you send messages to your doctor, view your test results, renew your  "prescriptions, schedule appointments and more. To sign up, go to [email or web address] . Click on \"Log in\" on the left side of the screen, which will take you to the Welcome page. Then click on \"Sign up Now\" on the right side of the page.     You will be asked to enter the access code listed below, as well as some personal information. Please follow the directions to create your username and password.     Your access code is: [email or web address]  Expires: 2018  9:21 AM     Your access code will  in 90 days. If you need help or a new code, please call your [facility] clinic or [phone].        Care EveryWhere ID     This is your Care EveryWhere ID. This could be used by other organizations to access your [facility] medical records  [id number]        Your Vitals Were     Pulse Temperature Respirations Pulse Oximetry BMI (Body Mass Index)       76 96.4  F (35.8  C) (Tympanic) 16 100% 24.02 kg/m2        Blood Pressure from Last 3 Encounters:   18 124/76   18 124/74   18 136/74    Weight from Last 3 Encounters:   18 165 lb (74.8 kg)   18 166 lb (75.3 kg)   18 165 lb (74.8 kg)              Today, you had the following     No orders found for display         Today's Medication Changes          These changes are accurate as of 18 10:39 AM.  If you have any questions, ask your nurse or doctor.               Start taking these medicines.        Dose/Directions    glimepiride 4 MG tablet   Commonly known as:  AMARYL   Used for:  Type 2 diabetes mellitus with diabetic polyneuropathy, without long-term current use of insulin (H)   Started by:  Hamlet Tavares MD        Dose:  4 mg   Take 1 tablet (4 mg) by mouth two times daily   Quantity:  180 tablet   Refills:  11       risperiDONE 1 MG tablet   Commonly known as:  risperDAL   Used for:  Paranoid schizophrenia (H)   Started by:  Hamlet Tavares MD        Dose:  2 mg   Take 2 tablets (2 mg) by mouth At Bedtime "   Quantity:  90 tablet   Refills:  3            Where to get your medicines      These medications were sent to [facility] [address] [place] [facility] - [place], [place] - [address]  [address] [address]     Phone:  [phone]     acetaminophen 650 MG CR tablet    atorvastatin 20 MG tablet    clopidogrel 75 MG tablet    losartan-hydrochlorothiazide 100-12.5 MG per tablet    metoprolol succinate 25 MG 24 hr tablet    risperiDONE 1 MG tablet    tamsulosin 0.4 MG capsule         Some of these will need a paper prescription and others can be bought over the counter.  Ask your nurse if you have questions.     Bring a paper prescription for each of these medications     glimepiride 4 MG tablet    pregabalin 50 MG capsule    sitagliptin 100 MG tablet                Primary Care Provider Office Phone # Fax #    Hamlet Tavares [phone] [address] [facility] [address]        Equal Access to Services     [facility] HospitalABRAHAN : Hadii michael oneal hadasho Soomaali, waaxda luqadaha, qaybta kaalmada adeegyada, javier moore . So [facility] [phone].    ATENCIÓN: Si habla español, tiene a mathur disposición servicios gratuitos de asistencia lingüística. Shashaame al [phone].    We comply with applicable federal civil rights laws and [place] laws. We do not discriminate on the basis of race, color, national origin, age, disability, sex, sexual orientation, or gender identity.            Thank you!     Thank you for choosing [facility]  for your care. Our goal is always to provide you with excellent care. Hearing back from our patients is one way we can continue to improve our services. Please take a few minutes to complete the written survey that you may receive in the mail after your visit with us. Thank you!             Your Updated Medication List - Protect others around you: Learn how to safely use, store and throw away your  medicines at www.disposemymeds.org.          This list is accurate as of 6/1/18 10:39 AM.  Always use your most recent med list.                   Brand Name Dispense Instructions for use Diagnosis    acetaminophen 650 MG CR tablet    MAPAP ARTHRITIS PAIN    270 tablet    TAKE 1 TABLET (650 MG) BY MOUTH 3 TIMES DAILY Profile Rx: patient will contact pharmacy when needed    Chronic radicular low back pain       atorvastatin 20 MG tablet    LIPITOR    90 tablet    Take 1 tablet (20 mg) by mouth daily    Hyperlipidemia LDL goal <100       blood glucose calibration solution     1 Bottle    Use to calibrate blood glucose monitor as directed.    Type 2 diabetes mellitus with diabetic polyneuropathy, without long-term current use of insulin (H)       blood glucose monitoring test strip    no brand specified    100 each    1 strip by In Vitro route daily    Cough       clopidogrel 75 MG tablet    PLAVIX    30 tablet    Take 1 tablet (75 mg) by mouth daily    H/O: stroke with residual effects       diclofenac 1 % Gel topical gel    VOLTAREN    100 g    APPLY 4 GRAMS TO KNEES OR 2 GRAMS TO HANDS BY TOPICAL ROUTE FOUR TIMES DAILY USING ENCLOSED DOSING CARD Profile Rx: patient will contact pharmacy when needed    Pain in joint, pelvic region and thigh, unspecified laterality       glimepiride 4 MG tablet    AMARYL    180 tablet    Take 1 tablet (4 mg) by mouth two times daily    Type 2 diabetes mellitus with diabetic polyneuropathy, without long-term current use of insulin (H)       losartan-hydrochlorothiazide 100-12.5 MG per tablet    HYZAAR    30 tablet    Take 1 tablet by mouth daily    Essential hypertension with goal blood pressure less than 140/90       metoprolol succinate 25 MG 24 hr tablet    TOPROL-XL    90 tablet    TAKE 1 TABLET (25 MG) BY MOUTH DAILY Profile Rx: patient will contact pharmacy when needed    Hypertension goal BP (blood pressure) < 140/90       order for DME     1 each    Equipment being ordered:   BILATERAL  LONGITUDINAL ARCH SUPPORTS  DOES NOT QUALIFY FOR SHOES  HE HAS ADVANCED NEUROPATHY BUT NO CALLOSITIES  MONET ALLEN JR., MD    Type 2 diabetes mellitus, uncontrolled, with neuropathy (H)       pregabalin 50 MG capsule    LYRICA    90 capsule    Take 1 capsule (50 mg) by mouth 3 times daily    Cerebral infarction due to embolism of left middle cerebral artery (H)       risperiDONE 1 MG tablet    risperDAL    90 tablet    Take 2 tablets (2 mg) by mouth At Bedtime    Paranoid schizophrenia (H)       sitagliptin 100 MG tablet    JANUVIA    90 tablet    TAKE 1 TABLET (100 MG) BY MOUTH DAILY Profile Rx: patient will contact pharmacy when needed    Type 2 diabetes mellitus, uncontrolled, with neuropathy (H)       Sodium Fluoride 1.1 % Crea    DENTA 5000 PLUS    51 g    USE FOR DAILY BRUSHING Profile Rx: patient will contact pharmacy when needed    Tooth pain       tamsulosin 0.4 MG capsule    FLOMAX    90 capsule    Take 1 capsule (0.4 mg) by mouth daily    Benign non-nodular prostatic hyperplasia with lower urinary tract symptoms

## 2018-06-01 NOTE — PROGRESS NOTES
"  SUBJECTIVE:   Vikram Rodgers is a 48 year old male who presents to clinic today for the following health issues:      STROKE      Duration: 2/8/2017    Description (location/character/radiation): feeling numbness on left side, from head all the way down    Intensity:  Same as before    Accompanying signs and symptoms: na    History (similar episodes/previous evaluation): yes    Precipitating or alleviating factors: None    Therapies tried and outcome: None           .MONET ALLEN MD .6/1/2018 1:05 PM .June 1, 2018        Vikram Rodgers is a 48 year old male who is who presents with NONADHERENT BEHAVIOR    PARANOID SCHIZOPHRENIA    MISSED AIRLINE FLIGHT AND WANTS AN EXCEUSE    POORLY CONTROLLED DIABETES 2 GOAL 8%     with LEFT SIDED STROKE     NOT TAKING DIABETES MEDICATIONS       Onset : ONGOING     Severity: MODERATE       Home treatments LETTER WRITTEN  ENCOURAGED TO TAKE DIABETES MEDICATIONS      Additional Symptoms: RESIDUAL STROKE SYMPTOMS      Course  ONGOING POOR DECISION    HISTORY OF LATENT TB with TREATMENT     HISTORY OF PEPTIC ULCER DISEASE     CARPAL TUNNEL SYNDROME     BENIGN PROSTATIC HYPERTROPHY     LDL OR \"BAD\" CHOLESTEROL  GOAL < 100     SMOKER     GASTROESOPHAGEAL REFLUX DISEASE WITHOUT ESOPHAGITIS     HYPERTENSION WITH GOAL OF LESS THAN 140/80     CHRONIC BRONCHITIS     CEREBROVASCULAR ACCIDENT WITH LEFT SIDED ARM AND LEG WEAKNESS PARESTHESIA                   Diabetes Follow-up    Patient is checking blood sugars: not at all  Diabetic concerns: blood sugar frequently over 200   Symptoms of hypoglycemia (low blood sugar): none   Paresthesias (numbness or burning in feet) or sores: No   Date of last diabetic eye exam:  YEARLY     Hyperlipidemia Follow-Up    Rate your low fat/cholesterol diet?: good  Taking statin?  No  Other lipid medications/supplements?:  none    Hypertension Follow-up    Outpatient blood pressures are not being checked.  Low Salt Diet: no added salt    BP " Readings from Last 2 Encounters:   06/01/18 124/76   05/22/18 124/74     Hemoglobin A1C (%)   Date Value   04/03/2018 11.6 (H)   01/23/2018 10.1 (H)     LDL Cholesterol Calculated (mg/dL)   Date Value   10/24/2017 172 (H)   09/22/2016 145 (H)     LDL Cholesterol Direct (mg/dL)   Date Value   01/23/2018 164 (H)     Amount of exercise or physical activity: 6-7 days/week for an average of 30-45 minutes  Problems taking medications regularly: No  Medication side effects: none  Diet: low salt, low fat/cholesterol and diabetic                 Topic Date Due     EYE EXAM Q1 YEAR  08/22/2017     TSH W/ FREE T4 REFLEX Q2 YEAR  02/15/2018               .  Current Outpatient Prescriptions   Medication Sig Dispense Refill     acetaminophen (MAPAP ARTHRITIS PAIN) 650 MG CR tablet TAKE 1 TABLET (650 MG) BY MOUTH 3 TIMES DAILY Profile Rx: patient will contact pharmacy when needed 270 tablet 0     atorvastatin (LIPITOR) 20 MG tablet Take 1 tablet (20 mg) by mouth daily 90 tablet 0     blood glucose calibration (ONETOUCH ULTRA CONTROL) solution Use to calibrate blood glucose monitor as directed. 1 Bottle 11     blood glucose monitoring (NO BRAND SPECIFIED) test strip 1 strip by In Vitro route daily 100 each 11     clopidogrel (PLAVIX) 75 MG tablet Take 1 tablet (75 mg) by mouth daily 30 tablet 11     glimepiride (AMARYL) 4 MG tablet Take 1 tablet (4 mg) by mouth two times daily 180 tablet 11     losartan-hydrochlorothiazide (HYZAAR) 100-12.5 MG per tablet Take 1 tablet by mouth daily 30 tablet 11     metoprolol succinate (TOPROL-XL) 25 MG 24 hr tablet TAKE 1 TABLET (25 MG) BY MOUTH DAILY Profile Rx: patient will contact pharmacy when needed 90 tablet 3     order for DME Equipment being ordered:  BILATERAL  LONGITUDINAL ARCH SUPPORTS   DOES NOT QUALIFY FOR SHOES   HE HAS ADVANCED NEUROPATHY BUT NO CALLOSITIES   MONET ALLEN JR., MD 1 each 3     pregabalin (LYRICA) 50 MG capsule Take 1 capsule (50 mg) by mouth 3 times daily 90  capsule 2     risperiDONE (RISPERDAL) 1 MG tablet Take 2 tablets (2 mg) by mouth At Bedtime 90 tablet 3     sitagliptin (JANUVIA) 100 MG tablet TAKE 1 TABLET (100 MG) BY MOUTH DAILY  Profile Rx: patient will contact pharmacy when needed 90 tablet 0     Sodium Fluoride (DENTA 5000 PLUS) 1.1 % CREA USE FOR DAILY BRUSHING  Profile Rx: patient will contact pharmacy when needed 51 g 5     tamsulosin (FLOMAX) 0.4 MG capsule Take 1 capsule (0.4 mg) by mouth daily 90 capsule 11     diclofenac (VOLTAREN) 1 % GEL topical gel APPLY 4 GRAMS TO KNEES OR 2 GRAMS TO HANDS BY TOPICAL ROUTE FOUR TIMES DAILY USING ENCLOSED DOSING CARD Profile Rx: patient will contact pharmacy when needed (Patient not taking: Reported on 6/1/2018) 100 g 0     [DISCONTINUED] atorvastatin (LIPITOR) 20 MG tablet Take 1 tablet (20 mg) by mouth daily 90 tablet 0     [DISCONTINUED] clopidogrel (PLAVIX) 75 MG tablet Take 1 tablet (75 mg) by mouth daily 30 tablet 11     [DISCONTINUED] glimepiride (AMARYL) 4 MG tablet Take 1 tablet (4 mg) by mouth two times daily 180 tablet 11     [DISCONTINUED] glimepiride (AMARYL) 4 MG tablet Take 1 tablet (4 mg) by mouth two times daily 180 tablet 11     [DISCONTINUED] losartan-hydrochlorothiazide (HYZAAR) 100-12.5 MG per tablet Take 1 tablet by mouth daily 30 tablet 11     [DISCONTINUED] metoprolol succinate (TOPROL-XL) 25 MG 24 hr tablet TAKE 1 TABLET (25 MG) BY MOUTH DAILY Profile Rx: patient will contact pharmacy when needed 90 tablet 3     [DISCONTINUED] pregabalin (LYRICA) 50 MG capsule Take 1 capsule (50 mg) by mouth 3 times daily 90 capsule 2     [DISCONTINUED] sitagliptin (JANUVIA) 100 MG tablet TAKE 1 TABLET (100 MG) BY MOUTH DAILY  Profile Rx: patient will contact pharmacy when needed 90 tablet 0              No Known Allergies      Immunization History   Administered Date(s) Administered     Influenza (IIV3) PF 10/04/2010, 09/30/2013     Influenza Vaccine IM 3yrs+ 4 Valent IIV4 10/06/2014     Pneumococcal 23  cris 2013     TD (ADULT, 7+) 2010               reports that he does not drink alcohol.          reports that he does not use illicit drugs.        family history includes Asthma in his father; C.A.D. in his father; DIABETES in his father; Hypertension in his father; Neurologic Disorder in his brother; Unknown/Adopted in his mother. There is no history of Cancer - colorectal.        indicated that the status of his no family hx of is unknown. He indicated that the status of his mother is unknown. He indicated that his father is . He indicated that the status of his brother is unknown.          has a past surgical history that includes ENT surgery.         reports that he does not engage in sexual activity.    .  Pediatric History   Patient Guardian Status     Not on file.     Other Topics Concern     Parent/Sibling W/ Cabg, Mi Or Angioplasty Before 65f 55m? No     unknown     Social History Narrative               reports that he quit smoking about 15 months ago. His smoking use included Cigarettes. He smoked 0.25 packs per day. He has never used smokeless tobacco.        Medical, social, surgical, and family histories reviewed.        Labs reviewed in EPIC  Patient Active Problem List   Diagnosis     Latent tuberculosis infection     PUD (peptic ulcer disease)     Moderate major depression (H)     Unspecified hyperplasia of prostate with urinary obstruction and other lower urinary tract symptoms (LUTS)     Carpal tunnel syndrome     Disturbance of skin sensation     Vitamin D deficiency     Paranoid schizophrenia (H)     Nocturia     Schizophrenia (H)     Hallucinations     Positive PPD     Hyperlipidemia     Tobacco use disorder     Gastroesophageal reflux disease without esophagitis     Health Care Home     Major depression in partial remission (H)     Hyperlipidemia LDL goal <100     Type 2 diabetes mellitus, uncontrolled, with neuropathy (H)     Hypertension goal BP (blood pressure) <  140/90     COPD (chronic obstructive pulmonary disease) (H)     Pain in joint, pelvic region and thigh     Tooth pain     Enlarged prostate with lower urinary tract symptoms (LUTS)     H/O: stroke with residual effects LEFT ARM AND LEG       Past Surgical History:   Procedure Laterality Date     ENT SURGERY      sinuses         Social History   Substance Use Topics     Smoking status: Former Smoker     Packs/day: 0.25     Types: Cigarettes     Quit date: 2/16/2017     Smokeless tobacco: Never Used     Alcohol use No       Family History   Problem Relation Age of Onset     Asthma Father      DIABETES Father      Hypertension Father      C.A.D. Father      Neurologic Disorder Brother      Unknown/Adopted Mother      Cancer - colorectal No family hx of              Current Outpatient Prescriptions   Medication Sig Dispense Refill     acetaminophen (MAPAP ARTHRITIS PAIN) 650 MG CR tablet TAKE 1 TABLET (650 MG) BY MOUTH 3 TIMES DAILY Profile Rx: patient will contact pharmacy when needed 270 tablet 0     atorvastatin (LIPITOR) 20 MG tablet Take 1 tablet (20 mg) by mouth daily 90 tablet 0     blood glucose calibration (ONETOUCH ULTRA CONTROL) solution Use to calibrate blood glucose monitor as directed. 1 Bottle 11     blood glucose monitoring (NO BRAND SPECIFIED) test strip 1 strip by In Vitro route daily 100 each 11     clopidogrel (PLAVIX) 75 MG tablet Take 1 tablet (75 mg) by mouth daily 30 tablet 11     glimepiride (AMARYL) 4 MG tablet Take 1 tablet (4 mg) by mouth two times daily 180 tablet 11     losartan-hydrochlorothiazide (HYZAAR) 100-12.5 MG per tablet Take 1 tablet by mouth daily 30 tablet 11     metoprolol succinate (TOPROL-XL) 25 MG 24 hr tablet TAKE 1 TABLET (25 MG) BY MOUTH DAILY Profile Rx: patient will contact pharmacy when needed 90 tablet 3     order for DME Equipment being ordered:  BILATERAL  LONGITUDINAL ARCH SUPPORTS   DOES NOT QUALIFY FOR SHOES   HE HAS ADVANCED NEUROPATHY BUT NO CALLOSITIES    MONET ALLEN JR., MD 1 each 3     pregabalin (LYRICA) 50 MG capsule Take 1 capsule (50 mg) by mouth 3 times daily 90 capsule 2     risperiDONE (RISPERDAL) 1 MG tablet Take 2 tablets (2 mg) by mouth At Bedtime 90 tablet 3     sitagliptin (JANUVIA) 100 MG tablet TAKE 1 TABLET (100 MG) BY MOUTH DAILY  Profile Rx: patient will contact pharmacy when needed 90 tablet 0     Sodium Fluoride (DENTA 5000 PLUS) 1.1 % CREA USE FOR DAILY BRUSHING  Profile Rx: patient will contact pharmacy when needed 51 g 5     tamsulosin (FLOMAX) 0.4 MG capsule Take 1 capsule (0.4 mg) by mouth daily 90 capsule 11     diclofenac (VOLTAREN) 1 % GEL topical gel APPLY 4 GRAMS TO KNEES OR 2 GRAMS TO HANDS BY TOPICAL ROUTE FOUR TIMES DAILY USING ENCLOSED DOSING CARD Profile Rx: patient will contact pharmacy when needed (Patient not taking: Reported on 6/1/2018) 100 g 0     [DISCONTINUED] atorvastatin (LIPITOR) 20 MG tablet Take 1 tablet (20 mg) by mouth daily 90 tablet 0     [DISCONTINUED] clopidogrel (PLAVIX) 75 MG tablet Take 1 tablet (75 mg) by mouth daily 30 tablet 11     [DISCONTINUED] glimepiride (AMARYL) 4 MG tablet Take 1 tablet (4 mg) by mouth two times daily 180 tablet 11     [DISCONTINUED] glimepiride (AMARYL) 4 MG tablet Take 1 tablet (4 mg) by mouth two times daily 180 tablet 11     [DISCONTINUED] losartan-hydrochlorothiazide (HYZAAR) 100-12.5 MG per tablet Take 1 tablet by mouth daily 30 tablet 11     [DISCONTINUED] metoprolol succinate (TOPROL-XL) 25 MG 24 hr tablet TAKE 1 TABLET (25 MG) BY MOUTH DAILY Profile Rx: patient will contact pharmacy when needed 90 tablet 3     [DISCONTINUED] pregabalin (LYRICA) 50 MG capsule Take 1 capsule (50 mg) by mouth 3 times daily 90 capsule 2     [DISCONTINUED] sitagliptin (JANUVIA) 100 MG tablet TAKE 1 TABLET (100 MG) BY MOUTH DAILY  Profile Rx: patient will contact pharmacy when needed 90 tablet 0           Recent Labs   Lab Test  04/03/18   1623  01/23/18   1418  10/24/17   1637  09/22/16    1637  02/15/16   1655   04/08/13   1232   A1C  11.6*  10.1*  11.4*  9.0*   --    < >  11.3*   LDL   --   164*  172*  145*  168*   < >  153*   HDL   --    --   41  39*  34*   < >  32*   TRIG   --    --   170*  264*  178*   < >  338*   ALT   --    --   50  44  39   < >  44   CR   --   0.60*  0.60*  0.85  0.70   < >  0.80   GFRESTIMATED   --   >90  >90  >90  >90   < >  >90   GFRESTBLACK   --   >90  >90  >90  >90   < >  >90   POTASSIUM   --   3.9  4.2  4.8  4.3   < >  4.2   TSH   --    --    --    --   1.01  1.01   --   1.87    < > = values in this interval not displayed.            BP Readings from Last 6 Encounters:   06/01/18 124/76   05/22/18 124/74   04/03/18 136/74   02/16/18 (!) 140/94   02/12/18 144/70   01/23/18 112/64           Wt Readings from Last 3 Encounters:   06/01/18 165 lb (74.8 kg)   05/22/18 166 lb (75.3 kg)   04/03/18 165 lb (74.8 kg)                 Positive symptoms or findings indicated by bold designation:         ROS: 10 point ROS neg other than the symptoms noted above in the HPI.except  has Latent tuberculosis infection; PUD (peptic ulcer disease); Moderate major depression (H); Unspecified hyperplasia of prostate with urinary obstruction and other lower urinary tract symptoms (LUTS); Carpal tunnel syndrome; Disturbance of skin sensation; Vitamin D deficiency; Paranoid schizophrenia (H); Nocturia; Schizophrenia (H); Hallucinations; Positive PPD; Hyperlipidemia; Tobacco use disorder; Gastroesophageal reflux disease without esophagitis; Health Care Home; Major depression in partial remission (H); Hyperlipidemia LDL goal <100; Type 2 diabetes mellitus, uncontrolled, with neuropathy (H); Hypertension goal BP (blood pressure) < 140/90; COPD (chronic obstructive pulmonary disease) (H); Pain in joint, pelvic region and thigh; Tooth pain; Enlarged prostate with lower urinary tract symptoms (LUTS); and H/O: stroke with residual effects LEFT ARM AND LEG on his problem list.  Review Of Systems    Skin:  negative    Eyes: negative    Ears/Nose/Throat: negative    Respiratory: No shortness of breath, dyspnea on exertion, cough, or hemoptysis    SMOKER with CHRONIC BRONCHITIS     Cardiovascular: negative    Gastrointestinal:  GASTROESOPHAGEAL REFLUX DISEASE WITHOUT ESOPHAGITIS     Genitourinary: negative    Musculoskeletal: LEFT SIDED WEAKNESS    Neurologic: negative, local weakness, numbness or tingling of hands and numbness or tingling of feet    Psychiatric: PARANOID SCHIZOPHRENIA      Hematologic/Lymphatic/Immunologic: negative    Endocrine: diabetes                PE:  /76 (Cuff Size: Adult Regular)  Pulse 76  Temp 96.4  F (35.8  C) (Tympanic)  Resp 16  Wt 165 lb (74.8 kg)  SpO2 100%  BMI 24.02 kg/m2 Body mass index is 24.02 kg/(m^2).        Constitutional: general appearance, well nourished, well developed, in no acute distress, well developed, appears stated age, normal body habitus,          Eyes:; The patient has normal eyelids sclerae and conjunctivae :          Ears/Nose/Throat: external ear, overall: normal appearance; external nose, overall: benign appearance, normal moujth gums and lips           Neck: thyroid, overall: normal size, normal consistency, nontender,          Respiratory:  palpation of chest, overall: normal excursion,    Clear to percussion and auscultation     NO Tachypnea    NORMAL  Color          Cardiovascular:  Good color with no peripheral edema    Regular sinus rhythm without murmur.  Physiologic heart sounds   Heart is unelarged    .     Chest/Breast: normal shape           Abdominal exam,  Liver and spleen are  unenlarged        Tenderness    Scars              Urogenital; no renal, flank or bladder  tenderness;          Lymphatic: neck nodes,     Other nodes         Musculoskeletal:  Brief ortho exam normal except:   LEFT SIDED ARM AND LEG WEAKNESS         Integument: inspection of skin, no rash, lesions; and, palpation, no induration, no tenderness.           Neurologic mental status, overall: alert and oriented; gait, no ataxia, no unsteadiness; coordination, no tremors; cranial nerves, overall: normal motor, overall: normal bulk, tone.          Psychiatric: orientation/consciousness, overall: oriented to person, place and time; behavior/psychomotor activity, no tics, normal psychomotor activity; mood and affect, overall: normal mood and affect; appearance, overall: well-groomed, good eye contact; speech, overall: normal quality, no aphasia and normal quality, quantity, intact.        Diagnostic Test Results:  Results for orders placed or performed in visit on 04/03/18   Hemoglobin A1c   Result Value Ref Range    Hemoglobin A1C 11.6 (H) 0 - 6.4 %           ICD-10-CM    1. Type 2 diabetes mellitus, uncontrolled, with neuropathy (H) E11.40 sitagliptin (JANUVIA) 100 MG tablet    E11.65    2. Paranoid schizophrenia (H) F20.0 risperiDONE (RISPERDAL) 1 MG tablet   3. Chronic radicular low back pain M54.16 acetaminophen (MAPAP ARTHRITIS PAIN) 650 MG CR tablet    G89.29    4. Type 2 diabetes mellitus with diabetic polyneuropathy, without long-term current use of insulin (H) E11.42 glimepiride (AMARYL) 4 MG tablet     DISCONTINUED: glimepiride (AMARYL) 4 MG tablet   5. Hyperlipidemia LDL goal <100 E78.5 atorvastatin (LIPITOR) 20 MG tablet   6. H/O: stroke with residual effects LEFT ARM AND LEG I69.30 clopidogrel (PLAVIX) 75 MG tablet   7. Essential hypertension with goal blood pressure less than 140/90 I10 losartan-hydrochlorothiazide (HYZAAR) 100-12.5 MG per tablet   8. Hypertension goal BP (blood pressure) < 140/90 I10 metoprolol succinate (TOPROL-XL) 25 MG 24 hr tablet   9. Benign non-nodular prostatic hyperplasia with lower urinary tract symptoms N40.1 tamsulosin (FLOMAX) 0.4 MG capsule   10. Cerebral infarction due to embolism of left middle cerebral artery (H) I63.412 pregabalin (LYRICA) 50 MG capsule    left sided arm and leg pain referactory to gabapentine]               .    Side effects benefits and risks thoroughly discussed. .he may come in early if unimproved or getting worse          Please drink 2 glasses of water prior to meals and walk 15-30 minutes after meals        I spent  25 MINUTES SPENT  with patient discussing the following issues   The primary encounter diagnosis was Type 2 diabetes mellitus, uncontrolled, with neuropathy (H). Diagnoses of Paranoid schizophrenia (H), Chronic radicular low back pain, Type 2 diabetes mellitus with diabetic polyneuropathy, without long-term current use of insulin (H), Hyperlipidemia LDL goal <100, H/O: stroke with residual effects LEFT ARM AND LEG, Essential hypertension with goal blood pressure less than 140/90, Hypertension goal BP (blood pressure) < 140/90, Benign non-nodular prostatic hyperplasia with lower urinary tract symptoms, and Cerebral infarction due to embolism of left middle cerebral artery (H) were also pertinent to this visit. over half of which involved counseling and coordination of care.      Patient Instructions     A1C 5.0 AVERAGE GLUCOSE   90    A1C 6.0 AVERAGE GLUCOSE 120    A1C 6.5 AVERAGE GLUCOSE 135    A1C 6.8 AVERAGE GLUCOSE 144    A1C 7.0 AVERAGE GLUCOSE 150    A1C 8.0 AVERAGE GLUCOSE 180    A1C 9.0 AVERAGE GLUCOSE 200    A1C 10. AVERAGE GLUCOSE 230    A1C 11. AVERAGE GLUCOSE 269    A1C 12 AVERAGE GLUCOSE  298    A1C 13 AVERAGE GLUCOSE  326    A1C 14 AVERAGE GLUCOSE  355  (E11.40,  E11.65) Type 2 diabetes mellitus, uncontrolled, with neuropathy (H)  (primary encounter diagnosis)  Comment:    Plan: sitagliptin (JANUVIA) 100 MG tablet             (F20.0) Paranoid schizophrenia (H)  Comment:    Plan: risperiDONE (RISPERDAL) 1 MG tablet             (M54.16,  G89.29) Chronic radicular low back pain  Comment:    Plan: acetaminophen (MAPAP ARTHRITIS PAIN) 650 MG CR         tablet             (E11.42) Type 2 diabetes mellitus with diabetic polyneuropathy, without long-term current use of insulin  (H)  Comment:    Plan: glimepiride (AMARYL) 4 MG tablet, DISCONTINUED:        glimepiride (AMARYL) 4 MG tablet             (E78.5) Hyperlipidemia LDL goal <100  Comment:         Plan: atorvastatin (LIPITOR) 20 MG tablet             (I69.30) H/O: stroke with residual effects LEFT ARM AND LEG  Comment:    Plan: clopidogrel (PLAVIX) 75 MG tablet             (I10) Essential hypertension with goal blood pressure less than 140/90  Comment:    Plan: losartan-hydrochlorothiazide (HYZAAR) 100-12.5         MG per tablet             (I10) Hypertension goal BP (blood pressure) < 140/90  Comment:    Plan: metoprolol succinate (TOPROL-XL) 25 MG 24 hr         tablet             (N40.1) Benign non-nodular prostatic hyperplasia with lower urinary tract symptoms  Comment:    Plan: tamsulosin (FLOMAX) 0.4 MG capsule             (I63.412) Cerebral infarction due to embolism of left middle cerebral artery (H)  Comment: left sided arm and leg pain referactory to gabapentine]  Plan: pregabalin (LYRICA) 50 MG capsule                                     ALL THE ABOVE PROBLEMS ARE STABLE AND MED CHANGES AS NOTED        Diet: MEDITERRANEAN DIET             Exercise: 90 MINUTES OF EXERCISE PER DAY RECOMMENDED    Exercises Range of motion, balance, isometric, and strengthening exercises 30 repetitions twice daily of involved joints            .MONET ALLEN MD 6/1/2018 1:05 PM  June 1, 2018

## 2018-06-01 NOTE — LETTER
[facility]  [address]  [address]  [place] [address]  [phone]                                                                                                           Vikram Glez [place]oda  [address]  [facility] [address]    June 1, 2018      Dear Vikram,      HIS CHILDREN ARE IN [place]    BECAUSE OF PHYSICAL AND MENTAL PROBLEMS  HE NEEDS AN ESCORT TO THE AIRPORT AND TO THE AIRPLANE    HE SUFFERS FROM SIGNIFICANT STROKE WITH RESIDUAL WEAKNESS     HE SUFFERS FROM SIGNIFICANT MENTAL ISSUES INCLUDING A THOUGHT DISORDER    HE HAS SIGNIFICANT POORLY CONTROLLED DIABETES     MISSED HIS FLIGHT ON MAY 7TH 2018 FOR THE ABOVE REASONS     Thank you for choosing [facility].  We appreciate the opportunity to serve you and look forward to supporting your healthcare needs in the future.    If you have any questions or concerns, please call me or my staff at [phone].      Sincerely,    Hamlet Tavares Jr MD

## 2018-06-01 NOTE — PATIENT INSTRUCTIONS
A1C 5.0 AVERAGE GLUCOSE   90    A1C 6.0 AVERAGE GLUCOSE 120    A1C 6.5 AVERAGE GLUCOSE 135    A1C 6.8 AVERAGE GLUCOSE 144    A1C 7.0 AVERAGE GLUCOSE 150    A1C 8.0 AVERAGE GLUCOSE 180    A1C 9.0 AVERAGE GLUCOSE 200    A1C 10. AVERAGE GLUCOSE 230    A1C 11. AVERAGE GLUCOSE 269    A1C 12 AVERAGE GLUCOSE  298    A1C 13 AVERAGE GLUCOSE  326    A1C 14 AVERAGE GLUCOSE  355  (E11.40,  E11.65) Type 2 diabetes mellitus, uncontrolled, with neuropathy (H)  (primary encounter diagnosis)  Comment:    Plan: sitagliptin (JANUVIA) 100 MG tablet             (F20.0) Paranoid schizophrenia (H)  Comment:    Plan: risperiDONE (RISPERDAL) 1 MG tablet             (M54.16,  G89.29) Chronic radicular low back pain  Comment:    Plan: acetaminophen (MAPAP ARTHRITIS PAIN) 650 MG CR         tablet             (E11.42) Type 2 diabetes mellitus with diabetic polyneuropathy, without long-term current use of insulin (H)  Comment:    Plan: glimepiride (AMARYL) 4 MG tablet, DISCONTINUED:        glimepiride (AMARYL) 4 MG tablet             (E78.5) Hyperlipidemia LDL goal <100  Comment:         Plan: atorvastatin (LIPITOR) 20 MG tablet             (I69.30) H/O: stroke with residual effects LEFT ARM AND LEG  Comment:    Plan: clopidogrel (PLAVIX) 75 MG tablet             (I10) Essential hypertension with goal blood pressure less than 140/90  Comment:    Plan: losartan-hydrochlorothiazide (HYZAAR) 100-12.5         MG per tablet             (I10) Hypertension goal BP (blood pressure) < 140/90  Comment:    Plan: metoprolol succinate (TOPROL-XL) 25 MG 24 hr         tablet             (N40.1) Benign non-nodular prostatic hyperplasia with lower urinary tract symptoms  Comment:    Plan: tamsulosin (FLOMAX) 0.4 MG capsule             (I63.412) Cerebral infarction due to embolism of left middle cerebral artery (H)  Comment: left sided arm and leg pain referactory to gabapentine]  Plan: pregabalin (LYRICA) 50 MG capsule

## 2018-06-07 ENCOUNTER — TELEPHONE (OUTPATIENT)
Dept: FAMILY MEDICINE | Facility: CLINIC | Age: 48
End: 2018-06-07

## 2018-06-07 NOTE — TELEPHONE ENCOUNTER
Prior Authorization Retail Medication Request    Medication/Dose: pregabalin (LYRICA) 50 MG capsule  ICD code (if different than what is on RX):     Previously Tried and Failed:     Rationale:       Insurance Name:  [place] medicaid  Insurance ID:  [id number]      Pharmacy Information (if different than what is on RX)  [facility]  Phone:  [phone]

## 2018-06-07 NOTE — TELEPHONE ENCOUNTER
Prior Authorization Approval    Authorization Effective Date: 6/1/2018  Authorization Expiration Date: 5/26/2019  Medication: LYRICA 50MG - DENIED  Approved Dose/Quantity: DAILY  Reference #: [id number]   Insurance Company: [place] Medicaid ([facility]) - Phone [phone] Fax [phone]  Expected CoPay: NA     CoPay Card Available:      Foundation Assistance Needed:    Which Pharmacy is filling the prescription (Not needed for infusion/clinic administered[id number] [place], [place] [facility] [address] [place] [address]  Pharmacy Notified: Yes  Patient Notified: No

## 2018-06-07 NOTE — TELEPHONE ENCOUNTER
PA Initiation    Medication: LYRICA 50MG  Insurance Company: [place] Medicaid ([facility]) - Phone [phone] Fax [phone]  [id number] [place], [place] - [address]  Filling Pharmacy Phone: [phone]  Filling Pharmacy Fax:    Start Date: 6/7/2018

## 2018-06-18 ENCOUNTER — OFFICE VISIT (OUTPATIENT)
Dept: FAMILY MEDICINE | Facility: CLINIC | Age: 48
End: 2018-06-18
Payer: MEDICAID

## 2018-06-18 VITALS
OXYGEN SATURATION: 98 % | BODY MASS INDEX: 23.94 KG/M2 | DIASTOLIC BLOOD PRESSURE: 86 MMHG | RESPIRATION RATE: 16 BRPM | SYSTOLIC BLOOD PRESSURE: 132 MMHG | TEMPERATURE: 98.9 F | HEART RATE: 92 BPM | WEIGHT: 164.5 LBS

## 2018-06-18 DIAGNOSIS — I69.30 H/O: STROKE WITH RESIDUAL EFFECTS: Primary | Chronic | ICD-10-CM

## 2018-06-18 DIAGNOSIS — K21.9 GASTROESOPHAGEAL REFLUX DISEASE WITHOUT ESOPHAGITIS: Chronic | ICD-10-CM

## 2018-06-18 LAB — HBA1C MFR BLD: 11.5 % (ref 0–5.6)

## 2018-06-18 PROCEDURE — 82043 UR ALBUMIN QUANTITATIVE: CPT | Performed by: FAMILY MEDICINE

## 2018-06-18 PROCEDURE — 84443 ASSAY THYROID STIM HORMONE: CPT | Performed by: FAMILY MEDICINE

## 2018-06-18 PROCEDURE — 83036 HEMOGLOBIN GLYCOSYLATED A1C: CPT | Performed by: FAMILY MEDICINE

## 2018-06-18 PROCEDURE — 36415 COLL VENOUS BLD VENIPUNCTURE: CPT | Performed by: FAMILY MEDICINE

## 2018-06-18 PROCEDURE — T1013 SIGN LANG/ORAL INTERPRETER: HCPCS | Mod: U3 | Performed by: FAMILY MEDICINE

## 2018-06-18 PROCEDURE — 80048 BASIC METABOLIC PNL TOTAL CA: CPT | Performed by: FAMILY MEDICINE

## 2018-06-18 PROCEDURE — 99214 OFFICE O/P EST MOD 30 MIN: CPT | Performed by: FAMILY MEDICINE

## 2018-06-18 RX ORDER — GABAPENTIN 300 MG/1
CAPSULE ORAL
COMMUNITY
Start: 2018-05-06 | End: 2018-06-18

## 2018-06-18 RX ORDER — PANTOPRAZOLE SODIUM 40 MG/1
TABLET, DELAYED RELEASE ORAL
COMMUNITY
Start: 2018-02-12 | End: 2018-06-18 | Stop reason: ALTCHOICE

## 2018-06-18 RX ORDER — CETIRIZINE HYDROCHLORIDE 10 MG/1
TABLET ORAL
COMMUNITY
Start: 2018-06-06 | End: 2019-10-01

## 2018-06-18 RX ORDER — OMEPRAZOLE 40 MG/1
CAPSULE, DELAYED RELEASE ORAL
COMMUNITY
Start: 2018-05-06 | End: 2018-06-18

## 2018-06-18 RX ORDER — PANTOPRAZOLE SODIUM 40 MG/1
TABLET, DELAYED RELEASE ORAL
Qty: 30 TABLET | Status: CANCELLED | OUTPATIENT
Start: 2018-06-18

## 2018-06-18 RX ORDER — OMEPRAZOLE 40 MG/1
40 CAPSULE, DELAYED RELEASE ORAL DAILY
Qty: 30 CAPSULE | Refills: 1 | Status: SHIPPED | OUTPATIENT
Start: 2018-06-18 | End: 2019-02-01

## 2018-06-18 RX ORDER — GABAPENTIN 300 MG/1
300 CAPSULE ORAL 3 TIMES DAILY
Qty: 90 CAPSULE | Refills: 1 | Status: SHIPPED | OUTPATIENT
Start: 2018-06-18 | End: 2019-09-25

## 2018-06-18 NOTE — LETTER
June 20, 2018      Vikram Rodgers  [address]  [facility] [address]        Dear ,    We are writing to inform you of your test results.    Hemoglobin A1c not in control, known diabetes. Essentially no change from last test, still very high  Metabolic panel, known diabetes.  Poor control, glucose at 350  Albumin level in urine normal  Thyroid test (TSH) is normal      Resulted Orders   TSH WITH FREE T4 REFLEX   Result Value Ref Range    TSH 0.75 0.40 - 4.00 mU/L   Hemoglobin A1c   Result Value Ref Range    Hemoglobin A1C 11.5 (H) 0 - 5.6 %      Comment:      Normal <5.7% Prediabetes 5.7-6.4%  Diabetes 6.5% or higher - adopted from ADA   consensus guidelines.     Basic metabolic panel   Result Value Ref Range    Sodium 135 133 - 144 mmol/L    Potassium 4.3 3.4 - 5.3 mmol/L    Chloride 101 94 - 109 mmol/L    Carbon Dioxide 20 20 - 32 mmol/L    Anion Gap 14 3 - 14 mmol/L    Glucose 350 (H) 70 - 99 mg/dL    Urea Nitrogen 11 7 - 30 mg/dL    Creatinine 0.76 0.66 - 1.25 mg/dL    GFR Estimate >90 >60 mL/min/1.7m2      Comment:      Non  GFR Calc    GFR Estimate If Black >90 >60 mL/min/1.7m2      Comment:       GFR Calc    Calcium 9.4 8.5 - 10.1 mg/dL   Albumin Random Urine Quantitative with Creat Ratio   Result Value Ref Range    Creatinine Urine 47 mg/dL    Albumin Urine mg/L <5 mg/L    Albumin Urine mg/g Cr Unable to calculate due to low value 0 - 17 mg/g Cr       If you have any questions or concerns, please call the clinic at the number listed above.       Sincerely,        Regis Owens MD

## 2018-06-18 NOTE — MR AVS SNAPSHOT
"              After Visit Summary   6/18/2018    Vikram Rodgers    MRN: [id number]           Patient Information     Date Of Birth          1970        Visit Information        Provider Department      6/18/2018 1:45 PM Regis Owens MD; [employer] [facility]        Today's Diagnoses     Gastroesophageal reflux disease without esophagitis    -  1    Type 2 diabetes mellitus, uncontrolled, with neuropathy (H)        H/O: stroke with residual effects LEFT ARM AND LEG           Follow-ups after your visit        Who to contact     If you have questions or need follow up information about today's clinic visit or your schedule please contact [facility] directly at [phone].  Normal or non-critical lab and imaging results will be communicated to you by MyChart, letter or phone within 4 business days after the clinic has received the results. If you do not hear from us within 7 days, please contact the clinic through MyChart or phone. If you have a critical or abnormal lab result, we will notify you by phone as soon as possible.  Submit refill requests through [employer] or call your pharmacy and they will forward the refill request to us. Please allow 3 business days for your refill to be completed.          Additional Information About Your Visit        MyChart Information     [employer] lets you send messages to your doctor, view your test results, renew your prescriptions, schedule appointments and more. To sign up, go to [email or web address] . Click on \"Log in\" on the left side of the screen, which will take you to the Welcome page. Then click on \"Sign up Now\" on the right side of the page.     You will be asked to enter the access code listed below, as well as some personal information. Please follow the directions to create your username and password.     Your access code is: [email or web address]  Expires: 8/20/2018  9:21 " AM     Your access code will  in 90 days. If you need help or a new code, please call your [place] clinic or [phone].        Care EveryWhere ID     This is your Care EveryWhere ID. This could be used by other organizations to access your [place] medical records  [id number]        Your Vitals Were     Pulse Temperature Respirations Pulse Oximetry BMI (Body Mass Index)       92 98.9  F (37.2  C) (Tympanic) 16 98% 23.94 kg/m2        Blood Pressure from Last 3 Encounters:   18 132/86   18 124/76   18 124/74    Weight from Last 3 Encounters:   18 164 lb 8 oz (74.6 kg)   18 165 lb (74.8 kg)   18 166 lb (75.3 kg)              We Performed the Following     Albumin Random Urine Quantitative with Creat Ratio     Basic metabolic panel     Hemoglobin A1c     TSH WITH FREE T4 REFLEX          Today's Medication Changes          These changes are accurate as of 18  2:09 PM.  If you have any questions, ask your nurse or doctor.               These medicines have changed or have updated prescriptions.        Dose/Directions    gabapentin 300 MG capsule   Commonly known as:  NEURONTIN   This may have changed:    - how much to take  - how to take this  - when to take this   Used for:  H/O: stroke with residual effects   Changed by:  Regis Owens MD        Dose:  300 mg   Take 1 capsule (300 mg) by mouth 3 times daily   Quantity:  90 capsule   Refills:  1       omeprazole 40 MG capsule   Commonly known as:  priLOSEC   This may have changed:    - how much to take  - how to take this  - when to take this   Used for:  Gastroesophageal reflux disease without esophagitis   Changed by:  Regis Owens MD        Dose:  40 mg   Take 1 capsule (40 mg) by mouth daily   Quantity:  30 capsule   Refills:  1       sitagliptin 100 MG tablet   Commonly known as:  JANUVIA   This may have changed:  additional instructions   Used for:  Type 2 diabetes mellitus, uncontrolled, with neuropathy  (H)   Changed by:  Regis Owens MD        TAKE 1 TABLET (100 MG) BY MOUTH DAILY   Quantity:  90 tablet   Refills:  0         Stop taking these medicines if you haven't already. Please contact your care team if you have questions.     pantoprazole 40 MG EC tablet   Commonly known as:  PROTONIX   Stopped by:  Regis Owens MD           pregabalin 50 MG capsule   Commonly known as:  LYRICA   Stopped by:  Regis Owens MD                Where to get your medicines      These medications were sent to [place] [address] [place] [facility] [address] [facility]  [address] [facility] [address]     Phone:  [phone]     aspirin 81 MG tablet    gabapentin 300 MG capsule    omeprazole 40 MG capsule    sitagliptin 100 MG tablet                Primary Care Provider Office Phone # Fax #    [place] Aury Tavares [phone] [address] [facility] [address]        Equal Access to Services     [facility] Center[facility]RA[facility]AN : Hadii michael oneal hadasho Soomaali, waaxda luqadaha, qaybta kaalmada adeegyada, javier moore . So [facility] [phone].    ATENCIÓN: Si habla español, tiene a mathur disposición servicios gratuitos de asistencia lingüística. Llame al [phone].    We comply with applicable federal civil rights laws and [place] laws. We do not discriminate on the basis of race, color, national origin, age, disability, sex, sexual orientation, or gender identity.            Thank you!     Thank you for choosing [facility]  for your care. Our goal is always to provide you with excellent care. Hearing back from our patients is one way we can continue to improve our services. Please take a few minutes to complete the written survey that you may receive in the mail after your visit with us. Thank you!             Your Updated Medication List - Protect others around you: Learn how to safely use, store and throw away your medicines at  www.disposemymeds.org.          This list is accurate as of 6/18/18  2:09 PM.  Always use your most recent med list.                   Brand Name Dispense Instructions for use Diagnosis    acetaminophen 650 MG CR tablet    MAPAP ARTHRITIS PAIN    270 tablet    TAKE 1 TABLET (650 MG) BY MOUTH 3 TIMES DAILY Profile Rx: patient will contact pharmacy when needed    Chronic radicular low back pain       aspirin 81 MG tablet     30 tablet    Take 1 tablet (81 mg) by mouth daily    H/O: stroke with residual effects       atorvastatin 20 MG tablet    LIPITOR    90 tablet    Take 1 tablet (20 mg) by mouth daily    Hyperlipidemia LDL goal <100       blood glucose calibration solution     1 Bottle    Use to calibrate blood glucose monitor as directed.    Type 2 diabetes mellitus with diabetic polyneuropathy, without long-term current use of insulin (H)       blood glucose monitoring test strip    no brand specified    100 each    1 strip by In Vitro route daily    Cough       cetirizine 10 MG tablet    zyrTEC          clopidogrel 75 MG tablet    PLAVIX    30 tablet    Take 1 tablet (75 mg) by mouth daily    H/O: stroke with residual effects       diclofenac 1 % Gel topical gel    VOLTAREN    100 g    APPLY 4 GRAMS TO KNEES OR 2 GRAMS TO HANDS BY TOPICAL ROUTE FOUR TIMES DAILY USING ENCLOSED DOSING CARD Profile Rx: patient will contact pharmacy when needed    Pain in joint, pelvic region and thigh, unspecified laterality       FLUoxetine 20 MG capsule    PROzac          gabapentin 300 MG capsule    NEURONTIN    90 capsule    Take 1 capsule (300 mg) by mouth 3 times daily    H/O: stroke with residual effects       glimepiride 4 MG tablet    AMARYL    180 tablet    Take 1 tablet (4 mg) by mouth two times daily    Type 2 diabetes mellitus with diabetic polyneuropathy, without long-term current use of insulin (H)       losartan-hydrochlorothiazide 100-12.5 MG per tablet    HYZAAR    30 tablet    Take 1 tablet by mouth daily     Essential hypertension with goal blood pressure less than 140/90       metoprolol succinate 25 MG 24 hr tablet    TOPROL-XL    90 tablet    TAKE 1 TABLET (25 MG) BY MOUTH DAILY Profile Rx: patient will contact pharmacy when needed    Hypertension goal BP (blood pressure) < 140/90       omeprazole 40 MG capsule    priLOSEC    30 capsule    Take 1 capsule (40 mg) by mouth daily    Gastroesophageal reflux disease without esophagitis       order for DME     1 each    Equipment being ordered:  BILATERAL  LONGITUDINAL ARCH SUPPORTS  DOES NOT QUALIFY FOR SHOES  HE HAS ADVANCED NEUROPATHY BUT NO CALLOSITIES  MONET ALLEN JR., MD    Type 2 diabetes mellitus, uncontrolled, with neuropathy (H)       risperiDONE 1 MG tablet    risperDAL    90 tablet    Take 2 tablets (2 mg) by mouth At Bedtime    Paranoid schizophrenia (H)       sitagliptin 100 MG tablet    JANUVIA    90 tablet    TAKE 1 TABLET (100 MG) BY MOUTH DAILY    Type 2 diabetes mellitus, uncontrolled, with neuropathy (H)       Sodium Fluoride 1.1 % Crea    DENTA 5000 PLUS    51 g    USE FOR DAILY BRUSHING Profile Rx: patient will contact pharmacy when needed    Tooth pain       tamsulosin 0.4 MG capsule    FLOMAX    90 capsule    Take 1 capsule (0.4 mg) by mouth daily    Benign non-nodular prostatic hyperplasia with lower urinary tract symptoms

## 2018-06-18 NOTE — PROGRESS NOTES
SUBJECTIVE:   Vikram Rodgers is a 48 year old male who presents to clinic today for the following health issues:      Pt is here with [facility] .    Additional concerns: Lyrica is not covered by insurance. Patient requesting PA be done. Patient is leaving for [place] on 6/21. Will be gone for three months.    Pt does not remember if he had taken Gabapentin before or not.  He had wanted Lyrica because he heard it was stronger      Cerebrovascular Follow-up      Patient history: ischemic cerebrovascular incident    Residual symptoms: Difficult walking, Difficulty speaking and Slurred speech    Worsened or new symptoms since last visit:  YES- LT leg feels heavy    Daily aspirin use: Yes    Hypertension controlled: Yes      Amount of exercise or physical activity: None    Problems taking medications regularly: Yes,  cost of medication    Medication side effects: none  Diet: diabetic, low salt      Diabetes Follow-up      Patient is checking blood sugars: not at all    Diabetic concerns: None     Symptoms of hypoglycemia (low blood sugar): none     Paresthesias (numbness or burning in feet) or sores: No     Date of last diabetic eye exam: 2017    BP Readings from Last 2 Encounters:   06/18/18 132/86   06/01/18 124/76     Hemoglobin A1C (%)   Date Value   04/03/2018 11.6 (H)   01/23/2018 10.1 (H)     LDL Cholesterol Calculated (mg/dL)   Date Value   10/24/2017 172 (H)   09/22/2016 145 (H)     LDL Cholesterol Direct (mg/dL)   Date Value   01/23/2018 164 (H)     Hypertension Follow-up      Outpatient blood pressures are not being checked.    Low Salt Diet: no added salt      Problem list and histories reviewed & adjusted, as indicated.  Additional history: as documented    Recent Labs   Lab Test  04/03/18   1623  01/23/18   1418  10/24/17   1637  09/22/16   1637  02/15/16   1655   04/08/13   1232   A1C  11.6*  10.1*  11.4*  9.0*   --    < >  11.3*   LDL   --   164*  172*  145*  168*   < >  153*   HDL   --     --   41  39*  34*   < >  32*   TRIG   --    --   170*  264*  178*   < >  338*   ALT   --    --   50  44  39   < >  44   CR   --   0.60*  0.60*  0.85  0.70   < >  0.80   GFRESTIMATED   --   >90  >90  >90  >90   < >  >90   GFRESTBLACK   --   >90  >90  >90  >90   < >  >90   POTASSIUM   --   3.9  4.2  4.8  4.3   < >  4.2   TSH   --    --    --    --   1.01  1.01   --   1.87    < > = values in this interval not displayed.      Labs reviewed in EPIC    Reviewed and updated as needed this visit by clinical staff  Tobacco  Allergies  Meds  Problems  Med Hx  Surg Hx  Fam Hx  Soc Hx        Reviewed and updated as needed this visit by Provider  Allergies  Meds  Problems         ROS:  CONSTITUTIONAL:NEGATIVE for fever, chills, change in weight  RESP:NEGATIVE for significant cough or SOB  CV: NEGATIVE for chest pain, palpitations or peripheral edema  NEURO: POSITIVE for HX CVA and weakness Lt arm and leg  ENDOCRINE: NEGATIVE for temperature intolerance, skin/hair changes and POSITIVE  for HX diabetes    OBJECTIVE:                                                    /86 (BP Location: Right arm, Patient Position: Sitting, Cuff Size: Adult Regular)  Pulse 92  Temp 98.9  F (37.2  C) (Tympanic)  Resp 16  Wt 164 lb 8 oz (74.6 kg)  SpO2 98%  BMI 23.94 kg/m2  Body mass index is 23.94 kg/(m^2).  GENERAL APPEARANCE: healthy, alert and no distress  RESP: lungs clear to auscultation - no rales, rhonchi or wheezes  CV: regular rates and rhythm, normal S1 S2, no S3 or S4 and no murmur, click or rub  MS: extremities normal- no gross deformities noted  NEURO: mentation intact, speech normal and weakness of Lt arm and Lt leg    Diagnostic test results:  Lab: see below, results pending     ASSESSMENT/PLAN:                                                        ICD-10-CM    1. H/O: stroke with residual effects LEFT ARM AND LEG I69.30 aspirin 81 MG tablet     gabapentin (NEURONTIN) 300 MG capsule   2. Type 2 diabetes mellitus,  uncontrolled, with neuropathy (H) E11.40 TSH WITH FREE T4 REFLEX    E11.65 Hemoglobin A1c     Basic metabolic panel     Albumin Random Urine Quantitative with Creat Ratio     sitagliptin (JANUVIA) 100 MG tablet   3. Gastroesophageal reflux disease without esophagitis K21.9 omeprazole (PRILOSEC) 40 MG capsule       Follow up with Provider - 3 mo   Need to follow up with Dr Tavares when Pt returns from [place]   Patient Instructions   Need to watch diet, limit sweets and carbohydrates      Regis Owens MD  [facility]

## 2018-06-19 LAB
ANION GAP SERPL CALCULATED.3IONS-SCNC: 14 MMOL/L (ref 3–14)
BUN SERPL-MCNC: 11 MG/DL (ref 7–30)
CALCIUM SERPL-MCNC: 9.4 MG/DL (ref 8.5–10.1)
CHLORIDE SERPL-SCNC: 101 MMOL/L (ref 94–109)
CO2 SERPL-SCNC: 20 MMOL/L (ref 20–32)
CREAT SERPL-MCNC: 0.76 MG/DL (ref 0.66–1.25)
CREAT UR-MCNC: 47 MG/DL
GFR SERPL CREATININE-BSD FRML MDRD: >90 ML/MIN/1.7M2
GLUCOSE SERPL-MCNC: 350 MG/DL (ref 70–99)
MICROALBUMIN UR-MCNC: <5 MG/L
MICROALBUMIN/CREAT UR: NORMAL MG/G CR (ref 0–17)
POTASSIUM SERPL-SCNC: 4.3 MMOL/L (ref 3.4–5.3)
SODIUM SERPL-SCNC: 135 MMOL/L (ref 133–144)
TSH SERPL DL<=0.005 MIU/L-ACNC: 0.75 MU/L (ref 0.4–4)

## 2018-06-29 ENCOUNTER — TELEPHONE (OUTPATIENT)
Dept: NEUROLOGY | Facility: CLINIC | Age: 48
End: 2018-06-29

## 2018-06-29 NOTE — TELEPHONE ENCOUNTER
Received Zio results Report suggests only one day of recording. Three episodes of sinus slowing as low as 26bpm. My nurse (Judith) was able to contact patient after much effort and through an  able to arrange appointment with me for today to discuss face-to-face with  present. Received call that he will not be able to make appointment for today. Have asked scheduling to contact patient and arrange another follow up visit.

## 2019-01-15 DIAGNOSIS — L29.9 PRURITIC DISORDER: ICD-10-CM

## 2019-01-17 RX ORDER — CETIRIZINE HYDROCHLORIDE 10 MG/1
10 TABLET ORAL DAILY
Qty: 30 TABLET | Refills: 1 | Status: SHIPPED | OUTPATIENT
Start: 2019-01-17 | End: 2019-10-01

## 2019-02-01 DIAGNOSIS — I69.30 H/O: STROKE WITH RESIDUAL EFFECTS: Primary | Chronic | ICD-10-CM

## 2019-02-01 DIAGNOSIS — K21.9 GASTROESOPHAGEAL REFLUX DISEASE WITHOUT ESOPHAGITIS: Chronic | ICD-10-CM

## 2019-02-02 NOTE — TELEPHONE ENCOUNTER
"OMEPRAZOLE DR 40 MG CAPSULE  Last Written Prescription Date:  06/18/18  Last Fill Quantity: 30,  # refills: 1   Last office visit: 6/18/2018 with prescribing provider:  06/18/18   Future Office Visit:    Requested Prescriptions   Pending Prescriptions Disp Refills     omeprazole (PRILOSEC) 40 MG DR capsule [Pharmacy Med Name: OMEPRAZOLE DR 40 MG CAPSULE] 30 capsule 1     Sig: TAKE 1 CAPSULE BY MOUTH EVERY DAY    PPI Protocol Failed - 2/1/2019 10:42 PM       Failed - Not on Clopidogrel (unless Pantoprazole ordered)       Passed - No diagnosis of osteoporosis on record       Passed - Recent (12 mo) or future (30 days) visit within the authorizing provider's specialty    Patient had office visit in the last 12 months or has a visit in the next 30 days with authorizing provider or within the authorizing provider's specialty.  See \"Patient Info\" tab in inbasket, or \"Choose Columns\" in Meds & Orders section of the refill encounter.             Passed - Medication is active on med list       Passed - Patient is age 18 or older          "

## 2019-02-02 NOTE — TELEPHONE ENCOUNTER
Requested Prescriptions   Pending Prescriptions Disp Refills     LYRICA 50 MG capsule [Pharmacy Med Name: LYRICA 50 MG CAPSULE]  Last Written Prescription Date:  06/01/2018  Last Fill Quantity: 90,   # refills: 2  Last Office Visit: 06/18/2018  Future Office visit:       Routing refill request to provider for review/approval because:  Drug not on the [facility] or [facility] refill protocol or controlled substance     90 capsule 2     Sig: TAKE ONE CAPSULE BY MOUTH 3 TIMES A DAY    There is no refill protocol information for this order            NOTE: THIS MEDICATION WAS DISCONTINUED ON 06/18/2018 DUE TO COST/ FORMULARY CHANGE.

## 2019-02-05 RX ORDER — PREGABALIN 50 MG
CAPSULE ORAL
Qty: 90 CAPSULE | Refills: 2 | Status: SHIPPED | OUTPATIENT
Start: 2019-02-05 | End: 2020-11-02

## 2019-02-05 RX ORDER — OMEPRAZOLE 40 MG/1
CAPSULE, DELAYED RELEASE ORAL
Qty: 30 CAPSULE | Refills: 1 | Status: SHIPPED | OUTPATIENT
Start: 2019-02-05 | End: 2019-05-20

## 2019-02-06 ENCOUNTER — TELEPHONE (OUTPATIENT)
Dept: FAMILY MEDICINE | Facility: CLINIC | Age: 49
End: 2019-02-06

## 2019-02-06 NOTE — TELEPHONE ENCOUNTER
Prior Authorization Retail Medication Request    Medication/Dose: LYRICA 50 MG capsule  ICD code (if different than what is on RX):    Previously Tried and Failed:    Rationale:      Insurance Name:    Insurance ID:        Pharmacy Information (if different than what is on RX)  Name:    Phone:        Third Party Information:  Name: [place] Medicaid  Cardholder ID: [id number]   Group Number:   Person Code:   Relationship: 1  TP Help Desk Phone: [phone]

## 2019-02-12 NOTE — TELEPHONE ENCOUNTER
Central Prior Authorization Team   Phone: [phone]      PA NOT NEEDED    Medication: LYRICA 50 MG capsule-PA NOT NEEDED  Insurance Company: [place] Medicaid ([facility]) - Phone [phone] Fax [phone]  [id number] [place], [place] - [address]  Filling Pharmacy Phone: [phone]  Filling Pharmacy Fax:    Start Date: 2/12/2019    PA already on file.  Called pharmacy and they were not putting in the approval code.  Please see telephone encounter from 6/7/2018.

## 2019-05-20 DIAGNOSIS — K21.9 GASTROESOPHAGEAL REFLUX DISEASE WITHOUT ESOPHAGITIS: Chronic | ICD-10-CM

## 2019-05-21 DIAGNOSIS — K08.89 TOOTH PAIN: ICD-10-CM

## 2019-05-21 NOTE — TELEPHONE ENCOUNTER
"OMEPRAZOLE DR 40 MG CAPSULE  Last Written Prescription Date:  02/05/2019  Last Fill Quantity: 30,  # refills: 1   Last office visit: 6/18/2018 with prescribing provider:  06/18/2019   Future Office Visit:    Requested Prescriptions   Pending Prescriptions Disp Refills     omeprazole (PRILOSEC) 40 MG DR capsule [Pharmacy Med Name: OMEPRAZOLE DR 40 MG CAPSULE] 30 capsule 1     Sig: TAKE 1 CAPSULE BY MOUTH EVERY DAY       PPI Protocol Failed - 5/20/2019  4:29 PM        Failed - Not on Clopidogrel (unless Pantoprazole ordered)        Passed - No diagnosis of osteoporosis on record        Passed - Recent (12 mo) or future (30 days) visit within the authorizing provider's specialty     Patient had office visit in the last 12 months or has a visit in the next 30 days with authorizing provider or within the authorizing provider's specialty.  See \"Patient Info\" tab in inbasket, or \"Choose Columns\" in Meds & Orders section of the refill encounter.              Passed - Medication is active on med list        Passed - Patient is age 18 or older          "

## 2019-05-22 ENCOUNTER — NURSE TRIAGE (OUTPATIENT)
Dept: NURSING | Facility: CLINIC | Age: 49
End: 2019-05-22

## 2019-05-22 NOTE — TELEPHONE ENCOUNTER
"MARYJANE Morris, [facility], [phone], call to request omeprazole, Tylenol and Sodium Fluoride (Denta 5000) for patient.  FNA advised will send request to clinic.  FNA advised patient/CC should be sending refill request through his pharmacy.  Caller verbalizes understanding.        Reason for Disposition    Caller requesting a NON-URGENT new prescription or refill and triager unable to refill per unit policy    Additional Information    Negative: Drug overdose and nurse unable to answer question    Negative: Caller requesting information not related to medicine    Negative: Caller requesting a prescription for Strep throat and has a positive culture result    Negative: Rash while taking a medication or within 3 days of stopping it    Negative: Immunization reaction suspected    Negative: [1] Asthma and [2] having symptoms of asthma (cough, wheezing, etc)    Negative: MORE THAN A DOUBLE DOSE of a prescription or over-the-counter (OTC) drug    Negative: [1] DOUBLE DOSE (an extra dose or lesser amount) of over-the-counter (OTC) drug AND [2] any symptoms (e.g., dizziness, nausea, pain, sleepiness)    Negative: [1] DOUBLE DOSE (an extra dose or lesser amount) of prescription drug AND [2] any symptoms (e.g., dizziness, nausea, pain, sleepiness)    Negative: Took another person's prescription drug    Negative: [1] DOUBLE DOSE (an extra dose or lesser amount) of prescription drug AND [2] NO symptoms (Exception: a double dose of antibiotics)    Negative: Diabetes drug error or overdose (e.g., insulin or extra dose)    Negative: [1] Request for URGENT new prescription or refill of \"essential\" medication (i.e., likelihood of harm to patient if not taken) AND [2] triager unable to fill per unit policy    Negative: [1] Prescription not at pharmacy AND [2] was prescribed today by PCP    Negative: Pharmacy calling with prescription questions and triager unable to answer question    Negative: Caller has URGENT medication question " about med that PCP prescribed and triager unable to answer question    Negative: Caller has NON-URGENT medication question about med that PCP prescribed and triager unable to answer question    Protocols used: MEDICATION QUESTION CALL-A-AH

## 2019-05-23 RX ORDER — OMEPRAZOLE 40 MG/1
CAPSULE, DELAYED RELEASE ORAL
Qty: 30 CAPSULE | Refills: 1 | Status: SHIPPED | OUTPATIENT
Start: 2019-05-23

## 2019-05-23 NOTE — TELEPHONE ENCOUNTER
Requested Prescriptions   Pending Prescriptions Disp Refills     Sodium Fluoride (SF 5000 PLUS) 1.1 % CREA [Pharmacy Med Name: SF 5000 PLUS CREAM]      Last Written Prescription Date:  4/3/18  Last Fill Quantity: 51 g,   # refills: 5  Last Office Visit: 6/18/18 bal  Future Office visit:       Routing refill request to provider for review/approval because:  Drug not on the [facility], [facility] or [facility] refill protocol or controlled substance   51 g 4     Sig: USE TO BRUSH TEETH DAILY       There is no refill protocol information for this order

## 2019-05-23 NOTE — TELEPHONE ENCOUNTER
Routing refill request to provider for review/approval because:  Patient also on Plavix. Provider approval needed.

## 2019-05-24 RX ORDER — SODIUM FLUORIDE 5 MG/ML
PASTE, DENTIFRICE DENTAL
Qty: 51 G | Refills: 4 | Status: SHIPPED | OUTPATIENT
Start: 2019-05-24 | End: 2019-11-07

## 2019-06-08 DIAGNOSIS — M54.16 CHRONIC RADICULAR LOW BACK PAIN: ICD-10-CM

## 2019-06-08 DIAGNOSIS — G89.29 CHRONIC RADICULAR LOW BACK PAIN: ICD-10-CM

## 2019-06-10 NOTE — TELEPHONE ENCOUNTER
"Requested Prescriptions   Pending Prescriptions Disp Refills     acetaminophen (MAPAP ARTHRITIS PAIN) 650 MG CR tablet [Pharmacy Med Name: MAPAP ARTHRITIS  MG CPLT] 270 tablet 0     Sig: TAKE 1 TABLET (650 MG) BY MOUTH 3 TIMES DAILY   Last Written Prescription Date:  6/1/2018  Last Fill Quantity: 270,  # refills: 0   Last office visit: 6/18/2018 with prescribing provider:  ASHLEY Tavares   Future Office Visit:        Analgesics (Non-Narcotic Tylenol and ASA Only) Passed - 6/8/2019  5:02 PM        Passed - Recent (12 mo) or future (30 days) visit within the authorizing provider's specialty     Patient had office visit in the last 12 months or has a visit in the next 30 days with authorizing provider or within the authorizing provider's specialty.  See \"Patient Info\" tab in inbasket, or \"Choose Columns\" in Meds & Orders section of the refill encounter.              Passed - Patient is 7 months old or older     If patient is a peds patient of the age 7 mos -12 years, ok to refill using weight-based dosing.     If >3g daily and/or sig is not \"prn\", check for liver enzymes. If normal in the last year, ok to refill.  If not, refer to the provider.          Passed - Medication is active on med list          "

## 2019-06-10 NOTE — TELEPHONE ENCOUNTER
Routing refill request to provider for review/approval because:  Pharmacy is asking for change in dose, 650 MG no longer covered by insurance, requesting 500 MG

## 2019-06-11 RX ORDER — SENNOSIDES 8.6 MG
CAPSULE ORAL
Qty: 270 TABLET | Refills: 0 | Status: SHIPPED | OUTPATIENT
Start: 2019-06-11 | End: 2019-06-21

## 2019-06-18 ENCOUNTER — TELEPHONE (OUTPATIENT)
Dept: FAMILY MEDICINE | Facility: CLINIC | Age: 49
End: 2019-06-18

## 2019-06-18 DIAGNOSIS — G89.29 CHRONIC RADICULAR LOW BACK PAIN: ICD-10-CM

## 2019-06-18 DIAGNOSIS — M54.16 CHRONIC RADICULAR LOW BACK PAIN: ICD-10-CM

## 2019-06-18 NOTE — TELEPHONE ENCOUNTER
acetaminophen (MAPAP ARTHRITIS PAIN) 650 MG CR tablet     The 650 mg is not available please send a new rx for 500 mg

## 2019-06-21 NOTE — PATIENT INSTRUCTIONS
(M54.16,  G89.29) Chronic radicular low back pain  Comment:    Plan: acetaminophen 500 MG CAPS         ONE PO FOUR TIMES DAILY PRN

## 2019-06-30 ENCOUNTER — TELEPHONE (OUTPATIENT)
Dept: FAMILY MEDICINE | Facility: CLINIC | Age: 49
End: 2019-06-30

## 2019-06-30 NOTE — TELEPHONE ENCOUNTER
[facility] now requests orders and shares plan of care/discharge summaries for some patients through [employer].  Please REPLY TO THIS MESSAGE OR ROUTE BACK TO THE AUTHOR in order to give authorization for orders when needed.  This is considered a verbal order, you will still receive a faxed copy of orders for signature.  Thank you for your assistance in improving collaboration for our patients.     ORDERS for:     Weekly RN/LPN Visits to perform assessment with focus on long term monitoring of chronic health conditions, data collection, medication reconciliation and management, and labs as ordered by MD. With 3 PRN visits for changes in patient status requiring nurse assessment, medication/treatment changes, labs as ordered by MD, supervisory visits per agency protocol, recertification assessments     Crow Rosas RN   [facility]  [phone]

## 2019-07-19 ENCOUNTER — TELEPHONE (OUTPATIENT)
Dept: FAMILY MEDICINE | Facility: CLINIC | Age: 49
End: 2019-07-19

## 2019-07-19 NOTE — TELEPHONE ENCOUNTER
Our goal is to have forms completed within 72 hours, however some forms may require a visit or additional information.    What clinic location was the form placed at [facility] or [place]     Who is the form from?   Where did the form come from? Faxed to clinic   The form was placed in the inbox of Hamlet Tavares Jr MD      Please fax to [phone]  Phone number: [phone]    Additional comments:  Home Care - [facility] 6/29/2019 - 8/27/2019    Call take on 7/19/2019 at 2:49 PM by Alhaji Haro

## 2019-07-22 DIAGNOSIS — Z53.9 DIAGNOSIS NOT YET DEFINED: Primary | ICD-10-CM

## 2019-07-25 ENCOUNTER — TELEPHONE (OUTPATIENT)
Dept: FAMILY MEDICINE | Facility: CLINIC | Age: 49
End: 2019-07-25

## 2019-07-25 NOTE — TELEPHONE ENCOUNTER
Our goal is to have forms completed within 72 hours, however some forms may require a visit or additional information.    What clinic location was the form placed at [facility] or [place].?     Who is the form from?   Where did the form come from? Faxed to clinic   The form was placed in the inbox of Hamlet Tavares Jr MD      Please fax to [phone]  Phone number: [phone]    Additional comments:  Home Care - [facility] 7/24/2019    Call take on 7/25/2019 at 3:46 PM by Alhaji Haro

## 2019-08-11 DIAGNOSIS — M54.16 CHRONIC RADICULAR LOW BACK PAIN: ICD-10-CM

## 2019-08-11 DIAGNOSIS — G89.29 CHRONIC RADICULAR LOW BACK PAIN: ICD-10-CM

## 2019-08-12 RX ORDER — SENNOSIDES 8.6 MG
CAPSULE ORAL
Qty: 270 TABLET | Refills: 0 | OUTPATIENT
Start: 2019-08-12

## 2019-08-12 NOTE — TELEPHONE ENCOUNTER
"Requested Prescriptions   Pending Prescriptions Disp Refills     acetaminophen (MAPAP ARTHRITIS PAIN) 650 MG CR tablet [Pharmacy Med Name: MAPAP ARTHRITIS  MG CPLT]  DISCONTINUED  Last Written Prescription Date:  6/11/2019 END: 6/21/2019  Last Fill Quantity: 270 tablet,  # refills: 0   Last office visit: 6/18/2018 with prescribing provider:  Graciela   Future Office Visit:     270 tablet 0     Sig: TAKE 1 TABLET (650 MG) BY MOUTH 3 TIMES DAILY       Analgesics (Non-Narcotic Tylenol and ASA Only) Failed - 8/11/2019  5:20 PM        Failed - Recent (12 mo) or future (30 days) visit within the authorizing provider's specialty     Patient had office visit in the last 12 months or has a visit in the next 30 days with authorizing provider or within the authorizing provider's specialty.  See \"Patient Info\" tab in inbasket, or \"Choose Columns\" in Meds & Orders section of the refill encounter.              Passed - Patient is 7 months old or older     If patient is a peds patient of the age 7 mos -12 years, ok to refill using weight-based dosing.     If >3g daily and/or sig is not \"prn\", check for liver enzymes. If normal in the last year, ok to refill.  If not, refer to the provider.          Passed - Medication is active on med list           "

## 2019-10-01 ENCOUNTER — TELEPHONE (OUTPATIENT)
Dept: FAMILY MEDICINE | Facility: CLINIC | Age: 49
End: 2019-10-01

## 2019-10-01 DIAGNOSIS — L29.9 PRURITIC DISORDER: ICD-10-CM

## 2019-10-01 RX ORDER — CETIRIZINE HYDROCHLORIDE 10 MG/1
10 TABLET ORAL DAILY
Qty: 30 TABLET | Refills: 1 | Status: SHIPPED | OUTPATIENT
Start: 2019-10-01

## 2019-10-01 NOTE — TELEPHONE ENCOUNTER
Obviously since patient is not seeing me anymore    We should no longer refill any of his medications or sign any more of his forms    Unless he comes back to reestablish care    Hamlet Tavares Jr., MD

## 2019-10-01 NOTE — TELEPHONE ENCOUNTER
Home care questions for PCP on current medication list. She will also be speaking with the patient to set up an OV for a med reconciliation.     Patient has two PCP at this time, one with [facility] and not outside of [facility].     Questions:  1. Two cetirizine listed on current medication list. Please delete the one that is not needed.   2. Two Tamsulosin medications on list. Please delete the one that is not needed.   3. Home care just set up medications at patient home. Losartan was one of the medications patient has in home. However on med list should be on Hyzaar instead. What should he be taking?  4. Also noted on med like that patient is taking metoprolol and Hyzaar. Is this correct should he be on both of these medications?  5. Patient is listed to be taking gabapentin and Lyrica. Should he be taking both of these medications? Also noted that he does not take his medication, Lyrica, very often. Says it does not work and will stick with the gabapentin.     Sending to PCP for review and further direction.

## 2019-10-02 NOTE — TELEPHONE ENCOUNTER
Patient Contact    Attempt # 1    Was call answered?  No.  Left message on voicemail with information to call me back if there are any further questions. Provider message was shared on VM for home care. No new orders can be given unless patient comes back to clinic to establish care.

## 2019-11-07 DIAGNOSIS — K08.89 TOOTH PAIN: ICD-10-CM

## 2019-11-07 NOTE — TELEPHONE ENCOUNTER
DENTA 5000 PLUS CREAM        Discontinued.    Last Written Prescription Date:  01/04/2018 END 04/03/2018  Last Fill Quantity: 51 g,  # refills: 5   Last office visit: 6/18/2018 with prescribing provider:  Graciela   Future Office Visit:      Routing refill request to provider for review/approval because:  Drug not on the [facility], [facility] or [facility] refill protocol or controlled substance

## 2019-11-11 RX ORDER — SODIUM FLUORIDE 5 MG/ML
PASTE, DENTIFRICE DENTAL
Qty: 51 G | Refills: 4 | Status: SHIPPED | OUTPATIENT
Start: 2019-11-11

## 2019-12-23 ENCOUNTER — DOCUMENTATION ONLY (OUTPATIENT)
Dept: CARE COORDINATION | Facility: CLINIC | Age: 49
End: 2019-12-23

## 2019-12-24 NOTE — TELEPHONE ENCOUNTER
MEDICAL RECORDS REQUEST   [facility]  Urology Clinic  [address] [place] [address]  PHONE: [phone]  Fax: [phone]        FUTURE VISIT INFORMATION                                                   Vikram Rodgers, : 1970 scheduled for future visit at [facility]    APPOINTMENT INFORMATION:    Date: 1/15/20 5:30PM    Provider:  Rosalva Sunshine RN     Reason for Visit/Diagnosis: Lower UTI     REFERRAL INFORMATION:    Referring provider:  Self    Specialty: N/A    Referring providers clinic:  N/A    Clinic contact number:  N/A    RECORDS REQUESTED FOR VISIT                                                     NOTES  STATUS/DETAILS   OFFICE NOTE from referring provider  no   OFFICE NOTE from other specialist  no   DISCHARGE SUMMARY from hospital  no   DISCHARGE REPORT from the ER  no   OPERATIVE REPORT  no   MEDICATION LIST  yes     PRE-VISIT CHECKLIST      Record collection complete Yes    Appointment appropriately scheduled           (right time/right provider) Yes   MyChart activation If no, please explain: In process    Questionnaire complete If no, please explain: In process      Completed by: Mame Parker

## 2019-12-31 PROBLEM — M99.02 SOMATIC DYSFUNCTION OF THORACOLUMBAR REGION: Status: ACTIVE | Noted: 2019-02-09

## 2019-12-31 PROBLEM — I63.9 ACUTE ISCHEMIC STROKE (H): Status: ACTIVE | Noted: 2017-02-10

## 2020-01-08 ENCOUNTER — PRE VISIT (OUTPATIENT)
Dept: UROLOGY | Facility: CLINIC | Age: 50
End: 2020-01-08

## 2020-01-08 NOTE — TELEPHONE ENCOUNTER
Reason for Visit: Consult    Diagnosis: LUTS    Orders/Procedures/Records: Records available    Contact Patient: N/A    Rooming Requirements: Normal      Lizette Vizcaino  01/08/20  12:48 PM

## 2020-01-15 ENCOUNTER — PRE VISIT (OUTPATIENT)
Dept: UROLOGY | Facility: CLINIC | Age: 50
End: 2020-01-15

## 2020-09-17 ENCOUNTER — TELEPHONE (OUTPATIENT)
Dept: NEUROLOGY | Facility: CLINIC | Age: 50
End: 2020-09-17

## 2020-09-17 NOTE — TELEPHONE ENCOUNTER
[facility] Call Center    Phone Message    May a detailed message be left on voicemail: yes     Reason for Call: Appointment Intake    Referring Provider Name: Dr. Leonardo Smart  Diagnosis and/or Symptoms: Stroke      Med Recs for this pt with [facility].  Please call Mary to help schedule this pt.     Action Taken: Message routed to:  [facility] ([facility]):  Neurology/Clinic Coordinators for Neurosciences    Travel Screening: Not Applicable

## 2020-09-21 NOTE — TELEPHONE ENCOUNTER
[facility] Call Center    Phone Message    May a detailed message be left on voicemail: yes     Reason for Call: Other: [facility] calling checking on status of getting patient scheduled for an appointment. Stated the referral was faxed over on 9/18.      Please advise and call [facility] back    Action Taken: Other:  NEUROLOGY     Travel Screening: Not Applicable

## 2020-09-22 ENCOUNTER — TRANSCRIBE ORDERS (OUTPATIENT)
Dept: OTHER | Age: 50
End: 2020-09-22

## 2020-09-22 DIAGNOSIS — Z86.73 HISTORY OF STROKE: Primary | ICD-10-CM

## 2020-09-24 NOTE — TELEPHONE ENCOUNTER
Talked with patient with an . Called patient and scheduled.  Left message for [facility].    Zuleyka Montano

## 2020-09-28 ENCOUNTER — PRE VISIT (OUTPATIENT)
Dept: NEUROLOGY | Facility: CLINIC | Age: 50
End: 2020-09-28

## 2020-09-28 NOTE — TELEPHONE ENCOUNTER
FUTURE VISIT INFORMATION      FUTURE VISIT INFORMATION:    Date: 10/5/2020    Time: 130pm    [place] [facility]  [place]ERRAL INFORMATION:    Referring provider:  Dr. Smart     Referring providers clinic:  [facility]    Reason for visit/diagnosis  Stroke    RECORDS REQUESTED FROM:       Clinic name Comments Records Status Imaging Status   [facility]-9/17/2020, 8/20/2020, 8/6/2020,  [facility] N/A          [facility] [place] Head 2/12/2017, 2/10/2017    MR Brain 2/10/2017 Care [place] Requested to PACS                       -10/22/2020-[facility] Images now in PACS-MR @ 347pm

## 2020-10-01 ENCOUNTER — APPOINTMENT (OUTPATIENT)
Dept: INTERPRETER SERVICES | Facility: CLINIC | Age: 50
End: 2020-10-01
Payer: MEDICAID

## 2020-11-02 ENCOUNTER — OFFICE VISIT (OUTPATIENT)
Dept: NEUROLOGY | Facility: CLINIC | Age: 50
End: 2020-11-02
Attending: FAMILY MEDICINE
Payer: COMMERCIAL

## 2020-11-02 VITALS
DIASTOLIC BLOOD PRESSURE: 89 MMHG | OXYGEN SATURATION: 97 % | RESPIRATION RATE: 18 BRPM | HEART RATE: 96 BPM | SYSTOLIC BLOOD PRESSURE: 152 MMHG

## 2020-11-02 DIAGNOSIS — M79.2 NEUROGENIC PAIN DUE TO CENTRAL NERVOUS SYSTEM ABNORMALITY FOLLOWING STROKE: ICD-10-CM

## 2020-11-02 DIAGNOSIS — Z86.73 HISTORY OF STROKE: Primary | ICD-10-CM

## 2020-11-02 DIAGNOSIS — I63.431 CEREBROVASCULAR ACCIDENT (CVA) DUE TO EMBOLISM OF RIGHT POSTERIOR CEREBRAL ARTERY (H): ICD-10-CM

## 2020-11-02 DIAGNOSIS — I69.398 NEUROGENIC PAIN DUE TO CENTRAL NERVOUS SYSTEM ABNORMALITY FOLLOWING STROKE: ICD-10-CM

## 2020-11-02 PROCEDURE — 99215 OFFICE O/P EST HI 40 MIN: CPT | Performed by: PSYCHIATRY & NEUROLOGY

## 2020-11-02 NOTE — LETTER
2020       RE: Vikram Rodgers  [address]  [facility] [address]     Dear Colleague,    Thank you for referring your patient, Vikram Rodgers, to the [facility] [place] at [facility]. Please see a copy of my visit note below.    Service Date: 2020      Leonardo Smart DO    [facility]   [address] [id number]       RE: Vikram Rodgers   MRN: [id number]   : 1970      Dear Dr. Smart:      I saw Vikram Rodgers in Neurology on 2020.  A Elli  was available.  He is a 50-year-old male here for followup of a stroke that occurred in 2017.      He was hospitalized at [facility] in 2017 with left-sided weakness and numbness.  He was found to have a right posterior cerebral artery occlusion.  There was involvement on the MRI scan of a right internal capsule, thalamus, corpus callosum and right medial temporal lobe.      I saw him in followup on 2018.  He was complaining of ongoing left-sided numbness, heaviness and pain.  I should note, there was some concern that he may have had an embolic stroke, although he has multiple other stroke risk factors including hypertension, hyperlipidemia and diabetes.  I did order a Zio Patch monitor.  This was placed, but there was only 1 day and 9 hours of usable data after artifact was removed.  There was no atrial fibrillation.  He failed to follow up with me after this.      It does not sound like he has developed any new symptoms.  He still reports a sense of heaviness on his left side, particularly in the colder weather.  This affects his arm and leg.  He reports no new stroke symptoms.      He continues to have pain on the left side.  He was being treated with gabapentin when I saw him.  He is still on gabapentin, but I am not certain what dose he is taking.  He tells me he is  taking 2 pills a day, either in the morning or at night.  Our records suggest that he was utilizing 300 mg gabapentin, but the most recent note I have from your clinic suggests that he is utilizing 100 mg gabapentin.  He also was on Lyrica for a while, but this was discontinued and he confirms that.  He is also on atorvastatin.  He had a very high LDL cholesterol recently at 215, and I believe his atorvastatin dose was increased to 80 mg, but again, I am not certain about that.  He just knows he is on medicines for high blood pressure and diabetes and high cholesterol.  He does indicate he is taking 1 aspirin a day.  I do not think he is any longer on Plavix.      CURRENT MEDICATIONS:  Other medications, I believe he is on include amlodipine, Invokana, Zyrtec, vitamin D, insulin, Cozaar, Toprol, omeprazole, tamsulosin, Flomax and aspirin 81 mg.      PHYSICAL EXAMINATION:     Examination reveals he is alert and cooperative.  Heart rate 96, blood pressure 152/89.      He does not have a visual field deficit.  He has full extraocular motility.  He has a symmetric smile.  There is some mild to moderate left hemiparesis affecting the arm a bit more than the leg.  He does have a pronator drift and favors the left leg when he walks.  He has no loss of touch sensation over the arm or leg.  He has no loss of position sense.  Finger-to-nose is a bit irregular on the left.  He does ambulate with a rolling walker.  Reflexes are 1 to 2+ in the upper extremities, absent at the knees, 0 at the right ankle and 2+ at the left ankle.  Right plantar response is flexor, the left is extensor.      IMPRESSION:   1.  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   2.  History of hypertension, hyperlipidemia and diabetes.         PLAN:  I explained that we need to get further cardiac monitoring because of the concerns of embolic stroke.      I am not certain what medications he is on and at  "what doses.  It may be possible to increase his gabapentin dose further to help with the pain, but I need to see precisely what he is taking.      The plan is for him to come back in a couple of weeks with an updated medication list.  Also, on that same day, he will have a Zio Patch monitor placed.      He did ask me about \"nerve damage,\" and I did explain to him that he did have a stroke in the brain that would account for the left-sided symptoms.      He asked me about getting a shot for his stroke with the hopes that this would improve his chronic deficit, and I told him this was not available.      Total visit time with the patient was 40 minutes.  More than 50% of this was spent in counseling.      Sincerely,              MARY KAY HASSAN MD             D: 2020   T: 2020   MT: MELISSA      Name:     GIGI MAX   MRN:      [id number]        Account:      [id number]   :      1970           Service Date: 2020      Document: [id number]        "

## 2020-11-03 RX ORDER — AMLODIPINE BESYLATE 10 MG/1
10 TABLET ORAL DAILY
COMMUNITY

## 2020-11-03 NOTE — PROGRESS NOTES
Service Date: 2020      Leonardo Smart DO    [facility]    [address]  [address]       RE: Vikram Rodgers   MRN: [id number]   : 1970      Dear Dr. Smart:      I saw Vikram Rodgers in Neurology on 2020.  A [employer]  was available.  He is a 50-year-old male here for followup of a stroke that occurred in 2017.      He was hospitalized at [facility] in 2017 with left-sided weakness and numbness.  He was found to have a right posterior cerebral artery occlusion.  There was involvement on the MRI scan of a right internal capsule, thalamus, corpus callosum and right medial temporal lobe.      I saw him in followup on 2018.  He was complaining of ongoing left-sided numbness, heaviness and pain.  I should note, there was some concern that he may have had an embolic stroke, although he has multiple other stroke risk factors including hypertension, hyperlipidemia and diabetes.  I did order a Zio Patch monitor.  This was placed, but there was only 1 day and 9 hours of usable data after artifact was removed.  There was no atrial fibrillation.  He failed to follow up with me after this.      It does not sound like he has developed any new symptoms.  He still reports a sense of heaviness on his left side, particularly in the colder weather.  This affects his arm and leg.  He reports no new stroke symptoms.      He continues to have pain on the left side.  He was being treated with gabapentin when I saw him.  He is still on gabapentin, but I am not certain what dose he is taking.  He tells me he is taking 2 pills a day, either in the morning or at night.  Our records suggest that he was utilizing 300 mg gabapentin, but the most recent note I have from your clinic suggests that he is utilizing 100 mg gabapentin.  He also was on Lyrica for a while, but this was discontinued and he confirms that.  He is also on  atorvastatin.  He had a very high LDL cholesterol recently at 215, and I believe his atorvastatin dose was increased to 80 mg, but again, I am not certain about that.  He just knows he is on medicines for high blood pressure and diabetes and high cholesterol.  He does indicate he is taking 1 aspirin a day.  I do not think he is any longer on Plavix.      CURRENT MEDICATIONS:  Other medications, I believe he is on include amlodipine, Invokana, Zyrtec, vitamin D, insulin, Cozaar, Toprol, omeprazole, tamsulosin, Flomax and aspirin 81 mg.      PHYSICAL EXAMINATION:     Examination reveals he is alert and cooperative.  Heart rate 96, blood pressure 152/89.      He does not have a visual field deficit.  He has full extraocular motility.  He has a symmetric smile.  There is some mild to moderate left hemiparesis affecting the arm a bit more than the leg.  He does have a pronator drift and favors the left leg when he walks.  He has no loss of touch sensation over the arm or leg.  He has no loss of position sense.  Finger-to-nose is a bit irregular on the left.  He does ambulate with a rolling walker.  Reflexes are 1 to 2+ in the upper extremities, absent at the knees, 0 at the right ankle and 2+ at the left ankle.  Right plantar response is flexor, the left is extensor.      IMPRESSION:   1.  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   2.  History of hypertension, hyperlipidemia and diabetes.         PLAN:  I explained that we need to get further cardiac monitoring because of the concerns of embolic stroke.      I am not certain what medications he is on and at what doses.  It may be possible to increase his gabapentin dose further to help with the pain, but I need to see precisely what he is taking.      The plan is for him to come back in a couple of weeks with an updated medication list.  Also, on that same day, he will have a Zio Patch monitor placed.      He did ask me  "about \"nerve damage,\" and I did explain to him that he did have a stroke in the brain that would account for the left-sided symptoms.      He asked me about getting a shot for his stroke with the hopes that this would improve his chronic deficit, and I told him this was not available.      Total visit time with the patient was 40 minutes.  More than 50% of this was spent in counseling.      Sincerely,            MD MARY KAY Kan MD             D: 2020   T: 2020   MT: MELISSA      Name:     GIGI MAX   MRN:      [id number]        Account:      [id number]   :      1970           Service Date: 2020      Document: [id number]      "

## 2020-11-20 ENCOUNTER — OFFICE VISIT (OUTPATIENT)
Dept: NEUROLOGY | Facility: CLINIC | Age: 50
End: 2020-11-20
Payer: COMMERCIAL

## 2020-11-20 VITALS
OXYGEN SATURATION: 98 % | RESPIRATION RATE: 16 BRPM | BODY MASS INDEX: 24.02 KG/M2 | SYSTOLIC BLOOD PRESSURE: 147 MMHG | WEIGHT: 165 LBS | DIASTOLIC BLOOD PRESSURE: 87 MMHG | HEART RATE: 84 BPM

## 2020-11-20 DIAGNOSIS — Z86.73 HISTORY OF STROKE: Primary | ICD-10-CM

## 2020-11-20 PROCEDURE — 99213 OFFICE O/P EST LOW 20 MIN: CPT | Performed by: PSYCHIATRY & NEUROLOGY

## 2020-11-20 RX ORDER — ATORVASTATIN CALCIUM 80 MG/1
TABLET, FILM COATED ORAL
COMMUNITY
Start: 2020-11-16

## 2020-11-20 RX ORDER — GABAPENTIN 100 MG/1
CAPSULE ORAL
COMMUNITY
Start: 2020-11-16

## 2020-11-20 RX ORDER — INSULIN GLARGINE 100 [IU]/ML
INJECTION, SOLUTION SUBCUTANEOUS
COMMUNITY
Start: 2020-11-16

## 2020-11-20 RX ORDER — CHOLECALCIFEROL (VITAMIN D3) 50 MCG
TABLET ORAL
COMMUNITY
Start: 2020-11-16

## 2020-11-20 RX ORDER — LOSARTAN POTASSIUM 100 MG/1
TABLET ORAL
COMMUNITY
Start: 2020-11-17

## 2020-11-20 NOTE — PROGRESS NOTES
Service Date: 11/20/2020      REFERRING PHYSICIAN:  Leonardo Smart DO, [facility]      HISTORY OF PRESENT ILLNESS:  Vikram Rodgers returns for scheduled follow-up.  He is a patient who had a right posterior cerebral artery stroke due to arterial occlusion in 02/2017.  He has residual left-sided weakness and numbness and also post-stroke pain.  I saw him for follow-up on 11/02/20.  At that time, it was not entirely clear what medications he was taking; there was some discrepancy between our records and available records.  The plan for him for today was for him to follow up with his medications so I could review them and also for him to have a Zio Patch monitor placed.  He missed the appointment for the Zio Patch monitor and he failed to bring his medications in.      I discussed this with him through the aid of a Spanish .  I definitely need to see exactly what medications he is taking and at what doses.  In particular, I will be interested in his dose of gabapentin as this is being employed to treat his pain.  I am also not certain what dose of atorvastatin he is on.  He did have a high LDL cholesterol recently of 215.  I do believe he is taking aspirin.      We were able to reach the technician who is going to place the Zio Patch monitor today.      I explained to him what the purpose of the Zio Patch monitor was and went over how he will have to mail it in and the technician will also discuss this with him.      I am going to see him back in 6 weeks and hopefully I will have the results of the Zio Patch monitor at that time.  I also asked him again to bring in all of his medications, so I can be certain what he is taking and at what dose.  He acknowledged this.      Total visit time was 15 minutes.  More than 50% of this was spent in counseling and care coordination.      Sunny Jackson MD      cc:   [facility]   2020 [address] [address]          MARY KAY HASSAN MD             D: 2020   T: 2020   MT: al      Name:     GIGI MAX   MRN:      [id number]        Account:      [id number]   :      1970           Service Date: 2020      Document: [id number]

## 2020-11-20 NOTE — LETTER
11/20/2020       RE: Vikram Rodgers  [address]  [facility] [address]     Dear Colleague,    Thank you for referring your patient, Vikram Rodgers, to the [facility] [place] [facility]. Please see a copy of my visit note below.    Service Date: 11/20/2020      REFERRING PHYSICIAN:  Leonardo Smart DO, [facility]      HISTORY OF PRESENT ILLNESS:  Vikram Rodgers returns for scheduled follow-up.  He is a patient who had a right posterior cerebral artery stroke due to arterial occlusion in 02/2017.  He has residual left-sided weakness and numbness and also post-stroke pain.  I saw him for follow-up on 11/02/20.  At that time, it was not entirely clear what medications he was taking; there was some discrepancy between our records and available records.  The plan for him for today was for him to follow up with his medications so I could review them and also for him to have a Zio Patch monitor placed.  He missed the appointment for the Zio Patch monitor and he failed to bring his medications in.      I discussed this with him through the aid of a [facility] .  I definitely need to see exactly what medications he is taking and at what doses.  In particular, I will be interested in his dose of gabapentin as this is being employed to treat his pain.  I am also not certain what dose of atorvastatin he is on.  He did have a high LDL cholesterol recently of 215.  I do believe he is taking aspirin.      We were able to reach the technician who is going to place the Zio Patch monitor today.      I explained to him what the purpose of the Zio Patch monitor was and went over how he will have to mail it in and the technician will also discuss this with him.      I am going to see him back in 6 weeks and hopefully I will have the results of the Zio Patch monitor at that time.  I also asked him again to bring in all of his  medications, so I can be certain what he is taking and at what dose.  He acknowledged this.      Total visit time was 15 minutes.  More than 50% of this was spent in counseling and care coordination.      Sunny Jackson MD      cc:   Leonardo Smart DO   [facility]    [address] [address]             D: 2020   T: 2020   MT: al      Name:     GIGI MAX   MRN:      [id number]        Account:      [id number]   :      1970           Service Date: 2020      Document: [id number]

## 2022-02-17 PROBLEM — F01.50 VASCULAR DEMENTIA WITHOUT BEHAVIORAL DISTURBANCE (H): Status: ACTIVE | Noted: 2017-02-27

## 2025-01-29 ENCOUNTER — PRE VISIT (OUTPATIENT)
Dept: UROLOGY | Facility: CLINIC | Age: 55
End: 2025-01-29

## 2025-01-29 NOTE — TELEPHONE ENCOUNTER
Reason for visit: BPH with LUTS     Relevant information: **[facility] **, LUTS    Records/imaging/labs/orders: All records available    At Rooming:  Standard rooming      Zain Ocasio  1/29/2025  4:33 PM